# Patient Record
Sex: FEMALE | Race: WHITE | NOT HISPANIC OR LATINO | Employment: OTHER | URBAN - METROPOLITAN AREA
[De-identification: names, ages, dates, MRNs, and addresses within clinical notes are randomized per-mention and may not be internally consistent; named-entity substitution may affect disease eponyms.]

---

## 2018-01-26 ENCOUNTER — HOSPITAL ENCOUNTER (INPATIENT)
Facility: HOSPITAL | Age: 33
LOS: 5 days | Discharge: HOME | DRG: 638 | End: 2018-01-31
Attending: EMERGENCY MEDICINE | Admitting: FAMILY MEDICINE
Payer: MEDICARE

## 2018-01-26 DIAGNOSIS — B19.20 HEPATITIS C VIRUS INFECTION WITHOUT HEPATIC COMA, UNSPECIFIED CHRONICITY: ICD-10-CM

## 2018-01-26 DIAGNOSIS — E10.10 DIABETIC KETOACIDOSIS WITHOUT COMA ASSOCIATED WITH TYPE 1 DIABETES MELLITUS (HCC): Primary | ICD-10-CM

## 2018-01-26 DIAGNOSIS — J10.1 INFLUENZA A: ICD-10-CM

## 2018-01-26 PROBLEM — R73.9 HYPERGLYCEMIA: Status: ACTIVE | Noted: 2018-01-26

## 2018-01-26 PROBLEM — F11.10 HEROIN ABUSE (HCC): Status: ACTIVE | Noted: 2018-01-26

## 2018-01-26 LAB
ACETONE SERPL-MCNC: ABNORMAL MG/DL
ALBUMIN SERPL BCP-MCNC: 3.9 G/DL (ref 3.5–5)
ALP SERPL-CCNC: 124 U/L (ref 46–116)
ALT SERPL W P-5'-P-CCNC: 15 U/L (ref 12–78)
ANION GAP SERPL CALCULATED.3IONS-SCNC: 19 MMOL/L (ref 4–13)
ANION GAP SERPL CALCULATED.3IONS-SCNC: 9 MMOL/L (ref 4–13)
AST SERPL W P-5'-P-CCNC: 15 U/L (ref 5–45)
BASE EX.OXY STD BLDV CALC-SCNC: 90.8 % (ref 60–80)
BASE EXCESS BLDA CALC-SCNC: -4.6 MMOL/L
BASE EXCESS BLDV CALC-SCNC: -11.4 MMOL/L
BASOPHILS # BLD AUTO: 0.03 THOUSANDS/ΜL (ref 0–0.1)
BASOPHILS NFR BLD AUTO: 0 % (ref 0–1)
BILIRUB SERPL-MCNC: 0.71 MG/DL (ref 0.2–1)
BILIRUB UR QL STRIP: ABNORMAL
BUN SERPL-MCNC: 18 MG/DL (ref 5–25)
BUN SERPL-MCNC: 21 MG/DL (ref 5–25)
CALCIUM SERPL-MCNC: 8 MG/DL (ref 8.3–10.1)
CALCIUM SERPL-MCNC: 9.4 MG/DL (ref 8.3–10.1)
CHLORIDE SERPL-SCNC: 105 MMOL/L (ref 100–108)
CHLORIDE SERPL-SCNC: 94 MMOL/L (ref 100–108)
CLARITY UR: CLEAR
CO2 SERPL-SCNC: 20 MMOL/L (ref 21–32)
CO2 SERPL-SCNC: 21 MMOL/L (ref 21–32)
COLOR UR: YELLOW
CREAT SERPL-MCNC: 0.82 MG/DL (ref 0.6–1.3)
CREAT SERPL-MCNC: 0.93 MG/DL (ref 0.6–1.3)
EOSINOPHIL # BLD AUTO: 0.16 THOUSAND/ΜL (ref 0–0.61)
EOSINOPHIL NFR BLD AUTO: 1 % (ref 0–6)
ERYTHROCYTE [DISTWIDTH] IN BLOOD BY AUTOMATED COUNT: 15.5 % (ref 11.6–15.1)
EXT PREG TEST URINE: NORMAL
GFR SERPL CREATININE-BSD FRML MDRD: 80 ML/MIN/1.73SQ M
GFR SERPL CREATININE-BSD FRML MDRD: 94 ML/MIN/1.73SQ M
GLUCOSE SERPL-MCNC: 100 MG/DL (ref 65–140)
GLUCOSE SERPL-MCNC: 191 MG/DL (ref 65–140)
GLUCOSE SERPL-MCNC: 194 MG/DL (ref 65–140)
GLUCOSE SERPL-MCNC: 217 MG/DL (ref 65–140)
GLUCOSE SERPL-MCNC: 239 MG/DL (ref 65–140)
GLUCOSE SERPL-MCNC: 292 MG/DL (ref 65–140)
GLUCOSE SERPL-MCNC: 309 MG/DL (ref 65–140)
GLUCOSE SERPL-MCNC: 327 MG/DL (ref 65–140)
GLUCOSE SERPL-MCNC: 351 MG/DL (ref 65–140)
GLUCOSE SERPL-MCNC: 354 MG/DL (ref 65–140)
GLUCOSE SERPL-MCNC: 440 MG/DL (ref 65–140)
GLUCOSE SERPL-MCNC: 444 MG/DL (ref 65–140)
GLUCOSE UR STRIP-MCNC: ABNORMAL MG/DL
HCO3 BLDA-SCNC: 19.7 MMOL/L (ref 22–28)
HCO3 BLDV-SCNC: 14.3 MMOL/L (ref 24–30)
HCT VFR BLD AUTO: 37.9 % (ref 34.8–46.1)
HGB BLD-MCNC: 11.9 G/DL (ref 11.5–15.4)
HGB UR QL STRIP.AUTO: NEGATIVE
KETONES UR STRIP-MCNC: ABNORMAL MG/DL
LEUKOCYTE ESTERASE UR QL STRIP: NEGATIVE
LIPASE SERPL-CCNC: 101 U/L (ref 73–393)
LYMPHOCYTES # BLD AUTO: 2.4 THOUSANDS/ΜL (ref 0.6–4.47)
LYMPHOCYTES NFR BLD AUTO: 16 % (ref 14–44)
MCH RBC QN AUTO: 27.7 PG (ref 26.8–34.3)
MCHC RBC AUTO-ENTMCNC: 31.4 G/DL (ref 31.4–37.4)
MCV RBC AUTO: 88 FL (ref 82–98)
MONOCYTES # BLD AUTO: 0.6 THOUSAND/ΜL (ref 0.17–1.22)
MONOCYTES NFR BLD AUTO: 4 % (ref 4–12)
NEUTROPHILS # BLD AUTO: 11.49 THOUSANDS/ΜL (ref 1.85–7.62)
NEUTS SEG NFR BLD AUTO: 79 % (ref 43–75)
NITRITE UR QL STRIP: NEGATIVE
O2 CT BLDA-SCNC: 13.8 ML/DL (ref 16–23)
O2 CT BLDV-SCNC: 14.6 ML/DL
OXYHGB MFR BLDA: 96.5 % (ref 94–97)
PCO2 BLDA: 33.2 MM HG (ref 36–44)
PCO2 BLDV: 31.6 MM HG (ref 42–50)
PH BLDA: 7.39 [PH] (ref 7.35–7.45)
PH BLDV: 7.27 [PH] (ref 7.3–7.4)
PH UR STRIP.AUTO: 5 [PH] (ref 4.5–8)
PLATELET # BLD AUTO: 394 THOUSANDS/UL (ref 149–390)
PMV BLD AUTO: 10 FL (ref 8.9–12.7)
PO2 BLDA: 95.8 MM HG (ref 75–129)
PO2 BLDV: 69.9 MM HG (ref 35–45)
POTASSIUM SERPL-SCNC: 4.1 MMOL/L (ref 3.5–5.3)
POTASSIUM SERPL-SCNC: 4.4 MMOL/L (ref 3.5–5.3)
PROT SERPL-MCNC: 8.4 G/DL (ref 6.4–8.2)
PROT UR STRIP-MCNC: NEGATIVE MG/DL
RBC # BLD AUTO: 4.29 MILLION/UL (ref 3.81–5.12)
SODIUM SERPL-SCNC: 133 MMOL/L (ref 136–145)
SODIUM SERPL-SCNC: 135 MMOL/L (ref 136–145)
SP GR UR STRIP.AUTO: 1.02 (ref 1–1.03)
UROBILINOGEN UR QL STRIP.AUTO: 0.2 E.U./DL
WBC # BLD AUTO: 14.68 THOUSAND/UL (ref 4.31–10.16)

## 2018-01-26 PROCEDURE — 81003 URINALYSIS AUTO W/O SCOPE: CPT

## 2018-01-26 PROCEDURE — 96374 THER/PROPH/DIAG INJ IV PUSH: CPT

## 2018-01-26 PROCEDURE — 99285 EMERGENCY DEPT VISIT HI MDM: CPT

## 2018-01-26 PROCEDURE — 96361 HYDRATE IV INFUSION ADD-ON: CPT

## 2018-01-26 PROCEDURE — 82805 BLOOD GASES W/O2 SATURATION: CPT | Performed by: EMERGENCY MEDICINE

## 2018-01-26 PROCEDURE — 36415 COLL VENOUS BLD VENIPUNCTURE: CPT | Performed by: EMERGENCY MEDICINE

## 2018-01-26 PROCEDURE — 80048 BASIC METABOLIC PNL TOTAL CA: CPT | Performed by: PHYSICIAN ASSISTANT

## 2018-01-26 PROCEDURE — 99223 1ST HOSP IP/OBS HIGH 75: CPT | Performed by: PHYSICIAN ASSISTANT

## 2018-01-26 PROCEDURE — 81025 URINE PREGNANCY TEST: CPT | Performed by: EMERGENCY MEDICINE

## 2018-01-26 PROCEDURE — 82948 REAGENT STRIP/BLOOD GLUCOSE: CPT

## 2018-01-26 PROCEDURE — 83690 ASSAY OF LIPASE: CPT | Performed by: EMERGENCY MEDICINE

## 2018-01-26 PROCEDURE — 82805 BLOOD GASES W/O2 SATURATION: CPT | Performed by: PHYSICIAN ASSISTANT

## 2018-01-26 PROCEDURE — 85025 COMPLETE CBC W/AUTO DIFF WBC: CPT | Performed by: EMERGENCY MEDICINE

## 2018-01-26 PROCEDURE — 36600 WITHDRAWAL OF ARTERIAL BLOOD: CPT

## 2018-01-26 PROCEDURE — 81002 URINALYSIS NONAUTO W/O SCOPE: CPT | Performed by: EMERGENCY MEDICINE

## 2018-01-26 PROCEDURE — 80053 COMPREHEN METABOLIC PANEL: CPT | Performed by: EMERGENCY MEDICINE

## 2018-01-26 PROCEDURE — 82009 KETONE BODYS QUAL: CPT | Performed by: EMERGENCY MEDICINE

## 2018-01-26 RX ORDER — VENLAFAXINE 75 MG/1
37.5 TABLET ORAL 2 TIMES DAILY
Status: DISCONTINUED | OUTPATIENT
Start: 2018-01-26 | End: 2018-01-31 | Stop reason: HOSPADM

## 2018-01-26 RX ORDER — ONDANSETRON 2 MG/ML
4 INJECTION INTRAMUSCULAR; INTRAVENOUS EVERY 6 HOURS PRN
Status: DISCONTINUED | OUTPATIENT
Start: 2018-01-26 | End: 2018-01-31 | Stop reason: HOSPADM

## 2018-01-26 RX ORDER — GABAPENTIN 100 MG/1
100 CAPSULE ORAL 3 TIMES DAILY
COMMUNITY
End: 2018-02-27

## 2018-01-26 RX ORDER — INSULIN GLARGINE 100 [IU]/ML
15 INJECTION, SOLUTION SUBCUTANEOUS ONCE
Status: COMPLETED | OUTPATIENT
Start: 2018-01-26 | End: 2018-01-26

## 2018-01-26 RX ORDER — GABAPENTIN 100 MG/1
100 CAPSULE ORAL 3 TIMES DAILY
Status: DISCONTINUED | OUTPATIENT
Start: 2018-01-26 | End: 2018-01-31 | Stop reason: HOSPADM

## 2018-01-26 RX ORDER — AMLODIPINE BESYLATE 10 MG/1
10 TABLET ORAL DAILY
Status: DISCONTINUED | OUTPATIENT
Start: 2018-01-26 | End: 2018-01-31 | Stop reason: HOSPADM

## 2018-01-26 RX ORDER — SODIUM CHLORIDE 9 MG/ML
250 INJECTION, SOLUTION INTRAVENOUS CONTINUOUS
Status: DISCONTINUED | OUTPATIENT
Start: 2018-01-26 | End: 2018-01-26

## 2018-01-26 RX ORDER — SODIUM CHLORIDE 9 MG/ML
500 INJECTION, SOLUTION INTRAVENOUS CONTINUOUS
Status: DISCONTINUED | OUTPATIENT
Start: 2018-01-26 | End: 2018-01-26

## 2018-01-26 RX ORDER — SODIUM CHLORIDE 9 MG/ML
75 INJECTION, SOLUTION INTRAVENOUS CONTINUOUS
Status: DISCONTINUED | OUTPATIENT
Start: 2018-01-26 | End: 2018-01-30

## 2018-01-26 RX ORDER — AMLODIPINE BESYLATE 10 MG/1
10 TABLET ORAL DAILY
COMMUNITY
End: 2018-02-27

## 2018-01-26 RX ORDER — VENLAFAXINE 37.5 MG/1
37.5 TABLET ORAL 2 TIMES DAILY
COMMUNITY
End: 2018-02-27

## 2018-01-26 RX ORDER — DEXTROSE AND SODIUM CHLORIDE 5; .9 G/100ML; G/100ML
250 INJECTION, SOLUTION INTRAVENOUS CONTINUOUS
Status: DISCONTINUED | OUTPATIENT
Start: 2018-01-26 | End: 2018-01-26

## 2018-01-26 RX ORDER — PANTOPRAZOLE SODIUM 40 MG/1
40 TABLET, DELAYED RELEASE ORAL DAILY
COMMUNITY
End: 2018-02-27

## 2018-01-26 RX ORDER — PANTOPRAZOLE SODIUM 40 MG/1
40 TABLET, DELAYED RELEASE ORAL
Status: DISCONTINUED | OUTPATIENT
Start: 2018-01-26 | End: 2018-01-31 | Stop reason: HOSPADM

## 2018-01-26 RX ORDER — ACETAMINOPHEN 325 MG/1
650 TABLET ORAL EVERY 6 HOURS PRN
Status: DISCONTINUED | OUTPATIENT
Start: 2018-01-26 | End: 2018-01-31 | Stop reason: HOSPADM

## 2018-01-26 RX ORDER — SODIUM CHLORIDE 9 MG/ML
2000 INJECTION, SOLUTION INTRAVENOUS CONTINUOUS
Status: DISCONTINUED | OUTPATIENT
Start: 2018-01-26 | End: 2018-01-26

## 2018-01-26 RX ORDER — ONDANSETRON 2 MG/ML
4 INJECTION INTRAMUSCULAR; INTRAVENOUS ONCE
Status: COMPLETED | OUTPATIENT
Start: 2018-01-26 | End: 2018-01-26

## 2018-01-26 RX ADMIN — SODIUM CHLORIDE 500 ML/HR: 0.9 INJECTION, SOLUTION INTRAVENOUS at 12:58

## 2018-01-26 RX ADMIN — INSULIN GLARGINE 15 UNITS: 100 INJECTION, SOLUTION SUBCUTANEOUS at 18:10

## 2018-01-26 RX ADMIN — ACETAMINOPHEN 650 MG: 325 TABLET, FILM COATED ORAL at 21:19

## 2018-01-26 RX ADMIN — AMLODIPINE BESYLATE 10 MG: 10 TABLET ORAL at 14:36

## 2018-01-26 RX ADMIN — Medication 8 UNITS/HR: at 12:34

## 2018-01-26 RX ADMIN — SODIUM CHLORIDE 2000 ML/HR: 0.9 INJECTION, SOLUTION INTRAVENOUS at 10:26

## 2018-01-26 RX ADMIN — GABAPENTIN 100 MG: 100 CAPSULE ORAL at 21:20

## 2018-01-26 RX ADMIN — ONDANSETRON 4 MG: 2 INJECTION INTRAMUSCULAR; INTRAVENOUS at 08:31

## 2018-01-26 RX ADMIN — VENLAFAXINE 37.5 MG: 75 TABLET ORAL at 17:25

## 2018-01-26 RX ADMIN — ACETAMINOPHEN 650 MG: 325 TABLET, FILM COATED ORAL at 14:37

## 2018-01-26 RX ADMIN — SODIUM CHLORIDE 1000 ML: 0.9 INJECTION, SOLUTION INTRAVENOUS at 08:26

## 2018-01-26 RX ADMIN — ENOXAPARIN SODIUM 40 MG: 40 INJECTION SUBCUTANEOUS at 14:39

## 2018-01-26 RX ADMIN — SODIUM CHLORIDE 125 ML/HR: 0.9 INJECTION, SOLUTION INTRAVENOUS at 15:10

## 2018-01-26 RX ADMIN — PANTOPRAZOLE SODIUM 40 MG: 40 TABLET, DELAYED RELEASE ORAL at 14:37

## 2018-01-26 RX ADMIN — SODIUM CHLORIDE 7 UNITS/HR: 9 INJECTION, SOLUTION INTRAVENOUS at 10:46

## 2018-01-26 RX ADMIN — GABAPENTIN 100 MG: 100 CAPSULE ORAL at 15:12
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
## 2018-01-26 NOTE — ED NOTES
Patient moved from stretcher to impatient bed for comfort due to patient being an ED HOLD       Husam Wasserman RN  01/26/18 2038
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
## 2018-01-27 ENCOUNTER — APPOINTMENT (INPATIENT)
Dept: RADIOLOGY | Facility: HOSPITAL | Age: 33
DRG: 638 | End: 2018-01-27
Payer: MEDICARE

## 2018-01-27 PROBLEM — R65.10 SIRS (SYSTEMIC INFLAMMATORY RESPONSE SYNDROME) (HCC): Status: ACTIVE | Noted: 2018-01-27

## 2018-01-27 LAB
ALBUMIN SERPL BCP-MCNC: 3 G/DL (ref 3.5–5)
ALP SERPL-CCNC: 104 U/L (ref 46–116)
ALT SERPL W P-5'-P-CCNC: 14 U/L (ref 12–78)
ANION GAP SERPL CALCULATED.3IONS-SCNC: 12 MMOL/L (ref 4–13)
AST SERPL W P-5'-P-CCNC: 10 U/L (ref 5–45)
BILIRUB SERPL-MCNC: 0.41 MG/DL (ref 0.2–1)
BUN SERPL-MCNC: 10 MG/DL (ref 5–25)
CALCIUM SERPL-MCNC: 7.8 MG/DL (ref 8.3–10.1)
CHLORIDE SERPL-SCNC: 99 MMOL/L (ref 100–108)
CO2 SERPL-SCNC: 23 MMOL/L (ref 21–32)
CREAT SERPL-MCNC: 0.69 MG/DL (ref 0.6–1.3)
ERYTHROCYTE [DISTWIDTH] IN BLOOD BY AUTOMATED COUNT: 15.8 % (ref 11.6–15.1)
GFR SERPL CREATININE-BSD FRML MDRD: 114 ML/MIN/1.73SQ M
GLUCOSE SERPL-MCNC: 244 MG/DL (ref 65–140)
GLUCOSE SERPL-MCNC: 253 MG/DL (ref 65–140)
GLUCOSE SERPL-MCNC: 272 MG/DL (ref 65–140)
GLUCOSE SERPL-MCNC: 319 MG/DL (ref 65–140)
GLUCOSE SERPL-MCNC: 388 MG/DL (ref 65–140)
GLUCOSE SERPL-MCNC: 405 MG/DL (ref 65–140)
HCT VFR BLD AUTO: 33.4 % (ref 34.8–46.1)
HGB BLD-MCNC: 10.5 G/DL (ref 11.5–15.4)
MAGNESIUM SERPL-MCNC: 1.8 MG/DL (ref 1.6–2.6)
MCH RBC QN AUTO: 27.6 PG (ref 26.8–34.3)
MCHC RBC AUTO-ENTMCNC: 31.4 G/DL (ref 31.4–37.4)
MCV RBC AUTO: 88 FL (ref 82–98)
PHOSPHATE SERPL-MCNC: 2.3 MG/DL (ref 2.7–4.5)
PLATELET # BLD AUTO: 315 THOUSANDS/UL (ref 149–390)
PMV BLD AUTO: 10.1 FL (ref 8.9–12.7)
POTASSIUM SERPL-SCNC: 3.9 MMOL/L (ref 3.5–5.3)
PROT SERPL-MCNC: 6.8 G/DL (ref 6.4–8.2)
RBC # BLD AUTO: 3.81 MILLION/UL (ref 3.81–5.12)
SODIUM SERPL-SCNC: 134 MMOL/L (ref 136–145)
WBC # BLD AUTO: 11.08 THOUSAND/UL (ref 4.31–10.16)

## 2018-01-27 PROCEDURE — 82948 REAGENT STRIP/BLOOD GLUCOSE: CPT

## 2018-01-27 PROCEDURE — 87040 BLOOD CULTURE FOR BACTERIA: CPT | Performed by: FAMILY MEDICINE

## 2018-01-27 PROCEDURE — 71045 X-RAY EXAM CHEST 1 VIEW: CPT

## 2018-01-27 PROCEDURE — 87798 DETECT AGENT NOS DNA AMP: CPT | Performed by: FAMILY MEDICINE

## 2018-01-27 PROCEDURE — 80053 COMPREHEN METABOLIC PANEL: CPT | Performed by: PHYSICIAN ASSISTANT

## 2018-01-27 PROCEDURE — 85027 COMPLETE CBC AUTOMATED: CPT | Performed by: PHYSICIAN ASSISTANT

## 2018-01-27 PROCEDURE — 83735 ASSAY OF MAGNESIUM: CPT | Performed by: PHYSICIAN ASSISTANT

## 2018-01-27 PROCEDURE — 99233 SBSQ HOSP IP/OBS HIGH 50: CPT | Performed by: FAMILY MEDICINE

## 2018-01-27 PROCEDURE — 84100 ASSAY OF PHOSPHORUS: CPT | Performed by: PHYSICIAN ASSISTANT

## 2018-01-27 RX ORDER — INSULIN GLARGINE 100 [IU]/ML
30 INJECTION, SOLUTION SUBCUTANEOUS EVERY MORNING
Status: DISCONTINUED | OUTPATIENT
Start: 2018-01-28 | End: 2018-01-28

## 2018-01-27 RX ORDER — INSULIN GLARGINE 100 [IU]/ML
40 INJECTION, SOLUTION SUBCUTANEOUS
Status: DISCONTINUED | OUTPATIENT
Start: 2018-01-27 | End: 2018-01-27

## 2018-01-27 RX ORDER — INSULIN GLARGINE 100 [IU]/ML
15 INJECTION, SOLUTION SUBCUTANEOUS ONCE
Status: COMPLETED | OUTPATIENT
Start: 2018-01-27 | End: 2018-01-27

## 2018-01-27 RX ORDER — INSULIN GLARGINE 100 [IU]/ML
30 INJECTION, SOLUTION SUBCUTANEOUS
Status: DISCONTINUED | OUTPATIENT
Start: 2018-01-27 | End: 2018-01-27

## 2018-01-27 RX ADMIN — INSULIN LISPRO 3 UNITS: 100 INJECTION, SOLUTION INTRAVENOUS; SUBCUTANEOUS at 11:57

## 2018-01-27 RX ADMIN — GABAPENTIN 100 MG: 100 CAPSULE ORAL at 18:06

## 2018-01-27 RX ADMIN — VENLAFAXINE 37.5 MG: 75 TABLET ORAL at 08:37

## 2018-01-27 RX ADMIN — INSULIN LISPRO 4 UNITS: 100 INJECTION, SOLUTION INTRAVENOUS; SUBCUTANEOUS at 00:46

## 2018-01-27 RX ADMIN — INSULIN LISPRO 4 UNITS: 100 INJECTION, SOLUTION INTRAVENOUS; SUBCUTANEOUS at 11:57

## 2018-01-27 RX ADMIN — INSULIN LISPRO 4 UNITS: 100 INJECTION, SOLUTION INTRAVENOUS; SUBCUTANEOUS at 08:37

## 2018-01-27 RX ADMIN — INSULIN LISPRO 4 UNITS: 100 INJECTION, SOLUTION INTRAVENOUS; SUBCUTANEOUS at 18:07

## 2018-01-27 RX ADMIN — ACETAMINOPHEN 650 MG: 325 TABLET, FILM COATED ORAL at 05:11

## 2018-01-27 RX ADMIN — VENLAFAXINE 37.5 MG: 75 TABLET ORAL at 18:00

## 2018-01-27 RX ADMIN — ACETAMINOPHEN 650 MG: 325 TABLET, FILM COATED ORAL at 15:38

## 2018-01-27 RX ADMIN — GABAPENTIN 100 MG: 100 CAPSULE ORAL at 08:37

## 2018-01-27 RX ADMIN — SODIUM CHLORIDE 75 ML/HR: 0.9 INJECTION, SOLUTION INTRAVENOUS at 22:30

## 2018-01-27 RX ADMIN — ONDANSETRON 4 MG: 2 INJECTION INTRAMUSCULAR; INTRAVENOUS at 20:51

## 2018-01-27 RX ADMIN — ACETAMINOPHEN 650 MG: 325 TABLET, FILM COATED ORAL at 20:51

## 2018-01-27 RX ADMIN — INSULIN LISPRO 2 UNITS: 100 INJECTION, SOLUTION INTRAVENOUS; SUBCUTANEOUS at 08:37

## 2018-01-27 RX ADMIN — ACETAMINOPHEN 650 MG: 325 TABLET, FILM COATED ORAL at 11:15

## 2018-01-27 RX ADMIN — ENOXAPARIN SODIUM 40 MG: 40 INJECTION SUBCUTANEOUS at 08:37

## 2018-01-27 RX ADMIN — PANTOPRAZOLE SODIUM 40 MG: 40 TABLET, DELAYED RELEASE ORAL at 05:11

## 2018-01-27 RX ADMIN — GABAPENTIN 100 MG: 100 CAPSULE ORAL at 20:51

## 2018-01-27 RX ADMIN — AMLODIPINE BESYLATE 10 MG: 10 TABLET ORAL at 08:37

## 2018-01-27 RX ADMIN — INSULIN GLARGINE 15 UNITS: 100 INJECTION, SOLUTION SUBCUTANEOUS at 18:07

## 2018-01-27 RX ADMIN — INSULIN LISPRO 2 UNITS: 100 INJECTION, SOLUTION INTRAVENOUS; SUBCUTANEOUS at 22:08

## 2018-01-27 NOTE — PROGRESS NOTES
Patients temperature 101 6  MD aware, Flu precautions placed, orders reviewed and acknowledged  Will give PRN tylenol per MD, and CTM  While putting patient on Flu R/O precautions, wil walked in room  Advised family to place mask on, per him "I never get sick, I don't need that " patient and family aware of R/O flu   Will CTM

## 2018-01-27 NOTE — ASSESSMENT & PLAN NOTE
· Fever, leukocytosis  · No obvious sign of infection  · Obtain infectious workup including flu panel, urine culture blood culture, chest x-ray, CBC in the morning  · Droplet precautions  · Obtain stool studies if develops diarrhea
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
## 2018-01-27 NOTE — ASSESSMENT & PLAN NOTE
· History of hypertension not on medications  · Blood pressure is normal  · Continue to monitor vital signs

## 2018-01-27 NOTE — ASSESSMENT & PLAN NOTE
· Was mild and resolved  · Started diet  · Restarted on Lantus 30 units in the morning (pt on 40 u QAM at home +ISS)  · Added Humalog 5 units t i d  before meals  · Continue with Accu-Cheks and insulin sliding scale

## 2018-01-27 NOTE — PLAN OF CARE
DISCHARGE PLANNING     Discharge to home or other facility with appropriate resources Progressing        INFECTION - ADULT     Absence or prevention of progression during hospitalization Progressing        Knowledge Deficit     Patient/family/caregiver demonstrates understanding of disease process, treatment plan, medications, and discharge instructions Progressing        METABOLIC, FLUID AND ELECTROLYTES - ADULT     Glucose maintained within target range Progressing        PAIN - ADULT     Verbalizes/displays adequate comfort level or baseline comfort level Progressing        Potential for Falls     Patient will remain free of falls Progressing        SAFETY ADULT     Patient will remain free of falls Progressing

## 2018-01-28 PROBLEM — J10.1 INFLUENZA A: Status: ACTIVE | Noted: 2018-01-28

## 2018-01-28 LAB
ANION GAP SERPL CALCULATED.3IONS-SCNC: 8 MMOL/L (ref 4–13)
BASOPHILS # BLD AUTO: 0.04 THOUSANDS/ΜL (ref 0–0.1)
BASOPHILS NFR BLD AUTO: 0 % (ref 0–1)
BUN SERPL-MCNC: 8 MG/DL (ref 5–25)
CALCIUM SERPL-MCNC: 8.2 MG/DL (ref 8.3–10.1)
CHLORIDE SERPL-SCNC: 102 MMOL/L (ref 100–108)
CO2 SERPL-SCNC: 25 MMOL/L (ref 21–32)
CREAT SERPL-MCNC: 0.59 MG/DL (ref 0.6–1.3)
EOSINOPHIL # BLD AUTO: 0.04 THOUSAND/ΜL (ref 0–0.61)
EOSINOPHIL NFR BLD AUTO: 0 % (ref 0–6)
ERYTHROCYTE [DISTWIDTH] IN BLOOD BY AUTOMATED COUNT: 15.6 % (ref 11.6–15.1)
FLUAV AG SPEC QL: DETECTED
FLUBV AG SPEC QL: ABNORMAL
GFR SERPL CREATININE-BSD FRML MDRD: 120 ML/MIN/1.73SQ M
GLUCOSE SERPL-MCNC: 158 MG/DL (ref 65–140)
GLUCOSE SERPL-MCNC: 227 MG/DL (ref 65–140)
GLUCOSE SERPL-MCNC: 235 MG/DL (ref 65–140)
GLUCOSE SERPL-MCNC: 243 MG/DL (ref 65–140)
GLUCOSE SERPL-MCNC: 250 MG/DL (ref 65–140)
HCT VFR BLD AUTO: 31.5 % (ref 34.8–46.1)
HGB BLD-MCNC: 10 G/DL (ref 11.5–15.4)
LYMPHOCYTES # BLD AUTO: 1.33 THOUSANDS/ΜL (ref 0.6–4.47)
LYMPHOCYTES NFR BLD AUTO: 12 % (ref 14–44)
MCH RBC QN AUTO: 27.9 PG (ref 26.8–34.3)
MCHC RBC AUTO-ENTMCNC: 31.7 G/DL (ref 31.4–37.4)
MCV RBC AUTO: 88 FL (ref 82–98)
MONOCYTES # BLD AUTO: 0.94 THOUSAND/ΜL (ref 0.17–1.22)
MONOCYTES NFR BLD AUTO: 9 % (ref 4–12)
NEUTROPHILS # BLD AUTO: 8.56 THOUSANDS/ΜL (ref 1.85–7.62)
NEUTS SEG NFR BLD AUTO: 79 % (ref 43–75)
NRBC BLD AUTO-RTO: 0 /100 WBCS
PLATELET # BLD AUTO: 273 THOUSANDS/UL (ref 149–390)
PMV BLD AUTO: 10.2 FL (ref 8.9–12.7)
POTASSIUM SERPL-SCNC: 3.7 MMOL/L (ref 3.5–5.3)
RBC # BLD AUTO: 3.58 MILLION/UL (ref 3.81–5.12)
RSV B RNA SPEC QL NAA+PROBE: ABNORMAL
SODIUM SERPL-SCNC: 135 MMOL/L (ref 136–145)
WBC # BLD AUTO: 10.91 THOUSAND/UL (ref 4.31–10.16)

## 2018-01-28 PROCEDURE — 85025 COMPLETE CBC W/AUTO DIFF WBC: CPT | Performed by: FAMILY MEDICINE

## 2018-01-28 PROCEDURE — 80048 BASIC METABOLIC PNL TOTAL CA: CPT | Performed by: FAMILY MEDICINE

## 2018-01-28 PROCEDURE — 99232 SBSQ HOSP IP/OBS MODERATE 35: CPT | Performed by: FAMILY MEDICINE

## 2018-01-28 PROCEDURE — 82948 REAGENT STRIP/BLOOD GLUCOSE: CPT

## 2018-01-28 RX ORDER — INSULIN GLARGINE 100 [IU]/ML
40 INJECTION, SOLUTION SUBCUTANEOUS EVERY MORNING
Status: DISCONTINUED | OUTPATIENT
Start: 2018-01-29 | End: 2018-01-28

## 2018-01-28 RX ORDER — OSELTAMIVIR PHOSPHATE 75 MG/1
75 CAPSULE ORAL EVERY 12 HOURS SCHEDULED
Status: DISCONTINUED | OUTPATIENT
Start: 2018-01-28 | End: 2018-01-31 | Stop reason: HOSPADM

## 2018-01-28 RX ORDER — INSULIN GLARGINE 100 [IU]/ML
45 INJECTION, SOLUTION SUBCUTANEOUS EVERY MORNING
Status: DISCONTINUED | OUTPATIENT
Start: 2018-01-29 | End: 2018-01-29

## 2018-01-28 RX ORDER — INSULIN GLARGINE 100 [IU]/ML
10 INJECTION, SOLUTION SUBCUTANEOUS ONCE
Status: COMPLETED | OUTPATIENT
Start: 2018-01-28 | End: 2018-01-28

## 2018-01-28 RX ADMIN — GABAPENTIN 100 MG: 100 CAPSULE ORAL at 09:31

## 2018-01-28 RX ADMIN — ACETAMINOPHEN 650 MG: 325 TABLET, FILM COATED ORAL at 09:30

## 2018-01-28 RX ADMIN — VENLAFAXINE 37.5 MG: 75 TABLET ORAL at 09:30

## 2018-01-28 RX ADMIN — INSULIN LISPRO 2 UNITS: 100 INJECTION, SOLUTION INTRAVENOUS; SUBCUTANEOUS at 09:29

## 2018-01-28 RX ADMIN — ONDANSETRON 4 MG: 2 INJECTION INTRAMUSCULAR; INTRAVENOUS at 02:39

## 2018-01-28 RX ADMIN — PANTOPRAZOLE SODIUM 40 MG: 40 TABLET, DELAYED RELEASE ORAL at 05:21

## 2018-01-28 RX ADMIN — ACETAMINOPHEN 650 MG: 325 TABLET, FILM COATED ORAL at 15:59

## 2018-01-28 RX ADMIN — OSELTAMIVIR PHOSPHATE 75 MG: 75 CAPSULE ORAL at 22:45

## 2018-01-28 RX ADMIN — VENLAFAXINE 37.5 MG: 75 TABLET ORAL at 16:00

## 2018-01-28 RX ADMIN — ACETAMINOPHEN 650 MG: 325 TABLET, FILM COATED ORAL at 22:45

## 2018-01-28 RX ADMIN — INSULIN LISPRO 1 UNITS: 100 INJECTION, SOLUTION INTRAVENOUS; SUBCUTANEOUS at 22:46

## 2018-01-28 RX ADMIN — ONDANSETRON 4 MG: 2 INJECTION INTRAMUSCULAR; INTRAVENOUS at 22:46

## 2018-01-28 RX ADMIN — GABAPENTIN 100 MG: 100 CAPSULE ORAL at 15:59

## 2018-01-28 RX ADMIN — ONDANSETRON 4 MG: 2 INJECTION INTRAMUSCULAR; INTRAVENOUS at 09:30

## 2018-01-28 RX ADMIN — INSULIN LISPRO 2 UNITS: 100 INJECTION, SOLUTION INTRAVENOUS; SUBCUTANEOUS at 13:52

## 2018-01-28 RX ADMIN — AMLODIPINE BESYLATE 10 MG: 10 TABLET ORAL at 09:31

## 2018-01-28 RX ADMIN — INSULIN LISPRO 2 UNITS: 100 INJECTION, SOLUTION INTRAVENOUS; SUBCUTANEOUS at 17:50

## 2018-01-28 RX ADMIN — ACETAMINOPHEN 650 MG: 325 TABLET, FILM COATED ORAL at 02:39

## 2018-01-28 RX ADMIN — GABAPENTIN 100 MG: 100 CAPSULE ORAL at 22:46

## 2018-01-28 RX ADMIN — ENOXAPARIN SODIUM 40 MG: 40 INJECTION SUBCUTANEOUS at 09:30

## 2018-01-28 RX ADMIN — INSULIN GLARGINE 10 UNITS: 100 INJECTION, SOLUTION SUBCUTANEOUS at 10:17

## 2018-01-29 LAB
ANION GAP SERPL CALCULATED.3IONS-SCNC: 9 MMOL/L (ref 4–13)
BASOPHILS # BLD AUTO: 0.02 THOUSANDS/ΜL (ref 0–0.1)
BASOPHILS NFR BLD AUTO: 0 % (ref 0–1)
BUN SERPL-MCNC: 8 MG/DL (ref 5–25)
CALCIUM SERPL-MCNC: 8.7 MG/DL (ref 8.3–10.1)
CHLORIDE SERPL-SCNC: 99 MMOL/L (ref 100–108)
CO2 SERPL-SCNC: 23 MMOL/L (ref 21–32)
CREAT SERPL-MCNC: 0.64 MG/DL (ref 0.6–1.3)
EOSINOPHIL # BLD AUTO: 0.04 THOUSAND/ΜL (ref 0–0.61)
EOSINOPHIL NFR BLD AUTO: 0 % (ref 0–6)
ERYTHROCYTE [DISTWIDTH] IN BLOOD BY AUTOMATED COUNT: 16.1 % (ref 11.6–15.1)
GFR SERPL CREATININE-BSD FRML MDRD: 117 ML/MIN/1.73SQ M
GLUCOSE SERPL-MCNC: 204 MG/DL (ref 65–140)
GLUCOSE SERPL-MCNC: 272 MG/DL (ref 65–140)
GLUCOSE SERPL-MCNC: 294 MG/DL (ref 65–140)
GLUCOSE SERPL-MCNC: 321 MG/DL (ref 65–140)
GLUCOSE SERPL-MCNC: 342 MG/DL (ref 65–140)
HCT VFR BLD AUTO: 32.3 % (ref 34.8–46.1)
HGB BLD-MCNC: 10.1 G/DL (ref 11.5–15.4)
LYMPHOCYTES # BLD AUTO: 2.06 THOUSANDS/ΜL (ref 0.6–4.47)
LYMPHOCYTES NFR BLD AUTO: 19 % (ref 14–44)
MCH RBC QN AUTO: 27.7 PG (ref 26.8–34.3)
MCHC RBC AUTO-ENTMCNC: 31.3 G/DL (ref 31.4–37.4)
MCV RBC AUTO: 89 FL (ref 82–98)
MONOCYTES # BLD AUTO: 0.91 THOUSAND/ΜL (ref 0.17–1.22)
MONOCYTES NFR BLD AUTO: 8 % (ref 4–12)
NEUTROPHILS # BLD AUTO: 7.91 THOUSANDS/ΜL (ref 1.85–7.62)
NEUTS SEG NFR BLD AUTO: 73 % (ref 43–75)
NRBC BLD AUTO-RTO: 0 /100 WBCS
PLATELET # BLD AUTO: 261 THOUSANDS/UL (ref 149–390)
PMV BLD AUTO: 10.3 FL (ref 8.9–12.7)
POTASSIUM SERPL-SCNC: 4.4 MMOL/L (ref 3.5–5.3)
RBC # BLD AUTO: 3.65 MILLION/UL (ref 3.81–5.12)
SODIUM SERPL-SCNC: 131 MMOL/L (ref 136–145)
WBC # BLD AUTO: 10.94 THOUSAND/UL (ref 4.31–10.16)

## 2018-01-29 PROCEDURE — 82948 REAGENT STRIP/BLOOD GLUCOSE: CPT

## 2018-01-29 PROCEDURE — 99232 SBSQ HOSP IP/OBS MODERATE 35: CPT | Performed by: INTERNAL MEDICINE

## 2018-01-29 PROCEDURE — 80048 BASIC METABOLIC PNL TOTAL CA: CPT | Performed by: FAMILY MEDICINE

## 2018-01-29 PROCEDURE — 85025 COMPLETE CBC W/AUTO DIFF WBC: CPT | Performed by: FAMILY MEDICINE

## 2018-01-29 RX ORDER — INSULIN GLARGINE 100 [IU]/ML
50 INJECTION, SOLUTION SUBCUTANEOUS EVERY MORNING
Status: DISCONTINUED | OUTPATIENT
Start: 2018-01-30 | End: 2018-01-31 | Stop reason: HOSPADM

## 2018-01-29 RX ADMIN — ACETAMINOPHEN 650 MG: 325 TABLET, FILM COATED ORAL at 21:37

## 2018-01-29 RX ADMIN — ONDANSETRON 4 MG: 2 INJECTION INTRAMUSCULAR; INTRAVENOUS at 04:53

## 2018-01-29 RX ADMIN — SODIUM CHLORIDE 75 ML/HR: 0.9 INJECTION, SOLUTION INTRAVENOUS at 16:31

## 2018-01-29 RX ADMIN — INSULIN LISPRO 3 UNITS: 100 INJECTION, SOLUTION INTRAVENOUS; SUBCUTANEOUS at 09:20

## 2018-01-29 RX ADMIN — INSULIN LISPRO 1 UNITS: 100 INJECTION, SOLUTION INTRAVENOUS; SUBCUTANEOUS at 21:37

## 2018-01-29 RX ADMIN — GABAPENTIN 100 MG: 100 CAPSULE ORAL at 15:51

## 2018-01-29 RX ADMIN — ACETAMINOPHEN 650 MG: 325 TABLET, FILM COATED ORAL at 04:53

## 2018-01-29 RX ADMIN — INSULIN GLARGINE 45 UNITS: 100 INJECTION, SOLUTION SUBCUTANEOUS at 09:32

## 2018-01-29 RX ADMIN — ACETAMINOPHEN 650 MG: 325 TABLET, FILM COATED ORAL at 11:52

## 2018-01-29 RX ADMIN — VENLAFAXINE 37.5 MG: 75 TABLET ORAL at 18:03

## 2018-01-29 RX ADMIN — ENOXAPARIN SODIUM 40 MG: 40 INJECTION SUBCUTANEOUS at 09:18

## 2018-01-29 RX ADMIN — OSELTAMIVIR PHOSPHATE 75 MG: 75 CAPSULE ORAL at 21:37

## 2018-01-29 RX ADMIN — OSELTAMIVIR PHOSPHATE 75 MG: 75 CAPSULE ORAL at 09:19

## 2018-01-29 RX ADMIN — INSULIN LISPRO 3 UNITS: 100 INJECTION, SOLUTION INTRAVENOUS; SUBCUTANEOUS at 12:28

## 2018-01-29 RX ADMIN — GABAPENTIN 100 MG: 100 CAPSULE ORAL at 09:18

## 2018-01-29 RX ADMIN — PANTOPRAZOLE SODIUM 40 MG: 40 TABLET, DELAYED RELEASE ORAL at 05:19

## 2018-01-29 RX ADMIN — GABAPENTIN 100 MG: 100 CAPSULE ORAL at 21:37

## 2018-01-29 RX ADMIN — ONDANSETRON 4 MG: 2 INJECTION INTRAMUSCULAR; INTRAVENOUS at 11:52

## 2018-01-29 RX ADMIN — VENLAFAXINE 37.5 MG: 75 TABLET ORAL at 09:18

## 2018-01-29 RX ADMIN — INSULIN LISPRO 4 UNITS: 100 INJECTION, SOLUTION INTRAVENOUS; SUBCUTANEOUS at 16:30

## 2018-01-29 NOTE — PROGRESS NOTES
Progress Note - Chago Bree 1985, 27 y o  female MRN: [id number]    Unit/Bed#: E5 -01 Encounter: [id number]    Primary Care Provider: No primary care provider on file  Date and time admitted to hospital: 1/26/2018  7:58 AM        * Diabetic ketoacidosis associated with type 1 diabetes mellitus ([facility] [address] )   Assessment & Plan    · Secondary to not using insulin for 3 days  · Was mild and resolved but glucose still elevated  · Increased Lantus to 45 u daily QAM and Humalog 6 u TID   · Continue with Accu-Cheks and insulin sliding scale        Influenza A   Assessment & Plan    · Symptoms onset < 48 hours  · Started on Tamiflu  · Continue supportive treatment        SIRS (systemic inflammatory response syndrome) (HCC)   Assessment & Plan    · Secondary to influenza A infection  · Fever, leukocytosis  · Droplet precautions  · Started on Tamiflu  · Await blood cx results        Heroin abuse   Assessment & Plan    · Counseled on cessation  · Watch for signs and symptoms withdrawal  · Case Management consult        GERD (gastroesophageal reflux disease)   Assessment & Plan    · Asymptomatic, continue Protonix        Hepatitis C   Assessment & Plan    · With history of IV drug use  · Normal LFT  · Outpatient follow-up        Hypertension   Assessment & Plan    · History of hypertension not on medications  · Blood pressure is normal  · Continue to monitor vital signs          VTE Pharmacologic Prophylaxis:   Pharmacologic: Enoxaparin (Lovenox)  Mechanical VTE Prophylaxis in Place: Yes    Discussions with Specialists or Other Care Team Provider: Nursing    Education and Discussions with Family / Patient: Patient's fiancee    Time Spent for Care: 30 minutes  More than 50% of total time spent on counseling and coordination of care as described above      Current Length of Stay: 2 day(s)    Current Patient Status: Inpatient   Certification Statement: The patient will continue to require additional inpatient hospital stay due to need for close monitoring, treatment adjustment, infectious precautions    Discharge Plan: TBD    Code Status: Level 1 - Full Code      Subjective:   Patient seen and examined  She states she has been feeling chills and fatigue  She is c/o dry cough  Denies body aches  Denies GI or  symptoms  Good appetite  Objective:     Vitals:   Temp (24hrs), Av 8 °F (37 1 °C), Min:97 6 °F (36 4 °C), Max:100 3 °F (37 9 °C)    HR:  [100-109] 100  Resp:  [19-20] 20  BP: ()/(53-65) 98/53  SpO2:  [95 %-98 %] 95 %  There is no height or weight on file to calculate BMI  Input and Output Summary (last 24 hours): Intake/Output Summary (Last 24 hours) at 18  Last data filed at 18 1752   Gross per 24 hour   Intake           2352 5 ml   Output                0 ml   Net           2352 5 ml       Physical Exam:     Physical Exam   Constitutional: She is oriented to person, place, and time  No distress  HENT:   Head: Normocephalic and atraumatic  adentulous     Neck: Normal range of motion  No JVD present  Cardiovascular: Normal rate and regular rhythm  Exam reveals no gallop  No murmur heard  Pulmonary/Chest: No respiratory distress  She has no wheezes  She has no rales  Abdominal: Soft  She exhibits no distension  There is no tenderness  There is no guarding  Musculoskeletal: She exhibits no edema  Neurological: She is alert and oriented to person, place, and time  Skin:   Venous trach marks antecubital fossa   Psychiatric: She has a normal mood and affect         Additional Data:     Labs:      Results from last 7 days  Lab Units 18  0522   WBC Thousand/uL 10 91*   HEMOGLOBIN g/dL 10 0*   HEMATOCRIT % 31 5*   PLATELETS Thousands/uL 273   NEUTROS PCT % 79*   LYMPHS PCT % 12*   MONOS PCT % 9   EOS PCT % 0       Results from last 7 days  Lab Units 18  0522 18  0509   SODIUM mmol/L 135* 134*   POTASSIUM mmol/L 3 7 3 9   CHLORIDE mmol/L 102 99*   CO2 mmol/L 25 23   BUN mg/dL 8 10   CREATININE mg/dL 0 59* 0 69   CALCIUM mg/dL 8 2* 7 8*   TOTAL PROTEIN g/dL  --  6 8   BILIRUBIN TOTAL mg/dL  --  0 41   ALK PHOS U/L  --  104   ALT U/L  --  14   AST U/L  --  10   GLUCOSE RANDOM mg/dL 227* 319*           * I Have Reviewed All Lab Data Listed Above  * Additional Pertinent Lab Tests Reviewed: [address] Admission Reviewed    Imaging:    Imaging Reports Reviewed Today Include: TBD  Imaging Personally Reviewed by Myself Includes:  None    Recent Cultures (last 7 days):       Results from last 7 days  Lab Units 01/27/18  1549   INFLUENZA A PCR  Detected*   INFLUENZA B PCR  None Detected   RSV PCR  None Detected       Last 24 Hours Medication List:     amLODIPine 10 mg Oral Daily   enoxaparin 40 mg Subcutaneous Daily   gabapentin 100 mg Oral TID   [START ON 1/29/2018] insulin glargine 45 Units Subcutaneous QAM   insulin lispro 1-5 Units Subcutaneous TID AC   insulin lispro 1-5 Units Subcutaneous HS   [START ON 1/29/2018] insulin lispro 6 Units Subcutaneous TID With Meals   oseltamivir 75 mg Oral Q12H ROCÍO   pantoprazole 40 mg Oral Early Morning   venlafaxine 37 5 mg Oral BID        Today, Patient Was Seen By: Kulwinder Torres MD    ** Please Note: Dictation voice to text software may have been used in the creation of this document   **
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
## 2018-01-29 NOTE — ASSESSMENT & PLAN NOTE
· Secondary to not using insulin for 3 days  · Was mild and resolved but glucose still elevated  · Increased Lantus to 45 u daily QAM and Humalog 6 u TID   · Continue with Accu-Cheks and insulin sliding scale

## 2018-01-29 NOTE — PROGRESS NOTES
[facility] Progress Note  Patient: Francois Chakraborty 27 y o  female   MRN: [id number]  PCP: No primary care provider on file  Unit/Bed#: E5 -01 Encounter: [id number]  Date Of Visit: 18    Assessment:    Principal Problem:    Diabetic ketoacidosis associated with type 1 diabetes mellitus ([address] )  Active Problems:    Hypertension    Hepatitis C    GERD (gastroesophageal reflux disease)    Hyperglycemia    Heroin abuse    SIRS (systemic inflammatory response syndrome) (HCC)    Influenza A      Plan:    · DKA resolved needs further adjustments in insulin maintenance as continues to have pseudohyponatremia from hyperglycemia with blood sugars in 240-300 and Humalog with meals to 8 units  · Hepatitis C  · Heroin abuse by history  · Influenza A, started on Tamiflu on the       VTE Pharmacologic Prophylaxis:   Pharmacologic: Enoxaparin (Lovenox)  Mechanical VTE Prophylaxis in Place: Yes    Discussions with Specialists or Other Care Team Provider:  Now    Time Spent for Care: 30 minutes  More than 50% of total time spent on counseling and coordination of care as described above  Subjective:   Ambulatory no acute distress less febrile temp 100° point at [facility] yesterday    Objective:     Vitals:   Temp (24hrs), Av 7 °F (37 1 °C), Min:98 °F (36 7 °C), Max:99 2 °F (37 3 °C)    HR:  [] 95  Resp:  [18-19] 19  BP: (100-121)/(58-71) 121/71  SpO2:  [95 %-99 %] 99 %  There is no height or weight on file to calculate BMI  Input and Output Summary (last 24 hours):        Intake/Output Summary (Last 24 hours) at 18 1541  Last data filed at 18 1752   Gross per 24 hour   Intake           1452 5 ml   Output                0 ml   Net           1452 5 ml       Physical Exam:     Physical Exam:   General appearance: alert, appears stated age and cooperative  Head: Normocephalic, without obvious abnormality, atraumatic  Lungs: clear to auscultation bilaterally  Heart: regular rate and rhythm  Abdomen: soft, non-tender; bowel sounds normal; no masses,  no organomegaly  Back: negative  Extremities: extremities normal, atraumatic, no cyanosis or edema  Neurologic: Grossly normal      Additional Data:     Labs:      Results from last 7 days  Lab Units 01/29/18  1242   WBC Thousand/uL 10 94*   HEMOGLOBIN g/dL 10 1*   HEMATOCRIT % 32 3*   PLATELETS Thousands/uL 261   NEUTROS PCT % 73   LYMPHS PCT % 19   MONOS PCT % 8   EOS PCT % 0       Results from last 7 days  Lab Units 01/29/18  1241  01/27/18  0509   SODIUM mmol/L 131*  < > 134*   POTASSIUM mmol/L 4 4  < > 3 9   CHLORIDE mmol/L 99*  < > 99*   CO2 mmol/L 23  < > 23   BUN mg/dL 8  < > 10   CREATININE mg/dL 0 64  < > 0 69   CALCIUM mg/dL 8 7  < > 7 8*   TOTAL PROTEIN g/dL  --   --  6 8   BILIRUBIN TOTAL mg/dL  --   --  0 41   ALK PHOS U/L  --   --  104   ALT U/L  --   --  14   AST U/L  --   --  10   GLUCOSE RANDOM mg/dL 272*  < > 319*   < > = values in this interval not displayed  * I Have Reviewed All Lab Data Listed Above  * Additional Pertinent Lab Tests Reviewed: [address] [facility] Admission Reviewed    Imaging:  Xr Chest Portable    Result Date: 1/28/2018  Narrative: CHEST INDICATION:  Fever COMPARISON:  None VIEWS:   AP frontal IMAGES:  1 FINDINGS:     Cardiomediastinal silhouette appears unremarkable  The lungs are clear  No pneumothorax or pleural effusion  Visualized osseous structures appear within normal limits for the patient's age  Impression: No active pulmonary disease  Workstation performed: [id number]     Imaging Reports Reviewed Today Include:  Reviewed chest x-ray  Imaging Personally Reviewed by Myself Includes:  No  Procedure: Xr Chest Portable    Result Date: 1/28/2018  Narrative: CHEST INDICATION:  Fever COMPARISON:  None VIEWS:   AP frontal IMAGES:  1 FINDINGS:     Cardiomediastinal silhouette appears unremarkable  The lungs are clear  No pneumothorax or pleural effusion   Visualized osseous structures appear within normal limits for the patient's age  Impression: No active pulmonary disease  Workstation performed: [id number]        Recent Cultures (last 7 days):       Results from last 7 days  Lab Units 01/27/18  1617 01/27/18  1615 01/27/18  1549   BLOOD CULTURE  No Growth at 24 hrs  No Growth at 24 hrs   --    INFLUENZA A PCR   --   --  Detected*   INFLUENZA B PCR   --   --  None Detected   RSV PCR   --   --  None Detected       Last 24 Hours Medication List:     amLODIPine 10 mg Oral Daily   enoxaparin 40 mg Subcutaneous Daily   gabapentin 100 mg Oral TID   insulin glargine 45 Units Subcutaneous QAM   insulin lispro 1-5 Units Subcutaneous TID AC   insulin lispro 1-5 Units Subcutaneous HS   insulin lispro 6 Units Subcutaneous TID With Meals   oseltamivir 75 mg Oral Q12H ROCÍO   pantoprazole 40 mg Oral Early Morning   venlafaxine 37 5 mg Oral BID        Today, Patient Was Seen By: Jill Avitia MD    ** Please Note: Dragon 360 Dictation voice to text software may have been used in the creation of this document   **

## 2018-01-29 NOTE — ASSESSMENT & PLAN NOTE
· Secondary to influenza A infection  · Fever, leukocytosis  · Droplet precautions  · Started on Tamiflu  · Await blood cx results

## 2018-01-30 LAB
GLUCOSE SERPL-MCNC: 115 MG/DL (ref 65–140)
GLUCOSE SERPL-MCNC: 185 MG/DL (ref 65–140)
GLUCOSE SERPL-MCNC: 31 MG/DL (ref 65–140)
GLUCOSE SERPL-MCNC: 320 MG/DL (ref 65–140)
GLUCOSE SERPL-MCNC: 94 MG/DL (ref 65–140)

## 2018-01-30 PROCEDURE — 99232 SBSQ HOSP IP/OBS MODERATE 35: CPT | Performed by: INTERNAL MEDICINE

## 2018-01-30 PROCEDURE — 82948 REAGENT STRIP/BLOOD GLUCOSE: CPT

## 2018-01-30 RX ORDER — OSELTAMIVIR PHOSPHATE 75 MG/1
75 CAPSULE ORAL EVERY 12 HOURS SCHEDULED
Qty: 4 CAPSULE | Refills: 0 | Status: SHIPPED | OUTPATIENT
Start: 2018-01-30 | End: 2018-02-01

## 2018-01-30 RX ORDER — INSULIN GLARGINE 100 [IU]/ML
50 INJECTION, SOLUTION SUBCUTANEOUS DAILY
Qty: 10 ML | Refills: 0 | Status: SHIPPED | OUTPATIENT
Start: 2018-01-30 | End: 2018-03-20 | Stop reason: HOSPADM

## 2018-01-30 RX ADMIN — OSELTAMIVIR PHOSPHATE 75 MG: 75 CAPSULE ORAL at 22:19

## 2018-01-30 RX ADMIN — AMLODIPINE BESYLATE 10 MG: 10 TABLET ORAL at 10:12

## 2018-01-30 RX ADMIN — INSULIN LISPRO 3 UNITS: 100 INJECTION, SOLUTION INTRAVENOUS; SUBCUTANEOUS at 10:15

## 2018-01-30 RX ADMIN — GABAPENTIN 100 MG: 100 CAPSULE ORAL at 15:56

## 2018-01-30 RX ADMIN — GABAPENTIN 100 MG: 100 CAPSULE ORAL at 22:19

## 2018-01-30 RX ADMIN — ENOXAPARIN SODIUM 40 MG: 40 INJECTION SUBCUTANEOUS at 10:13

## 2018-01-30 RX ADMIN — SODIUM CHLORIDE 75 ML/HR: 0.9 INJECTION, SOLUTION INTRAVENOUS at 10:19

## 2018-01-30 RX ADMIN — INSULIN GLARGINE 50 UNITS: 100 INJECTION, SOLUTION SUBCUTANEOUS at 10:26

## 2018-01-30 RX ADMIN — INSULIN LISPRO 1 UNITS: 100 INJECTION, SOLUTION INTRAVENOUS; SUBCUTANEOUS at 18:55

## 2018-01-30 RX ADMIN — ACETAMINOPHEN 650 MG: 325 TABLET, FILM COATED ORAL at 22:19

## 2018-01-30 RX ADMIN — VENLAFAXINE 37.5 MG: 75 TABLET ORAL at 10:13

## 2018-01-30 RX ADMIN — VENLAFAXINE 37.5 MG: 75 TABLET ORAL at 18:54

## 2018-01-30 RX ADMIN — OSELTAMIVIR PHOSPHATE 75 MG: 75 CAPSULE ORAL at 10:12

## 2018-01-30 RX ADMIN — PANTOPRAZOLE SODIUM 40 MG: 40 TABLET, DELAYED RELEASE ORAL at 05:17

## 2018-01-30 RX ADMIN — ACETAMINOPHEN 650 MG: 325 TABLET, FILM COATED ORAL at 05:17

## 2018-01-30 RX ADMIN — ACETAMINOPHEN 650 MG: 325 TABLET, FILM COATED ORAL at 15:56

## 2018-01-30 RX ADMIN — GABAPENTIN 100 MG: 100 CAPSULE ORAL at 10:13

## 2018-01-30 NOTE — PROGRESS NOTES
Case management, Rossi Weaver, notified that patient is written for discharge and has prescriptions for insulin on  The chart  Scripts need to be arranged before patient can be discharged  Dr Jeremías Cr made aware

## 2018-01-30 NOTE — PROGRESS NOTES
[facility] Internal Medicine Progress Note  Patient: Humberto Mcknight 27 y o  female   MRN: [id number]  PCP: No primary care provider on file  Unit/Bed#: E5 -01 Encounter: [id number]  Date Of Visit: 18    Assessment:    Principal Problem:    Diabetic ketoacidosis associated with type 1 diabetes mellitus (HCC)  Active Problems:    Hypertension    Hepatitis C    GERD (gastroesophageal reflux disease)    Hyperglycemia    Heroin abuse    SIRS (systemic inflammatory response syndrome) (HCC)    Influenza A      Plan:    · DKA resolved needs further adjustments in insulin maintenance as continues to have pseudohyponatremia from hyperglycemia with blood sugars in 240-300 and Humalog with meals to 8 units and Lantus 50 units daily trying to obtain case management to address medication needs including insulin prior to return to [place] she is stable for discharge  · Hepatitis C  · Heroin abuse by history  · Influenza A, started on Tamiflu on the  to complete a 5 day course      VTE Pharmacologic Prophylaxis:   Pharmacologic: Enoxaparin (Lovenox)  Mechanical VTE Prophylaxis in Place: Yes    Discussions with Specialists or Other Care Team Provider:  Now    Time Spent for Care: 30 minutes  More than 50% of total time spent on counseling and coordination of care as described above  Subjective:   Ambulatory no acute distress no longer febrile at this point at [facility] yesterday    Objective:     Vitals:   Temp (24hrs), Av 4 °F (36 9 °C), Min:98 °F (36 7 °C), Max:98 7 °F (37 1 °C)    HR:  [89-95] 89  Resp:  [19-20] 20  BP: (108-121)/(57-71) 108/58  SpO2:  [96 %-99 %] 96 %  There is no height or weight on file to calculate BMI  Input and Output Summary (last 24 hours):        Intake/Output Summary (Last 24 hours) at 18 1247  Last data filed at 18 1100   Gross per 24 hour   Intake              720 ml   Output                0 ml   Net              720 ml       Physical Exam:     Physical Exam:   General appearance: alert, appears stated age and cooperative  Head: Normocephalic, without obvious abnormality, atraumatic  Lungs: clear to auscultation bilaterally  Heart: regular rate and rhythm  Abdomen: soft, non-tender; bowel sounds normal; no masses,  no organomegaly  Back: negative  Extremities: extremities normal, atraumatic, no cyanosis or edema  Neurologic: Grossly normal      Additional Data:     Labs:      Results from last 7 days  Lab Units 01/29/18  1242   WBC Thousand/uL 10 94*   HEMOGLOBIN g/dL 10 1*   HEMATOCRIT % 32 3*   PLATELETS Thousands/uL 261   NEUTROS PCT % 73   LYMPHS PCT % 19   MONOS PCT % 8   EOS PCT % 0       Results from last 7 days  Lab Units 01/29/18  1241  01/27/18  0509   SODIUM mmol/L 131*  < > 134*   POTASSIUM mmol/L 4 4  < > 3 9   CHLORIDE mmol/L 99*  < > 99*   CO2 mmol/L 23  < > 23   BUN mg/dL 8  < > 10   CREATININE mg/dL 0 64  < > 0 69   CALCIUM mg/dL 8 7  < > 7 8*   TOTAL PROTEIN g/dL  --   --  6 8   BILIRUBIN TOTAL mg/dL  --   --  0 41   ALK PHOS U/L  --   --  104   ALT U/L  --   --  14   AST U/L  --   --  10   GLUCOSE RANDOM mg/dL 272*  < > 319*   < > = values in this interval not displayed  * I Have Reviewed All Lab Data Listed Above  * Additional Pertinent Lab Tests [facility]ed: [facility]    Imaging:  Xr Chest Portable    Result Date: 1/28/2018  Narrative: CHEST INDICATION:  Fever COMPARISON:  None VIEWS:   AP frontal IMAGES:  1 FINDINGS:     Cardiomediastinal silhouette appears unremarkable  The lungs are clear  No pneumothorax or pleural effusion  Visualized osseous structures appear within normal limits for the patient's age  Impression: No active pulmonary disease   Workstation performed: [id number]     Imaging Reports Reviewed Today Include:  Reviewed chest x-ray  Imaging Personally Reviewed by Myself Includes:  No  Procedure: Xr Chest Portable    Result Date: 1/28/2018  Narrative: CHEST INDICATION:  Fever COMPARISON:  None VIEWS:   AP frontal IMAGES:  1 FINDINGS:     Cardiomediastinal silhouette appears unremarkable  The lungs are clear  No pneumothorax or pleural effusion  Visualized osseous structures appear within normal limits for the patient's age  Impression: No active pulmonary disease  Workstation performed: [id number]        Recent Cultures (last 7 days):       Results from last 7 days  Lab Units 01/27/18  1617 01/27/18  1615 01/27/18  1549   BLOOD CULTURE  No Growth at 48 hrs  No Growth at 48 hrs   --    INFLUENZA A PCR   --   --  Detected*   INFLUENZA B PCR   --   --  None Detected   RSV PCR   --   --  None Detected       Last 24 Hours Medication List:     Current Facility-Administered Medications:  acetaminophen 650 mg Oral Q6H PRN Maria Del Rosario Piger, GIRISH    amLODIPine 10 mg Oral Daily Maria Del Rosario Piger, PA-KIRSTY    enoxaparin 40 mg Subcutaneous Daily Maria Del Rosario Piger, PA-KIRSTY    gabapentin 100 mg Oral TID Maria Del Rosario Dillon, PA-KIRSTY    insulin glargine 50 Units Subcutaneous QAM Constance Tapia MD    insulin lispro 1-5 Units Subcutaneous TID AC Khurram Challenger, GIRISH    insulin lispro 1-5 Units Subcutaneous HS [employer] Challenger, GIRISH    insulin lispro 8 Units Subcutaneous TID With Meals Constance Tapia MD    ondansetron 4 mg Intravenous Q6H PRN Maci Knox PA-C    oseltamivir 75 mg Oral Q12H [address] Zacarias Colón MD    pantoprazole 40 mg Oral Early Morning Maria Del Rosario Dillon PA-C    sodium chloride 75 mL/hr Intravenous Continuous Zacarias Colón MD Last Rate: 75 mL/hr (01/30/18 1019)   venlafaxine 37 5 mg Oral BID Maci Knox PA-C         Today, Patient Was Seen By: Constance Tapia MD    ** Please Note: Dragon 360 Dictation voice to text software may have been used in the creation of this document   **

## 2018-01-31 VITALS
DIASTOLIC BLOOD PRESSURE: 68 MMHG | OXYGEN SATURATION: 98 % | WEIGHT: 160 LBS | TEMPERATURE: 98 F | RESPIRATION RATE: 18 BRPM | HEART RATE: 91 BPM | SYSTOLIC BLOOD PRESSURE: 118 MMHG

## 2018-01-31 LAB
GLUCOSE SERPL-MCNC: 112 MG/DL (ref 65–140)
GLUCOSE SERPL-MCNC: 163 MG/DL (ref 65–140)
GLUCOSE SERPL-MCNC: 215 MG/DL (ref 65–140)
GLUCOSE SERPL-MCNC: 51 MG/DL (ref 65–140)

## 2018-01-31 PROCEDURE — 82948 REAGENT STRIP/BLOOD GLUCOSE: CPT

## 2018-01-31 PROCEDURE — 99239 HOSP IP/OBS DSCHRG MGMT >30: CPT | Performed by: INTERNAL MEDICINE

## 2018-01-31 RX ADMIN — ACETAMINOPHEN 650 MG: 325 TABLET, FILM COATED ORAL at 03:47

## 2018-01-31 RX ADMIN — PANTOPRAZOLE SODIUM 40 MG: 40 TABLET, DELAYED RELEASE ORAL at 05:20

## 2018-01-31 RX ADMIN — ACETAMINOPHEN 650 MG: 325 TABLET, FILM COATED ORAL at 16:00

## 2018-01-31 RX ADMIN — VENLAFAXINE 37.5 MG: 75 TABLET ORAL at 08:43

## 2018-01-31 RX ADMIN — ACETAMINOPHEN 650 MG: 325 TABLET, FILM COATED ORAL at 09:48

## 2018-01-31 RX ADMIN — INSULIN LISPRO 2 UNITS: 100 INJECTION, SOLUTION INTRAVENOUS; SUBCUTANEOUS at 12:13

## 2018-01-31 RX ADMIN — GABAPENTIN 100 MG: 100 CAPSULE ORAL at 16:00

## 2018-01-31 RX ADMIN — ENOXAPARIN SODIUM 40 MG: 40 INJECTION SUBCUTANEOUS at 08:43

## 2018-01-31 RX ADMIN — OSELTAMIVIR PHOSPHATE 75 MG: 75 CAPSULE ORAL at 08:43

## 2018-01-31 RX ADMIN — INSULIN GLARGINE 50 UNITS: 100 INJECTION, SOLUTION SUBCUTANEOUS at 09:48

## 2018-01-31 RX ADMIN — GABAPENTIN 100 MG: 100 CAPSULE ORAL at 08:43

## 2018-01-31 NOTE — SOCIAL WORK
Patient came to [facility] to visit friends with some friends  Somehow, her friends left her here and she has [facility] staying [facility]  Ran out of her insulin, got sick and came to hospital  Patient independent with mobility; states she receives [employer] and will get her check on Sat  She then will be able to buy a bus ticket to get home  Patient states her script coverage is [employer] and can not be used here; uses [employer] in [place]; patient has also been going to the [facility] in [place] at [address] [place] [address] at [facility] and [place]; spoke with denilson Walton RN, there  Faxed scripts and medical cards to [facility] pharmacy  Costs of meds is $8 65  Ami Garsia said if costs was reasonable she would be able to cover them; costs of meds were calculated for indigent program; costs was $640 77  realized with insurance coverage CM would be able to cover costs of meds and syringes; costs of $9 90  Financial form completed for original costs of $640 77 then amended for $9 90  Discussed with supervisor, Leo Bending  Patient to  meds at [facility]; taxi voucher provided to patient to get [facility] to [facility]  No other d/c needs identified or expressed

## 2018-01-31 NOTE — DISCHARGE SUMMARY
Discharge Summary - [facility] Internal Medicine    Patient Information: Francois Chakraborty 27 y o  female MRN: [id number]  Unit/Bed#: E5 -01 Encounter: [id number]    Discharging Physician / Practitioner: Makenzie Rodríguez MD  PCP: No primary care provider on file  Admission Date: 1/26/2018  Discharge Date: 01/31/18    Reason for Admission:  DKA    Discharge Diagnoses:  Influenza A    Principal Problem:    Diabetic ketoacidosis associated with type 1 diabetes mellitus ([facility] [address] )  Active Problems:    Hypertension    Hepatitis C    GERD (gastroesophageal reflux disease)    Hyperglycemia    Heroin abuse    SIRS (systemic inflammatory response syndrome) (HCC)    Influenza A  Resolved Problems:    * No resolved hospital problems  *      Consultations During Hospital Stay:  · None    Procedures Performed:     Xr Chest Portable    Result Date: 1/28/2018  Narrative: CHEST INDICATION:  Fever COMPARISON:  None VIEWS:   AP frontal IMAGES:  1 FINDINGS:     Cardiomediastinal silhouette appears unremarkable  The lungs are clear  No pneumothorax or pleural effusion  Visualized osseous structures appear within normal limits for the patient's age  Impression: No active pulmonary disease  Workstation performed: [id number]         Significant Findings:     · 27year-old type 1 diabetic who presented with hyperglycemic ketoacidosis in relation to lack of intake over usual maintenance dosage of insulin  Apparently the patient was left off in this area she lives in [place] and friends left her off with no availability to getting back and had to be live in a homeless shelter in the meantime should she had no access to her maintenance dose ache of insulin which had been Lantus 50 units along with Humalog she had been taken at home    · Patient consequently also has a history of hepatitis C and heroin abuse  · Had presentation of fever and leukocytosis And tested positive be a PCR for influenzaA with rest of infectious workup largely negative she was placed on droplet precautions  · She was infused with IV fluids vigorously and closed her anion gap  · Noted to have hypertension placed on amlodipine 10 mg daily which she had taken his outpatient restarted Neurontin and Effexor  · Ongoing issue after stabilization medically was this patient's lack of ability to obtain a insulin due to cost constraints induration was been made to the hospital to obtain her insulin maintenance until she can get up to [place] once those arrangements made patient is stable for discharge she was given a prescription for Lantus insulin 15 units and 3 day course of Tamiflu to complete 5 day course    Incidental Findings:   · None    Test Results Pending at Discharge (will require follow up): · Non     Outpatient Tests Requested:  · None    Complications:  None    Hospital Course:     Jair Preston is a 27 y o  female patient who originally presented to the hospital on 1/26/2018 due to fever, nausea emesis high blood sugar, due to medical noncompliance  Please see above significant 5 hospital course and treatment plan    Condition at Discharge: good     Discharge Day Visit / Exam:     * Please refer to separate progress for these details *    Discharge instructions/Information to patient and family:   See after visit summary for information provided to patient and family  Provisions for Follow-Up Care:  See after visit summary for information related to follow-up care and any pertinent home health orders  Disposition:     Home    For Discharges to [facility] SNF:   · Not Applicable to this Patient - Not Applicable to this Patient      Discharge Statement:  I spent 45 minutes discharging the patient  This time was spent on the day of discharge  I had direct contact with the patient on the day of discharge   Greater than 50% of the total time was spent examining patient, answering all patient questions, arranging and discussing plan of care with patient as well as directly providing post-discharge instructions  Additional time then spent on discharge activities  Discharge Medications:  See after visit summary for reconciled discharge medications provided to patient and family  ** Please Note: Dragon 360 Dictation voice to text software may have been used in the creation of this document   **

## 2018-02-01 LAB
BACTERIA BLD CULT: NORMAL
BACTERIA BLD CULT: NORMAL

## 2018-02-01 NOTE — PLAN OF CARE
Problem: DISCHARGE PLANNING - CARE MANAGEMENT  Goal: Discharge to post-acute care or home with appropriate resources  INTERVENTIONS:  - Conduct assessment to determine patient/family and health care team treatment goals, and need for post-acute services based on payer coverage, community resources, and patient preferences, and barriers to discharge  - Address psychosocial, clinical, and financial barriers to discharge as identified in assessment in conjunction with the patient/family and health care team  - Arrange appropriate level of post-acute services according to patients   needs and preference and payer coverage in collaboration with the physician and health care team  - Communicate with and update the patient/family, physician, and health care team regarding progress on the discharge plan  - Arrange appropriate transportation to post-acute venues  Outcome: Adequate for Discharge  Patient provided assist with obtaining meds; taxi voucher provided to transport patient to [facility]

## 2018-02-09 ENCOUNTER — HOSPITAL ENCOUNTER (INPATIENT)
Facility: HOSPITAL | Age: 33
LOS: 7 days | Discharge: HOME | DRG: 853 | End: 2018-02-16
Attending: EMERGENCY MEDICINE | Admitting: INTERNAL MEDICINE
Payer: MEDICARE

## 2018-02-09 DIAGNOSIS — A41.9 SEPSIS DUE TO CELLULITIS (HCC): ICD-10-CM

## 2018-02-09 DIAGNOSIS — Z72.0 TOBACCO ABUSE: ICD-10-CM

## 2018-02-09 DIAGNOSIS — L03.115 CELLULITIS AND ABSCESS OF RIGHT LOWER EXTREMITY: ICD-10-CM

## 2018-02-09 DIAGNOSIS — L02.415 ABSCESS OF RIGHT THIGH: ICD-10-CM

## 2018-02-09 DIAGNOSIS — F11.10 HEROIN ABUSE (HCC): ICD-10-CM

## 2018-02-09 DIAGNOSIS — A41.9 SEPSIS (HCC): ICD-10-CM

## 2018-02-09 DIAGNOSIS — B18.2 CHRONIC HEPATITIS C WITHOUT HEPATIC COMA (HCC): ICD-10-CM

## 2018-02-09 DIAGNOSIS — L03.90 CELLULITIS: Primary | ICD-10-CM

## 2018-02-09 DIAGNOSIS — L02.415 CELLULITIS AND ABSCESS OF RIGHT LOWER EXTREMITY: ICD-10-CM

## 2018-02-09 DIAGNOSIS — L02.31 ABSCESS OF BUTTOCK, RIGHT: ICD-10-CM

## 2018-02-09 DIAGNOSIS — L03.90 SEPSIS DUE TO CELLULITIS (HCC): ICD-10-CM

## 2018-02-09 LAB
ALBUMIN SERPL BCP-MCNC: 3.2 G/DL (ref 3.5–5)
ALP SERPL-CCNC: 145 U/L (ref 46–116)
ALT SERPL W P-5'-P-CCNC: 13 U/L (ref 12–78)
ANION GAP SERPL CALCULATED.3IONS-SCNC: 10 MMOL/L (ref 4–13)
AST SERPL W P-5'-P-CCNC: 13 U/L (ref 5–45)
BASOPHILS # BLD AUTO: 0.03 THOUSANDS/ΜL (ref 0–0.1)
BASOPHILS NFR BLD AUTO: 0 % (ref 0–1)
BILIRUB DIRECT SERPL-MCNC: 0.16 MG/DL (ref 0–0.2)
BILIRUB SERPL-MCNC: 0.48 MG/DL (ref 0.2–1)
BUN SERPL-MCNC: 11 MG/DL (ref 5–25)
CALCIUM SERPL-MCNC: 9.4 MG/DL (ref 8.3–10.1)
CHLORIDE SERPL-SCNC: 100 MMOL/L (ref 100–108)
CO2 SERPL-SCNC: 28 MMOL/L (ref 21–32)
CREAT SERPL-MCNC: 0.68 MG/DL (ref 0.6–1.3)
EOSINOPHIL # BLD AUTO: 0.05 THOUSAND/ΜL (ref 0–0.61)
EOSINOPHIL NFR BLD AUTO: 0 % (ref 0–6)
ERYTHROCYTE [DISTWIDTH] IN BLOOD BY AUTOMATED COUNT: 16.5 % (ref 11.6–15.1)
GFR SERPL CREATININE-BSD FRML MDRD: 114 ML/MIN/1.73SQ M
GLUCOSE SERPL-MCNC: 97 MG/DL (ref 65–140)
HCT VFR BLD AUTO: 30.9 % (ref 34.8–46.1)
HGB BLD-MCNC: 9.5 G/DL (ref 11.5–15.4)
LACTATE SERPL-SCNC: 0.8 MMOL/L (ref 0.5–2)
LYMPHOCYTES # BLD AUTO: 1.95 THOUSANDS/ΜL (ref 0.6–4.47)
LYMPHOCYTES NFR BLD AUTO: 6 % (ref 14–44)
MAGNESIUM SERPL-MCNC: 2.1 MG/DL (ref 1.6–2.6)
MCH RBC QN AUTO: 26.9 PG (ref 26.8–34.3)
MCHC RBC AUTO-ENTMCNC: 30.7 G/DL (ref 31.4–37.4)
MCV RBC AUTO: 88 FL (ref 82–98)
MONOCYTES # BLD AUTO: 1.53 THOUSAND/ΜL (ref 0.17–1.22)
MONOCYTES NFR BLD AUTO: 5 % (ref 4–12)
NEUTROPHILS # BLD AUTO: 29.58 THOUSANDS/ΜL (ref 1.85–7.62)
NEUTS SEG NFR BLD AUTO: 89 % (ref 43–75)
NRBC BLD AUTO-RTO: 0 /100 WBCS
PLATELET # BLD AUTO: 426 THOUSANDS/UL (ref 149–390)
PMV BLD AUTO: 9.3 FL (ref 8.9–12.7)
POTASSIUM SERPL-SCNC: 3.8 MMOL/L (ref 3.5–5.3)
PROT SERPL-MCNC: 8.2 G/DL (ref 6.4–8.2)
RBC # BLD AUTO: 3.53 MILLION/UL (ref 3.81–5.12)
SODIUM SERPL-SCNC: 138 MMOL/L (ref 136–145)
WBC # BLD AUTO: 33.14 THOUSAND/UL (ref 4.31–10.16)

## 2018-02-09 PROCEDURE — 83735 ASSAY OF MAGNESIUM: CPT | Performed by: EMERGENCY MEDICINE

## 2018-02-09 PROCEDURE — 96365 THER/PROPH/DIAG IV INF INIT: CPT

## 2018-02-09 PROCEDURE — 36415 COLL VENOUS BLD VENIPUNCTURE: CPT | Performed by: EMERGENCY MEDICINE

## 2018-02-09 PROCEDURE — 83605 ASSAY OF LACTIC ACID: CPT | Performed by: EMERGENCY MEDICINE

## 2018-02-09 PROCEDURE — 80048 BASIC METABOLIC PNL TOTAL CA: CPT | Performed by: EMERGENCY MEDICINE

## 2018-02-09 PROCEDURE — 80076 HEPATIC FUNCTION PANEL: CPT | Performed by: EMERGENCY MEDICINE

## 2018-02-09 PROCEDURE — 85025 COMPLETE CBC W/AUTO DIFF WBC: CPT | Performed by: EMERGENCY MEDICINE

## 2018-02-09 RX ORDER — VANCOMYCIN HYDROCHLORIDE 1 G/200ML
15 INJECTION, SOLUTION INTRAVENOUS ONCE
Status: COMPLETED | OUTPATIENT
Start: 2018-02-09 | End: 2018-02-10

## 2018-02-09 RX ADMIN — SODIUM CHLORIDE 1000 ML: 0.9 INJECTION, SOLUTION INTRAVENOUS at 23:19

## 2018-02-09 RX ADMIN — VANCOMYCIN HYDROCHLORIDE 1000 MG: 1 INJECTION, SOLUTION INTRAVENOUS at 23:19

## 2018-02-10 PROBLEM — E11.9 DIABETES MELLITUS (HCC): Status: ACTIVE | Noted: 2018-02-10

## 2018-02-10 PROBLEM — L03.90 CELLULITIS: Status: ACTIVE | Noted: 2018-02-10

## 2018-02-10 LAB
ANION GAP SERPL CALCULATED.3IONS-SCNC: 9 MMOL/L (ref 4–13)
BUN SERPL-MCNC: 11 MG/DL (ref 5–25)
CALCIUM SERPL-MCNC: 8.7 MG/DL (ref 8.3–10.1)
CHLORIDE SERPL-SCNC: 102 MMOL/L (ref 100–108)
CO2 SERPL-SCNC: 25 MMOL/L (ref 21–32)
CREAT SERPL-MCNC: 0.72 MG/DL (ref 0.6–1.3)
ERYTHROCYTE [DISTWIDTH] IN BLOOD BY AUTOMATED COUNT: 16.8 % (ref 11.6–15.1)
EST. AVERAGE GLUCOSE BLD GHB EST-MCNC: 217 MG/DL
GFR SERPL CREATININE-BSD FRML MDRD: 110 ML/MIN/1.73SQ M
GLUCOSE SERPL-MCNC: 137 MG/DL (ref 65–140)
GLUCOSE SERPL-MCNC: 145 MG/DL (ref 65–140)
GLUCOSE SERPL-MCNC: 204 MG/DL (ref 65–140)
GLUCOSE SERPL-MCNC: 211 MG/DL (ref 65–140)
GLUCOSE SERPL-MCNC: 243 MG/DL (ref 65–140)
HBA1C MFR BLD: 9.2 % (ref 4.2–6.3)
HCT VFR BLD AUTO: 28.6 % (ref 34.8–46.1)
HGB BLD-MCNC: 8.7 G/DL (ref 11.5–15.4)
MCH RBC QN AUTO: 26.9 PG (ref 26.8–34.3)
MCHC RBC AUTO-ENTMCNC: 30.4 G/DL (ref 31.4–37.4)
MCV RBC AUTO: 89 FL (ref 82–98)
PLATELET # BLD AUTO: 356 THOUSANDS/UL (ref 149–390)
PMV BLD AUTO: 9.9 FL (ref 8.9–12.7)
POTASSIUM SERPL-SCNC: 3.9 MMOL/L (ref 3.5–5.3)
RBC # BLD AUTO: 3.23 MILLION/UL (ref 3.81–5.12)
SODIUM SERPL-SCNC: 136 MMOL/L (ref 136–145)
WBC # BLD AUTO: 27.63 THOUSAND/UL (ref 4.31–10.16)

## 2018-02-10 PROCEDURE — 85027 COMPLETE CBC AUTOMATED: CPT | Performed by: PHYSICIAN ASSISTANT

## 2018-02-10 PROCEDURE — 82948 REAGENT STRIP/BLOOD GLUCOSE: CPT

## 2018-02-10 PROCEDURE — 80048 BASIC METABOLIC PNL TOTAL CA: CPT | Performed by: PHYSICIAN ASSISTANT

## 2018-02-10 PROCEDURE — 99284 EMERGENCY DEPT VISIT MOD MDM: CPT

## 2018-02-10 PROCEDURE — 83036 HEMOGLOBIN GLYCOSYLATED A1C: CPT | Performed by: PHYSICIAN ASSISTANT

## 2018-02-10 PROCEDURE — 87040 BLOOD CULTURE FOR BACTERIA: CPT | Performed by: PHYSICIAN ASSISTANT

## 2018-02-10 PROCEDURE — 99223 1ST HOSP IP/OBS HIGH 75: CPT | Performed by: INTERNAL MEDICINE

## 2018-02-10 RX ORDER — INSULIN GLARGINE 100 [IU]/ML
50 INJECTION, SOLUTION SUBCUTANEOUS DAILY
Status: DISCONTINUED | OUTPATIENT
Start: 2018-02-10 | End: 2018-02-12

## 2018-02-10 RX ORDER — SODIUM CHLORIDE 9 MG/ML
125 INJECTION, SOLUTION INTRAVENOUS CONTINUOUS
Status: DISCONTINUED | OUTPATIENT
Start: 2018-02-10 | End: 2018-02-12

## 2018-02-10 RX ORDER — AMLODIPINE BESYLATE 10 MG/1
10 TABLET ORAL DAILY
Status: DISCONTINUED | OUTPATIENT
Start: 2018-02-10 | End: 2018-02-16 | Stop reason: HOSPADM

## 2018-02-10 RX ORDER — PANTOPRAZOLE SODIUM 40 MG/1
40 TABLET, DELAYED RELEASE ORAL
Status: DISCONTINUED | OUTPATIENT
Start: 2018-02-10 | End: 2018-02-16 | Stop reason: HOSPADM

## 2018-02-10 RX ORDER — NICOTINE 21 MG/24HR
1 PATCH, TRANSDERMAL 24 HOURS TRANSDERMAL DAILY
Status: DISCONTINUED | OUTPATIENT
Start: 2018-02-10 | End: 2018-02-16 | Stop reason: HOSPADM

## 2018-02-10 RX ORDER — ONDANSETRON 2 MG/ML
4 INJECTION INTRAMUSCULAR; INTRAVENOUS EVERY 4 HOURS PRN
Status: DISCONTINUED | OUTPATIENT
Start: 2018-02-10 | End: 2018-02-16 | Stop reason: HOSPADM

## 2018-02-10 RX ORDER — LORAZEPAM 2 MG/ML
1 INJECTION INTRAMUSCULAR EVERY 4 HOURS PRN
Status: DISCONTINUED | OUTPATIENT
Start: 2018-02-10 | End: 2018-02-16 | Stop reason: HOSPADM

## 2018-02-10 RX ORDER — VENLAFAXINE 37.5 MG/1
37.5 TABLET ORAL 2 TIMES DAILY
Status: DISCONTINUED | OUTPATIENT
Start: 2018-02-10 | End: 2018-02-16 | Stop reason: HOSPADM

## 2018-02-10 RX ORDER — ONDANSETRON 2 MG/ML
4 INJECTION INTRAMUSCULAR; INTRAVENOUS EVERY 6 HOURS PRN
Status: DISCONTINUED | OUTPATIENT
Start: 2018-02-10 | End: 2018-02-10

## 2018-02-10 RX ORDER — GABAPENTIN 100 MG/1
100 CAPSULE ORAL 3 TIMES DAILY
Status: DISCONTINUED | OUTPATIENT
Start: 2018-02-10 | End: 2018-02-16 | Stop reason: HOSPADM

## 2018-02-10 RX ORDER — ACETAMINOPHEN 325 MG/1
650 TABLET ORAL EVERY 6 HOURS PRN
Status: DISCONTINUED | OUTPATIENT
Start: 2018-02-10 | End: 2018-02-16 | Stop reason: HOSPADM

## 2018-02-10 RX ORDER — MAGNESIUM HYDROXIDE/ALUMINUM HYDROXICE/SIMETHICONE 120; 1200; 1200 MG/30ML; MG/30ML; MG/30ML
30 SUSPENSION ORAL EVERY 6 HOURS PRN
Status: DISCONTINUED | OUTPATIENT
Start: 2018-02-10 | End: 2018-02-16 | Stop reason: HOSPADM

## 2018-02-10 RX ORDER — KETOROLAC TROMETHAMINE 30 MG/ML
30 INJECTION, SOLUTION INTRAMUSCULAR; INTRAVENOUS EVERY 6 HOURS PRN
Status: DISPENSED | OUTPATIENT
Start: 2018-02-10 | End: 2018-02-15

## 2018-02-10 RX ORDER — VANCOMYCIN HYDROCHLORIDE 1 G/200ML
15 INJECTION, SOLUTION INTRAVENOUS EVERY 12 HOURS
Status: DISCONTINUED | OUTPATIENT
Start: 2018-02-10 | End: 2018-02-12

## 2018-02-10 RX ORDER — ACETAMINOPHEN 325 MG/1
650 TABLET ORAL ONCE
Status: COMPLETED | OUTPATIENT
Start: 2018-02-10 | End: 2018-02-10

## 2018-02-10 RX ADMIN — ACETAMINOPHEN 650 MG: 325 TABLET, FILM COATED ORAL at 12:42

## 2018-02-10 RX ADMIN — SODIUM CHLORIDE 125 ML/HR: 0.9 INJECTION, SOLUTION INTRAVENOUS at 19:54

## 2018-02-10 RX ADMIN — VENLAFAXINE 37.5 MG: 37.5 TABLET ORAL at 17:14

## 2018-02-10 RX ADMIN — VENLAFAXINE 37.5 MG: 37.5 TABLET ORAL at 08:59

## 2018-02-10 RX ADMIN — INSULIN LISPRO 1 UNITS: 100 INJECTION, SOLUTION INTRAVENOUS; SUBCUTANEOUS at 09:00

## 2018-02-10 RX ADMIN — ONDANSETRON 4 MG: 2 INJECTION INTRAMUSCULAR; INTRAVENOUS at 10:16

## 2018-02-10 RX ADMIN — INSULIN GLARGINE 50 UNITS: 100 INJECTION, SOLUTION SUBCUTANEOUS at 08:59

## 2018-02-10 RX ADMIN — NICOTINE 1 PATCH: 14 PATCH TRANSDERMAL at 08:57

## 2018-02-10 RX ADMIN — VANCOMYCIN HYDROCHLORIDE 1000 MG: 1 INJECTION, SOLUTION INTRAVENOUS at 10:29

## 2018-02-10 RX ADMIN — KETOROLAC TROMETHAMINE 30 MG: 30 INJECTION, SOLUTION INTRAMUSCULAR at 22:02

## 2018-02-10 RX ADMIN — ONDANSETRON 4 MG: 2 INJECTION INTRAMUSCULAR; INTRAVENOUS at 19:51

## 2018-02-10 RX ADMIN — ENOXAPARIN SODIUM 40 MG: 40 INJECTION SUBCUTANEOUS at 09:00

## 2018-02-10 RX ADMIN — GABAPENTIN 100 MG: 100 CAPSULE ORAL at 15:47

## 2018-02-10 RX ADMIN — INSULIN LISPRO 1 UNITS: 100 INJECTION, SOLUTION INTRAVENOUS; SUBCUTANEOUS at 22:02

## 2018-02-10 RX ADMIN — VANCOMYCIN HYDROCHLORIDE 1000 MG: 1 INJECTION, SOLUTION INTRAVENOUS at 22:07

## 2018-02-10 RX ADMIN — AMLODIPINE BESYLATE 10 MG: 10 TABLET ORAL at 08:59

## 2018-02-10 RX ADMIN — GABAPENTIN 100 MG: 100 CAPSULE ORAL at 22:00

## 2018-02-10 RX ADMIN — ACETAMINOPHEN 650 MG: 325 TABLET, FILM COATED ORAL at 00:10

## 2018-02-10 RX ADMIN — KETOROLAC TROMETHAMINE 30 MG: 30 INJECTION, SOLUTION INTRAMUSCULAR at 15:45

## 2018-02-10 RX ADMIN — SODIUM CHLORIDE 125 ML/HR: 0.9 INJECTION, SOLUTION INTRAVENOUS at 10:28

## 2018-02-10 RX ADMIN — GABAPENTIN 100 MG: 100 CAPSULE ORAL at 08:59

## 2018-02-10 RX ADMIN — ONDANSETRON 4 MG: 2 INJECTION INTRAMUSCULAR; INTRAVENOUS at 15:05

## 2018-02-10 RX ADMIN — PANTOPRAZOLE SODIUM 40 MG: 40 TABLET, DELAYED RELEASE ORAL at 05:29

## 2018-02-10 RX ADMIN — SODIUM CHLORIDE 125 ML/HR: 0.9 INJECTION, SOLUTION INTRAVENOUS at 02:28

## 2018-02-10 RX ADMIN — ACETAMINOPHEN 650 MG: 325 TABLET, FILM COATED ORAL at 07:24

## 2018-02-10 NOTE — ED NOTES
Dr Jack Lynn at [address] [place] [address], [address] [place] [address]  02/09/18 2367

## 2018-02-10 NOTE — H&P
History and Physical - [facility] Internal Medicine    Patient Information: Francy Sarabia 27 y o  female MRN: [id number]  Unit/Bed#: E5 -01 Encounter: [id number]  Admitting Physician: Ervin Finnegan PA-C  PCP: No primary care provider on file  Date of Admission:  02/10/18    Assessment/Plan:    Hospital Problem List:     Principal Problem:    SIRS (systemic inflammatory response syndrome) ([facility])  Active Problems:    Hypertension    Hepatitis C    GERD (gastroesophageal reflux disease)    Heroin abuse    Cellulitis    Diabetes mellitus ([facility] [address] )      Plan for the Primary Problem(s):  · SIRS/cellulitis right thigh  · Admit to med/surg  Continue IV vanco  Has history of MRSA and had had abscesses in the past  Check blood cultures  Of note, patient is homeless and is currently staying in a local shelter  She will soon run out of her home medications  Plan for Additional Problems:   · HTN- continue norvasc  · Hep C  · GERD- continue protonix  · Heroin abuse- admits to daily use  No withdrawal signs at this time  Will order ativan  Avoid narcotics  Will not be able to send home with IV access  · DM- has hx of admission for DKA  She uses her insulin but does not check her sugars because she does not have a meter  Will continue lantus dose and order accuchecks with sliding scale    VTE Prophylaxis: Enoxaparin (Lovenox)  / sequential compression device   Code Status: full code  POLST: There is no POLST form on file for this patient (pre-hospital)    Anticipated Length of Stay:  Patient will be admitted on an Inpatient basis with an anticipated length of stay of  Greater than 2 midnights  Justification for Hospital Stay: patient requires IV antibiotics    Total Time for Visit, including Counseling / Coordination of Care: 45 minutes  Greater than 50% of this total time spent on direct patient counseling and coordination of care      Chief Complaint:   Cellulitis right thigh    History of Present Illness:    Misa Llanes Ata is a 27 y o  female who presents with redness and swelling posterior aspect of the right thigh  She admits to daily heroin use  She has a history of abscesses and MRSA  She is currently homeless and living in a shelter  She states that the swelling and redness started 3-4 days ago  She denies injecting there  She had fever and nausea today  Review of Systems:    Review of Systems   Constitutional: Positive for fever  HENT: Negative  Eyes: Negative  Respiratory: Negative  Cardiovascular: Negative  Gastrointestinal: Positive for nausea  Endocrine: Negative  Genitourinary: Negative  Musculoskeletal: Positive for myalgias  Skin: Positive for color change  Allergic/Immunologic: Negative  Neurological: Negative  Hematological: Negative  Psychiatric/Behavioral: Negative  Past Medical and Surgical History:     Past Medical History:   Diagnosis Date    Diabetes mellitus ([facility] [address] )     GERD (gastroesophageal reflux disease)     Hepatitis C     Hypertension        Past Surgical History:   Procedure Laterality Date    BACK SURGERY      lumbar     EYE SURGERY      bilateral       Meds/Allergies:    Prior to Admission medications    Medication Sig Start Date End Date Taking?  Authorizing Provider   amLODIPine (NORVASC) 10 mg tablet Take 10 mg by mouth daily   Yes Historical Provider, MD   gabapentin (NEURONTIN) 100 mg capsule Take 100 mg by mouth 3 (three) times a day   Yes Historical Provider, MD   insulin glargine (LANTUS) 100 units/mL subcutaneous injection Inject 50 Units under the skin daily 1/30/18  Yes Robby Gonzales MD   insulin lispro (HumaLOG) 100 units/mL injection Inject 8 Units under the skin 3 (three) times a day with meals 1/30/18  Yes Robby Gonzales MD   pantoprazole (PROTONIX) 40 mg tablet Take 40 mg by mouth daily   Yes Historical Provider, MD   venlafaxine (EFFEXOR) 37 5 mg tablet Take 37 5 mg by mouth 2 (two) times a day   Yes Historical Provider, MD I have reviewed home medications with patient personally  Allergies: Allergies   Allergen Reactions    Amoxicillin Rash    Vicodin [Hydrocodone-Acetaminophen] Abdominal Pain       Social History:     Marital Status: Single   Occupation: unemployed  Patient Pre-hospital Living Situation: homeless  Patient Pre-hospital Level of Mobility: ambulatory  Patient Pre-hospital Diet Restrictions: diabetic  Substance Use History:   History   Alcohol Use No     History   Smoking Status    Light Tobacco Smoker   Smokeless Tobacco    Never Used     History   Drug Use    Types: Heroin     Comment: Antonieta/MDMA history of this        Family History:    non-contributory    Physical Exam:     Vitals:   Blood Pressure: 121/61 (02/10/18 0042)  Pulse: (!) 115 (02/10/18 0042)  Temperature: 100 1 °F (37 8 °C) (02/10/18 0042)  Temp Source: Temporal (02/10/18 0042)  Respirations: 18 (02/10/18 0042)  Height: 5' 7" (170 2 cm) (02/10/18 0042)  Weight - Scale: 72 6 kg (160 lb) (02/10/18 0042)  SpO2: 93 % (02/10/18 0042)    Physical Exam   Constitutional: She is oriented to person, place, and time  She appears well-developed and well-nourished  No distress  HENT:   Head: Normocephalic and atraumatic  Eyes: Conjunctivae are normal  Pupils are equal, round, and reactive to light  Neck: Normal range of motion  Neck supple  No JVD present  No tracheal deviation present  No thyromegaly present  Cardiovascular: Normal rate and regular rhythm  Pulmonary/Chest: Effort normal and breath sounds normal  No respiratory distress  She has no wheezes  Abdominal: Soft  Bowel sounds are normal  She exhibits no distension and no mass  There is no tenderness  There is no rebound and no guarding  Musculoskeletal:   Right posterior thigh swelling and redness  No drainage   Lymphadenopathy:     She has no cervical adenopathy  Neurological: She is alert and oriented to person, place, and time  Skin: Skin is warm and dry   She is not diaphoretic  There is erythema  Psychiatric: She has a normal mood and affect  Her behavior is normal    Vitals reviewed  Additional Data:     Lab Results: I have personally reviewed pertinent reports  Results from last 7 days  Lab Units 02/09/18  2306   WBC Thousand/uL 33 14*   HEMOGLOBIN g/dL 9 5*   HEMATOCRIT % 30 9*   PLATELETS Thousands/uL 426*   NEUTROS PCT % 89*   LYMPHS PCT % 6*   MONOS PCT % 5   EOS PCT % 0       Results from last 7 days  Lab Units 02/09/18  2306   SODIUM mmol/L 138   POTASSIUM mmol/L 3 8   CHLORIDE mmol/L 100   CO2 mmol/L 28   BUN mg/dL 11   CREATININE mg/dL 0 68   CALCIUM mg/dL 9 4   TOTAL PROTEIN g/dL 8 2   BILIRUBIN TOTAL mg/dL 0 48   ALK PHOS U/L 145*   ALT U/L 13   AST U/L 13   GLUCOSE RANDOM mg/dL 97           Imaging: I have personally reviewed pertinent reports  Xr Chest Portable    Result Date: 1/28/2018  Narrative: CHEST INDICATION:  Fever COMPARISON:  None VIEWS:   AP frontal IMAGES:  1 FINDINGS:     Cardiomediastinal silhouette appears unremarkable  The lungs are clear  No pneumothorax or pleural effusion  Visualized osseous structures appear within normal limits for the patient's age  Impression: No active pulmonary disease  Workstation performed: [id number]       EKG, Pathology, and Other Studies Reviewed on Admission:   · EKG: none    Allscripts / Epic Records Reviewed: Yes     ** Please Note: This note has been constructed using a voice recognition system   **

## 2018-02-10 NOTE — ED PROVIDER NOTES
History  Chief Complaint   Patient presents with    Abscess     Patient reports abscess to back of R thigh for the last 3-4 days  Patient reports subjective fevers and nausea  Patient is diabetic who does not check her sugars and "just gives myself insulin " Patient IV heroin user who last used at 1500 today  28 YO female with Hx of DM and current IVDA presents with pain, swelling and redness to the Right thigh  Pt is unsure regarding etiology, states previous abscesses in the past  Pt denies known fevers  She is currently homeless  Recent Hx of DKA and admission for this  Pt states she continues to use heroin, injecting into the neck at this time  Pt denies CP/SOB/F/C/N/V/D/C, no dysuria, burning on urination or blood in urine  History provided by:  Patient   used: No    Abscess   Location:  Leg  Leg abscess location:  R upper leg  Abscess quality: induration, redness and warmth    Red streaking: no    Duration:  3 days  Progression:  Worsening  Chronicity:  New  Context: diabetes and injected drug use    Relieved by:  Nothing  Worsened by:  Nothing  Ineffective treatments:  None tried  Associated symptoms: no fatigue, no fever and no vomiting        Prior to Admission Medications   Prescriptions Last Dose Informant Patient Reported? Taking?    amLODIPine (NORVASC) 10 mg tablet   Yes Yes   Sig: Take 10 mg by mouth daily   gabapentin (NEURONTIN) 100 mg capsule   Yes Yes   Sig: Take 100 mg by mouth 3 (three) times a day   insulin glargine (LANTUS) 100 units/mL subcutaneous injection   No Yes   Sig: Inject 50 Units under the skin daily   insulin lispro (HumaLOG) 100 units/mL injection   No Yes   Sig: Inject 8 Units under the skin 3 (three) times a day with meals   pantoprazole (PROTONIX) 40 mg tablet   Yes Yes   Sig: Take 40 mg by mouth daily   venlafaxine (EFFEXOR) 37 5 mg tablet   Yes Yes   Sig: Take 37 5 mg by mouth 2 (two) times a day      Facility-Administered Medications: None Past Medical History:   Diagnosis Date    Diabetes mellitus ([facility] [address] )     GERD (gastroesophageal reflux disease)     Hepatitis C     Hypertension        Past Surgical History:   Procedure Laterality Date    BACK SURGERY      lumbar     EYE SURGERY      bilateral       History reviewed  No pertinent family history  I have reviewed and agree with the history as documented  Social History   Substance Use Topics    Smoking status: Light Tobacco Smoker    Smokeless tobacco: Never Used    Alcohol use No        Review of Systems   Constitutional: Negative for chills, fatigue and fever  HENT: Negative for dental problem  Eyes: Negative for visual disturbance  Respiratory: Negative for shortness of breath  Cardiovascular: Negative for chest pain  Gastrointestinal: Negative for abdominal pain, diarrhea and vomiting  Genitourinary: Negative for dysuria and frequency  Musculoskeletal: Negative for arthralgias  Skin: Negative for rash  Neurological: Negative for dizziness, weakness and light-headedness  Psychiatric/Behavioral: Negative for agitation, behavioral problems and confusion  All other systems reviewed and are negative  Physical Exam  ED Triage Vitals [02/09/18 2148]   Temperature Pulse Respirations Blood Pressure SpO2   100 4 °F (38 °C) (!) 123 18 131/66 95 %      Temp Source Heart Rate Source Patient Position - Orthostatic VS BP Location FiO2 (%)   Oral Monitor Lying Left arm --      Pain Score       Worst Possible Pain           Orthostatic Vital Signs  Vitals:    02/09/18 2148 02/09/18 2322 02/10/18 0042   BP: 131/66 131/66 121/61   Pulse: (!) 123 (!) 121 (!) 115   Patient Position - Orthostatic VS: Lying Lying Lying       Physical Exam   Constitutional: She is oriented to person, place, and time  She appears well-developed and well-nourished  HENT:   Head: Normocephalic and atraumatic  Eyes: EOM are normal    Neck: Normal range of motion     Cardiovascular: Normal rate, regular rhythm and normal heart sounds  Pulmonary/Chest: Effort normal and breath sounds normal    Abdominal: Soft  There is no tenderness  Musculoskeletal: Normal range of motion  Neurological: She is alert and oriented to person, place, and time  Skin: Skin is warm and dry  Rash noted  Redness, erythema and induration over the posterior aspect of the Right thigh, tender, warm  Psychiatric: She has a normal mood and affect  Her behavior is normal  Thought content normal    Nursing note and vitals reviewed        ED Medications  Medications   sodium chloride 0 9 % bolus 1,000 mL (0 mL Intravenous Stopped 2/10/18 0037)   vancomycin (VANCOCIN) IVPB (premix) 1,000 mg (0 mg Intravenous Stopped 2/10/18 0037)   acetaminophen (TYLENOL) tablet 650 mg (650 mg Oral Given 2/10/18 0010)       Diagnostic Studies  Results Reviewed     Procedure Component Value Units Date/Time    CBC and differential [[id number]]  (Abnormal) Collected:  02/09/18 2306    Lab Status:  Final result Specimen:  Blood from Arm, Left Updated:  02/09/18 2337     WBC 33 14 (HH) Thousand/uL      RBC 3 53 (L) Million/uL      Hemoglobin 9 5 (L) g/dL      Hematocrit 30 9 (L) %      MCV 88 fL      MCH 26 9 pg      MCHC 30 7 (L) g/dL      RDW 16 5 (H) %      MPV 9 3 fL      Platelets 937 (H) Thousands/uL      nRBC 0 /100 WBCs      Neutrophils Relative 89 (H) %      Lymphocytes Relative 6 (L) %      Monocytes Relative 5 %      Eosinophils Relative 0 %      Basophils Relative 0 %      Neutrophils Absolute 29 58 (H) Thousands/µL      Lymphocytes Absolute 1 95 Thousands/µL      Monocytes Absolute 1 53 (H) Thousand/µL      Eosinophils Absolute 0 05 Thousand/µL      Basophils Absolute 0 03 Thousands/µL     Basic metabolic panel [[id number]] Collected:  02/09/18 2306    Lab Status:  Final result Specimen:  Blood from Arm, Left Updated:  02/09/18 2333     Sodium 138 mmol/L      Potassium 3 8 mmol/L      Chloride 100 mmol/L      CO2 28 mmol/L      Anion Gap 10 mmol/L      BUN 11 mg/dL      Creatinine 0 68 mg/dL      Glucose 97 mg/dL      Calcium 9 4 mg/dL      eGFR 114 ml/min/1 73sq m     Narrative:         National Kidney Disease Education Program recommendations are as follows:  GFR calculation is accurate only with a steady state creatinine  Chronic Kidney disease less than 60 ml/min/1 73 sq  meters  Kidney failure less than 15 ml/min/1 73 sq  meters  Hepatic function panel [[id number]]  (Abnormal) Collected:  02/09/18 2306    Lab Status:  Final result Specimen:  Blood from Arm, Left Updated:  02/09/18 2333     Total Bilirubin 0 48 mg/dL      Bilirubin, Direct 0 16 mg/dL      Alkaline Phosphatase 145 (H) U/L      AST 13 U/L      ALT 13 U/L      Total Protein 8 2 g/dL      Albumin 3 2 (L) g/dL     Magnesium [[id number]]  (Normal) Collected:  02/09/18 2306    Lab Status:  Final result Specimen:  Blood from Arm, Left Updated:  02/09/18 2333     Magnesium 2 1 mg/dL     Lactic acid, plasma [[id number]]  (Normal) Collected:  02/09/18 2306    Lab Status:  Final result Specimen:  Blood from Arm, Left Updated:  02/09/18 2330     LACTIC ACID 0 8 mmol/L     Narrative:         Result may be elevated if tourniquet was used during collection  POCT urinalysis dipstick [[id number]]     Lab Status:  No result Specimen:  Urine     POCT pregnancy, urine [[id number]]     Lab Status:  No result                  No orders to display              Procedures  Procedures       Phone Contacts  ED Phone Contact    ED Course  ED Course                                MDM  Number of Diagnoses or Management Options  Cellulitis: new and requires workup  Sepsis [facility]): new and requires workup  Diagnosis management comments: 1  Leg pain - Pt with infection to the posterior thigh, redness and warmth  No discernable fluctuance  Pt is tachycardic, elevated (though afebrile) temperature  Will obtain cultures, lactic acid, CBC, electrolytes  Start vancomycin      Pt with SIRS criteria, infection on leg, there is no obvious drainable collection, will admit for sepsis 2/2 cellulitis  Amount and/or Complexity of Data Reviewed  Clinical lab tests: ordered and reviewed  Obtain history from someone other than the patient: yes  Review and summarize past medical records: yes    Patient Progress  Patient progress: stable    CritCare Time    Disposition  Final diagnoses:   Cellulitis   Sepsis ([facility] )     Time reflects when diagnosis was documented in both MDM as applicable and the Disposition within this note     Time User Action Codes Description Comment    2/9/2018 11:58 PM Javier SANTANA Add [L03 90] Cellulitis     2/9/2018 11:58 PM Javier SANTANA Add [A41 9] Sepsis [facility])       ED Disposition     ED Disposition Condition Comment    Admit  Case was discussed with Agustin Rios and the patient's admission status was agreed to be Admission Status: inpatient status to the service of Dr Bing Llanos   Follow-up Information    None       Current Discharge Medication List      CONTINUE these medications which have NOT CHANGED    Details   amLODIPine (NORVASC) 10 mg tablet Take 10 mg by mouth daily      gabapentin (NEURONTIN) 100 mg capsule Take 100 mg by mouth 3 (three) times a day      insulin glargine (LANTUS) 100 units/mL subcutaneous injection Inject 50 Units under the skin daily  Qty: 10 mL, Refills: 0    Associated Diagnoses: Diabetic ketoacidosis without coma associated with type 1 diabetes mellitus ([facility] ); Hepatitis C virus infection without hepatic coma, unspecified chronicity; Influenza A      insulin lispro (HumaLOG) 100 units/mL injection Inject 8 Units under the skin 3 (three) times a day with meals  Qty: 3 mL, Refills: 0    Associated Diagnoses: Diabetic ketoacidosis without coma associated with type 1 diabetes mellitus ([facility] ); Hepatitis C virus infection without hepatic coma, unspecified chronicity;  Influenza A      pantoprazole (PROTONIX) 40 mg tablet Take 40 mg by mouth daily      venlafaxine (EFFEXOR) 37 5 mg tablet Take 37 5 mg by mouth 2 (two) times a day           No discharge procedures on file      ED Provider  Electronically Signed by           Pooja Lee MD  02/10/18 4392

## 2018-02-10 NOTE — ED NOTES
Patients oxygen saturation noted to be between 88-89% on room air while sleeping  Patient placed on 2L of oxygen via nasal canula at this time   Oxygen saturation at 94%       Valeriano Kamara RN  02/09/18 3625

## 2018-02-11 PROBLEM — A41.9 SEPSIS AFFECTING SKIN: Status: ACTIVE | Noted: 2018-01-27

## 2018-02-11 PROBLEM — L03.90 SEPSIS DUE TO CELLULITIS (HCC): Status: ACTIVE | Noted: 2018-01-27

## 2018-02-11 PROBLEM — L02.415 CELLULITIS AND ABSCESS OF RIGHT LOWER EXTREMITY: Status: ACTIVE | Noted: 2018-02-10

## 2018-02-11 PROBLEM — L03.115 CELLULITIS OF RIGHT LOWER EXTREMITY: Status: ACTIVE | Noted: 2018-02-10

## 2018-02-11 PROBLEM — E10.65 TYPE 1 DIABETES MELLITUS WITH HYPERGLYCEMIA (HCC): Status: ACTIVE | Noted: 2018-02-10

## 2018-02-11 LAB
GLUCOSE SERPL-MCNC: 108 MG/DL (ref 65–140)
GLUCOSE SERPL-MCNC: 120 MG/DL (ref 65–140)
GLUCOSE SERPL-MCNC: 196 MG/DL (ref 65–140)
VANCOMYCIN TROUGH SERPL-MCNC: 6.5 UG/ML (ref 10–20)

## 2018-02-11 PROCEDURE — 82948 REAGENT STRIP/BLOOD GLUCOSE: CPT

## 2018-02-11 PROCEDURE — 87186 SC STD MICRODIL/AGAR DIL: CPT | Performed by: SURGERY

## 2018-02-11 PROCEDURE — 87147 CULTURE TYPE IMMUNOLOGIC: CPT | Performed by: SURGERY

## 2018-02-11 PROCEDURE — 99223 1ST HOSP IP/OBS HIGH 75: CPT | Performed by: INTERNAL MEDICINE

## 2018-02-11 PROCEDURE — 87070 CULTURE OTHR SPECIMN AEROBIC: CPT | Performed by: SURGERY

## 2018-02-11 PROCEDURE — 87205 SMEAR GRAM STAIN: CPT | Performed by: SURGERY

## 2018-02-11 PROCEDURE — 99222 1ST HOSP IP/OBS MODERATE 55: CPT | Performed by: SURGERY

## 2018-02-11 PROCEDURE — 99232 SBSQ HOSP IP/OBS MODERATE 35: CPT | Performed by: INTERNAL MEDICINE

## 2018-02-11 PROCEDURE — 80202 ASSAY OF VANCOMYCIN: CPT | Performed by: INTERNAL MEDICINE

## 2018-02-11 PROCEDURE — 10060 I&D ABSCESS SIMPLE/SINGLE: CPT | Performed by: SURGERY

## 2018-02-11 PROCEDURE — 0J9L0ZZ DRAINAGE OF RIGHT UPPER LEG SUBCUTANEOUS TISSUE AND FASCIA, OPEN APPROACH: ICD-10-PCS | Performed by: SURGERY

## 2018-02-11 RX ORDER — LIDOCAINE HYDROCHLORIDE 10 MG/ML
10 INJECTION, SOLUTION EPIDURAL; INFILTRATION; INTRACAUDAL; PERINEURAL ONCE
Status: DISCONTINUED | OUTPATIENT
Start: 2018-02-11 | End: 2018-02-16 | Stop reason: HOSPADM

## 2018-02-11 RX ORDER — LIDOCAINE HYDROCHLORIDE 10 MG/ML
INJECTION, SOLUTION EPIDURAL; INFILTRATION; INTRACAUDAL; PERINEURAL
Status: COMPLETED
Start: 2018-02-11 | End: 2018-02-11

## 2018-02-11 RX ADMIN — SODIUM CHLORIDE 125 ML/HR: 0.9 INJECTION, SOLUTION INTRAVENOUS at 21:24

## 2018-02-11 RX ADMIN — LORAZEPAM 1 MG: 2 INJECTION INTRAMUSCULAR; INTRAVENOUS at 10:46

## 2018-02-11 RX ADMIN — ONDANSETRON 4 MG: 2 INJECTION INTRAMUSCULAR; INTRAVENOUS at 03:40

## 2018-02-11 RX ADMIN — KETOROLAC TROMETHAMINE 30 MG: 30 INJECTION, SOLUTION INTRAMUSCULAR at 04:12

## 2018-02-11 RX ADMIN — KETOROLAC TROMETHAMINE 30 MG: 30 INJECTION, SOLUTION INTRAMUSCULAR at 16:24

## 2018-02-11 RX ADMIN — KETOROLAC TROMETHAMINE 30 MG: 30 INJECTION, SOLUTION INTRAMUSCULAR at 10:47

## 2018-02-11 RX ADMIN — VENLAFAXINE 37.5 MG: 37.5 TABLET ORAL at 17:17

## 2018-02-11 RX ADMIN — VANCOMYCIN HYDROCHLORIDE 1000 MG: 1 INJECTION, SOLUTION INTRAVENOUS at 12:13

## 2018-02-11 RX ADMIN — INSULIN GLARGINE 50 UNITS: 100 INJECTION, SOLUTION SUBCUTANEOUS at 08:48

## 2018-02-11 RX ADMIN — GABAPENTIN 100 MG: 100 CAPSULE ORAL at 20:28

## 2018-02-11 RX ADMIN — LORAZEPAM 1 MG: 2 INJECTION INTRAMUSCULAR; INTRAVENOUS at 04:17

## 2018-02-11 RX ADMIN — GABAPENTIN 100 MG: 100 CAPSULE ORAL at 08:48

## 2018-02-11 RX ADMIN — ONDANSETRON 4 MG: 2 INJECTION INTRAMUSCULAR; INTRAVENOUS at 17:12

## 2018-02-11 RX ADMIN — GABAPENTIN 100 MG: 100 CAPSULE ORAL at 17:12

## 2018-02-11 RX ADMIN — ACETAMINOPHEN 650 MG: 325 TABLET, FILM COATED ORAL at 07:48

## 2018-02-11 RX ADMIN — LIDOCAINE HYDROCHLORIDE: 10 INJECTION, SOLUTION EPIDURAL; INFILTRATION; INTRACAUDAL; PERINEURAL at 11:27

## 2018-02-11 RX ADMIN — SODIUM CHLORIDE 125 ML/HR: 0.9 INJECTION, SOLUTION INTRAVENOUS at 04:13

## 2018-02-11 RX ADMIN — KETOROLAC TROMETHAMINE 30 MG: 30 INJECTION, SOLUTION INTRAMUSCULAR at 22:08

## 2018-02-11 RX ADMIN — AMLODIPINE BESYLATE 10 MG: 10 TABLET ORAL at 08:48

## 2018-02-11 RX ADMIN — INSULIN LISPRO 1 UNITS: 100 INJECTION, SOLUTION INTRAVENOUS; SUBCUTANEOUS at 21:26

## 2018-02-11 RX ADMIN — ONDANSETRON 4 MG: 2 INJECTION INTRAMUSCULAR; INTRAVENOUS at 07:49

## 2018-02-11 RX ADMIN — PANTOPRAZOLE SODIUM 40 MG: 40 TABLET, DELAYED RELEASE ORAL at 05:55

## 2018-02-11 RX ADMIN — VENLAFAXINE 37.5 MG: 37.5 TABLET ORAL at 08:48

## 2018-02-11 RX ADMIN — LORAZEPAM 1 MG: 2 INJECTION INTRAMUSCULAR; INTRAVENOUS at 22:08

## 2018-02-11 RX ADMIN — LORAZEPAM 1 MG: 2 INJECTION INTRAMUSCULAR; INTRAVENOUS at 17:12

## 2018-02-11 RX ADMIN — VANCOMYCIN HYDROCHLORIDE 1000 MG: 1 INJECTION, SOLUTION INTRAVENOUS at 22:08

## 2018-02-11 RX ADMIN — SODIUM CHLORIDE 125 ML/HR: 0.9 INJECTION, SOLUTION INTRAVENOUS at 12:12

## 2018-02-11 RX ADMIN — ENOXAPARIN SODIUM 40 MG: 40 INJECTION SUBCUTANEOUS at 08:47

## 2018-02-11 RX ADMIN — NICOTINE 1 PATCH: 14 PATCH TRANSDERMAL at 08:47

## 2018-02-11 RX ADMIN — ACETAMINOPHEN 650 MG: 325 TABLET, FILM COATED ORAL at 20:28

## 2018-02-11 NOTE — ASSESSMENT & PLAN NOTE
Poorly controlled due to noncompliance in setting of IVDU, homelessness  A1c 9 2  Risk factor for infection    · Continue management as per the primary service

## 2018-02-11 NOTE — PROCEDURES
Incision and drain  Date/Time: 2/11/2018 11:07 AM  Performed by: Molly Reyes by: Bharat Villarreal     Patient location:  Bedside  Consent:     Consent obtained:  Written    Consent given by:  Patient    Risks discussed:  Infection  Universal protocol:     Patient identity confirmed:  Verbally with patient  Location:     Type:  Abscess    Location: right thigh  Pre-procedure details:     Skin preparation:  Antiseptic wash  Anesthesia (see MAR for exact dosages): Anesthesia method:  Local infiltration    Local anesthetic:  Lidocaine 1% w/o epi  Procedure details:     Complexity:  Simple    Incision types:  Elliptical    Scalpel blade:  15    Incision depth:  Subcutaneous    Drainage:  Purulent    Drainage amount: Moderate    Wound treatment:  Packing placed    Packing materials:  1/2 in gauze  Post-procedure details:     Patient tolerance of procedure:   Tolerated well, no immediate complications

## 2018-02-11 NOTE — CONSULTS
Consultation - General Surgery   Francy Sarabia 27 y o  female MRN: [id number]  Unit/Bed#: E5 -01 Encounter: [id number]    Assessment/Plan     Assessment/Plan:  Right thigh cellulitis and abscess  Continue IV antibiotics  Plan for incision and drainage and will send cultures  History of MRSA in the past    History of Present Illness     HPI:  Francy Sarabia is a 27 y o  female who presents with pain and swelling of her right thigh  She has a history of type 1 diabetes hepatitis C an active IV heroin abuse  She is currently living in a homeless shelter  She noticed some redness and swelling of her right thigh came the ER for evaluation  She was slightly tachycardic leukocytosis and low-grade fever and was admitted for IV antibiotics       Inpatient consult to Acute Care Surgery  Consult performed by: Kristi Roman ordered by: Kurtis Davis          Review of Systems   Constitutional: Positive for fatigue and fever  Negative for chills and unexpected weight change  HENT: Negative for congestion, sore throat and trouble swallowing  Eyes: Negative for discharge and itching  Respiratory: Negative for cough, shortness of breath and wheezing  Cardiovascular: Negative for chest pain and leg swelling  Gastrointestinal: Negative for abdominal distention and nausea  Endocrine: Negative for cold intolerance and heat intolerance  Genitourinary: Negative for difficulty urinating, dysuria and frequency  Musculoskeletal: Negative for arthralgias, gait problem and joint swelling  Skin: Positive for color change  Negative for wound  Neurological: Negative for dizziness, numbness and headaches  Psychiatric/Behavioral: Negative for agitation and confusion  The patient is not nervous/anxious          Historical Information   Past Medical History:   Diagnosis Date    Diabetes mellitus ([address] )     GERD (gastroesophageal reflux disease)     Hepatitis C     Hypertension      Past Surgical History: Procedure Laterality Date    BACK SURGERY      lumbar     EYE SURGERY      bilateral     Social History   History   Alcohol Use No     History   Drug Use    Types: Heroin     Comment: Antonieta/MDMA history of this      History   Smoking Status    Light Tobacco Smoker   Smokeless Tobacco    Never Used     Family History: non-contributory    Meds/Allergies   all current active meds have been reviewed  Allergies   Allergen Reactions    Amoxicillin Rash    Vicodin [Hydrocodone-Acetaminophen] Abdominal Pain       Objective   First Vitals:   Blood Pressure: 131/66 (02/09/18 2148)  Pulse: (!) 123 (02/09/18 2148)  Temperature: 100 4 °F (38 °C) (02/09/18 2148)  Temp Source: Oral (02/09/18 2148)  Respirations: 18 (02/09/18 2148)  Height: 5' 7" (170 2 cm) (02/10/18 0042)  Weight - Scale: 72 6 kg (160 lb) (02/09/18 2148)  SpO2: 95 % (02/09/18 2148)    Current Vitals:   Blood Pressure: 110/58 (02/11/18 0730)  Pulse: (!) 107 (02/11/18 0730)  Temperature: 98 3 °F (36 8 °C) (02/11/18 0730)  Temp Source: Tympanic (02/11/18 0730)  Respirations: 18 (02/11/18 0730)  Height: 5' 7" (170 2 cm) (02/10/18 0042)  Weight - Scale: 72 6 kg (160 lb) (02/10/18 0042)  SpO2: 90 % (02/11/18 0730)    No intake or output data in the 24 hours ending 02/11/18 1035    Invasive Devices     Peripheral Intravenous Line            Peripheral IV 02/09/18 Left Antecubital 1 day                Physical Exam   Constitutional: She is oriented to person, place, and time  She appears well-developed  She is cooperative  HENT:   Head: Normocephalic and atraumatic  Eyes: Conjunctivae and EOM are normal  Pupils are equal, round, and reactive to light  Neck: Trachea normal  Neck supple  Cardiovascular: Regular rhythm and normal heart sounds  Pulmonary/Chest: Effort normal and breath sounds normal    Abdominal: Soft  She exhibits no distension  There is no tenderness  Musculoskeletal: Normal range of motion     Neurological: She is alert and oriented to person, place, and time  Skin: Skin is warm and dry  Right posterior upper thigh there is a large area of cellulitis and induration  It appears improved as it is now inside the black line  There is an area on the posterior thigh with a small purulent head and fluctuance   Psychiatric: She has a normal mood and affect  Her behavior is normal        Lab Results: I have personally reviewed pertinent lab results  Imaging: I have personally reviewed pertinent reports  EKG, Pathology, and Other Studies: I have personally reviewed pertinent reports

## 2018-02-11 NOTE — ASSESSMENT & PLAN NOTE
With possible small evolving abscess posteriorly  Consider MRSA given prior history  Appears to be improving based on previously outline border and description    · Continue vancomycin as above  · Await surgical consultation for possible I and D  · Continue with serial exams

## 2018-02-11 NOTE — ASSESSMENT & PLAN NOTE
Due to thigh cellulitis  Blood cultures negative  Clinically improving    · Continue vancomycin for now  · Check vancomycin trough with next dose  · Follow-up final blood cultures from admission  · Follow temperatures closely  · Check CBC in a m    · Continue supportive care as per the primary service

## 2018-02-11 NOTE — CONSULTS
Consultation - Infectious Disease   Francy Sarabia 27 y o  female MRN: [id number]  Unit/Bed#: E5 -01 Encounter: [id number]      IMPRESSION & RECOMMENDATIONS:   * Sepsis affecting skin ([facility] [address] )   Assessment & Plan    Due to thigh cellulitis  Blood cultures negative  Clinically improving    · Continue vancomycin for now  · Check vancomycin trough with next dose  · Follow-up final blood cultures from admission  · Follow temperatures closely  · Check CBC in a m  · Continue supportive care as per the primary service        Cellulitis of right lower extremity   Assessment & Plan    With possible small evolving abscess posteriorly  Consider MRSA given prior history  Appears to be improving based on previously outline border and description    · Continue vancomycin as above  · Await surgical consultation for possible I and D  · Continue with serial exams        Type 1 diabetes mellitus with hyperglycemia ([facility] [address] )   Assessment & Plan    Poorly controlled due to noncompliance in setting of IVDU, homelessness  A1c 9 2  Risk factor for infection    · Continue management as per the primary service        Heroin abuse   Assessment & Plan    Risk factor for future infection  Fortunately blood cultures remain negative    · Needs case management consultation for homelessness and rehab          Antibiotics:  Vancomycin # 2    Thank you for this consultation  We will follow along with you  HISTORY OF PRESENT ILLNESS:  Reason for Consult: Right thigh abscess    HPI: Francy Sarabia is a 27 y o  female with a history of type 1 diabetes on long-term insulin, hepatitis-C, and active IV heroin use  She injects in her right neck  She is from [place] and currently living in a homeless shelter with her boyfriend  Patient was recently admitted to the hospital in late January for influenza  She more recently presented to the emergency department on 02/09 for a cellulitis with abscess on her right thigh    Upon presentation she had a low-grade fever of 100 4 with tachycardia and leukocytosis  She was started empirically on vancomycin  The patient states she has had MRSA previously with skin boils  We are asked to comment on further evaluation and management  REVIEW OF SYSTEMS:  Complains of feeling unwell with headache and nausea  Complains of pain in right groin at site of infection  A complete system-based review of systems is otherwise negative  PAST MEDICAL HISTORY:  Past Medical History:   Diagnosis Date    Diabetes mellitus ([address] )     GERD (gastroesophageal reflux disease)     Hepatitis C     Hypertension      Past Surgical History:   Procedure Laterality Date    BACK SURGERY      lumbar     EYE SURGERY      bilateral       FAMILY HISTORY:  Non-contributory    SOCIAL HISTORY:  History   Alcohol Use No     History   Drug Use    Types: Heroin     Comment: Antonieta/MDMA history of this      History   Smoking Status    Light Tobacco Smoker   Smokeless Tobacco    Never Used       ALLERGIES:  Allergies   Allergen Reactions    Amoxicillin Rash    Vicodin [Hydrocodone-Acetaminophen] Abdominal Pain       MEDICATIONS:  All current active medications have been reviewed  PHYSICAL EXAM:  Vitals:  HR:  [101-107] 107  Resp:  [18-20] 18  BP: (108-110)/(57-65) 110/58  SpO2:  [90 %-92 %] 90 %  Temp (24hrs), Av 5 °F (36 9 °C), Min:97 9 °F (36 6 °C), Max:99 1 °F (37 3 °C)  Current: Temperature: 98 3 °F (36 8 °C)     Physical Exam:  General:  Well-nourished, well-developed, in no acute distress, slightly disheveled  Eyes:  Conjunctive clear with no hemorrhages or effusions  Oropharynx:  No ulcers, no lesions  Neck:  Supple, no lymphadenopathy  Injection marks noted right neck    No cellulitis or fluctuance  Lungs:  Clear to auscultation bilaterally, no accessory muscle use  Cardiac:  Tachycardic with a regular rhythm, no murmurs  Abdomen:  Soft, non-tender, non-distended  Extremities:  No peripheral cyanosis, clubbing, or edema  Skin:  No rashes, no ulcers  Neurological:  Moves all four extremities spontaneously, sensation grossly intact  Right thigh:  Faint erythema which seems to be retracting from previously outline border  Small pustule with possible fluctuance over posterior thigh  Tenderness seems out of proportion to clinical findings  LABS, IMAGING, & OTHER STUDIES:  Lab Results:  I have personally reviewed pertinent labs  Results from last 7 days  Lab Units 02/10/18  0621 02/09/18  2306   SODIUM mmol/L 136 138   POTASSIUM mmol/L 3 9 3 8   CHLORIDE mmol/L 102 100   CO2 mmol/L 25 28   ANION GAP mmol/L 9 10   BUN mg/dL 11 11   CREATININE mg/dL 0 72 0 68   EGFR ml/min/1 73sq m 110 114   GLUCOSE RANDOM mg/dL 243* 97   CALCIUM mg/dL 8 7 9 4   AST U/L  --  13   ALT U/L  --  13   ALK PHOS U/L  --  145*   TOTAL PROTEIN g/dL  --  8 2   BILIRUBIN TOTAL mg/dL  --  0 48       Results from last 7 days  Lab Units 02/10/18  0621 02/09/18  2306   WBC Thousand/uL 27 63* 33 14*   HEMOGLOBIN g/dL 8 7* 9 5*   PLATELETS Thousands/uL 356 426*           Imaging Studies:   I have personally reviewed pertinent imaging study reports and images in PACS  EKG, Pathology, and Other Studies:   I have personally reviewed pertinent reports

## 2018-02-11 NOTE — ASSESSMENT & PLAN NOTE
Risk factor for future infection  Fortunately blood cultures remain negative    · Needs case management consultation for homelessness and rehab

## 2018-02-11 NOTE — PROGRESS NOTES
[facility] Progress Note  Patient: Mayuri Guerrero 27 y o  female   MRN: [id number]  PCP: No primary care provider on file  Unit/Bed#: E5 -01 Encounter: [id number]  Date Of Visit: 02/11/18    Assessment and plan:    Principal Problem:    Sepsis due to cellulitis (HCC)-likely related to known heroin abuse and poor self-care  Suspected MRSA  Continue vancomycin with dose adjustments per ID  Active Problems:    Cellulitis and abscess of right lower extremity-status post right posterior thigh incision and drainage of abscess  Continue daily dressing change and as needed  Follow up culture results  On empiric vancomycin    Type 1 diabetes mellitus with hyperglycemia (HCC)-with poor compliance due to homelessness and drug addiction  Continue Lantus 50 units at night with Humalog sliding scale  Heroin abuse-I advised the patient to take better care of herself  Consult case management  Refer to host program     GERD (gastroesophageal reflux disease)-continue Protonix daily  Homelessness-consult case management  The patient lives in [place]  She plans to move back there when she is able to afford the trip back          VTE Pharmacologic Prophylaxis:   Pharmacologic: Enoxaparin (Lovenox)  Mechanical VTE Prophylaxis in Place: No    Discussions with Specialists or Other Care Team Provider:  History and physical reviewed    Time Spent for Care: 20 minutes  More than 50% of total time spent on counseling and coordination of care as described above  Current Length of Stay: 2 day(s)    Current Patient Status: Inpatient   Certification Statement: The patient will continue to require additional inpatient hospital stay due to Thigh abscess and sepsis    Discharge Plan:  Case management consultation regarding discharge plan    Code Status: Level 1 - Full Code      Subjective: The patient has no fever or chills  She has pain from the right incision site status post drainage  Objective:     Vitals:   Temp (24hrs), Av 6 °F (37 °C), Min:98 1 °F (36 7 °C), Max:99 1 °F (37 3 °C)    HR:  [104-110] 110  Resp:  [18-20] 18  BP: (103-110)/(56-58) 103/56  SpO2:  [90 %-92 %] 90 %  Body mass index is 25 06 kg/m²  Input and Output Summary (last 24 hours):     No intake or output data in the 24 hours ending 18 1653    Physical Exam:     Physical Exam   Constitutional:   Looks older than stated age   HENT:   Mouth/Throat: No oropharyngeal exudate  Eyes: No scleral icterus  Neck: No JVD present  Cardiovascular: Regular rhythm  Exam reveals no friction rub  No murmur heard  Pulmonary/Chest: Effort normal  No respiratory distress  She has no wheezes  Abdominal: Soft  She exhibits no distension  There is no tenderness  Musculoskeletal:        Legs:      Additional Data:     Labs:      Results from last 7 days  Lab Units 02/10/18  0621 18  2306   WBC Thousand/uL 27 63* 33 14*   HEMOGLOBIN g/dL 8 7* 9 5*   HEMATOCRIT % 28 6* 30 9*   PLATELETS Thousands/uL 356 426*   NEUTROS PCT %  --  89*   LYMPHS PCT %  --  6*   MONOS PCT %  --  5   EOS PCT %  --  0       Results from last 7 days  Lab Units 02/10/18  0621 18  2306   SODIUM mmol/L 136 138   POTASSIUM mmol/L 3 9 3 8   CHLORIDE mmol/L 102 100   CO2 mmol/L 25 28   BUN mg/dL 11 11   CREATININE mg/dL 0 72 0 68   CALCIUM mg/dL 8 7 9 4   TOTAL PROTEIN g/dL  --  8 2   BILIRUBIN TOTAL mg/dL  --  0 48   ALK PHOS U/L  --  145*   ALT U/L  --  13   AST U/L  --  13   GLUCOSE RANDOM mg/dL 243* 97           * I Have Reviewed All Lab Data Listed Above  * Additional Pertinent Lab Tests Reviewed: All Labs Within Last 24 Hours Reviewed    Imaging:    Imaging Reports Reviewed Today Include:  None      Recent Cultures (last 7 days):       Results from last 7 days  Lab Units 02/10/18  0935 02/10/18  0933   BLOOD CULTURE  No Growth at 24 hrs  No Growth at 24 hrs         Last 24 Hours Medication List:     Current Facility-Administered Medications:  acetaminophen 650 mg Oral Q6H PRN Sammy Leiva PA-C    aluminum-magnesium hydroxide-simethicone 30 mL Oral Q6H PRN Sammy Leiva, GIRISH    amLODIPine 10 mg Oral Daily Sammy Leiva, PA-KIRSTY    enoxaparin 40 mg Subcutaneous Daily Sammy Leiva, PA-KIRSTY    gabapentin 100 mg Oral TID Sammy Leiva, PA-KIRSTY    insulin glargine 50 Units Subcutaneous Daily Sammy Leiva, PA-C    insulin lispro 1-5 Units Subcutaneous TID AC Sammy Leiva, PA-C    insulin lispro 1-5 Units Subcutaneous HS Sammy Leiva, GIRISH    ketorolac 30 mg Intravenous Q6H PRN Lou Rocha MD    lidocaine (PF) 10 mL Infiltration Once Amber Helm MD    LORazepam 1 mg Intravenous Q4H PRN Sammy Leiva PA-C    nicotine 1 patch Transdermal Daily Sammy Leiva PA-C    ondansetron 4 mg Intravenous Q4H PRN Lou Rocha MD    pantoprazole 40 mg Oral Early Morning Sammy Leiva PA-C    sodium chloride 125 mL/hr Intravenous Continuous Sammy Leiva PA-C Last Rate: 125 mL/hr (02/11/18 1212)   vancomycin 15 mg/kg Intravenous Q12H Sammy Leiva PA-C Last Rate: 1,000 mg (02/11/18 1213)   venlafaxine 37 5 mg Oral BID Sammy Leiva PA-C         Today, Patient Was Seen By: Lou Rocha MD    ** Please Note: Dragon 360 Dictation voice to text software may have been used in the creation of this document   **

## 2018-02-12 ENCOUNTER — APPOINTMENT (INPATIENT)
Dept: RADIOLOGY | Facility: HOSPITAL | Age: 33
DRG: 853 | End: 2018-02-12
Payer: MEDICARE

## 2018-02-12 ENCOUNTER — APPOINTMENT (INPATIENT)
Dept: CT IMAGING | Facility: HOSPITAL | Age: 33
DRG: 853 | End: 2018-02-12
Payer: MEDICARE

## 2018-02-12 LAB
ANION GAP SERPL CALCULATED.3IONS-SCNC: 9 MMOL/L (ref 4–13)
ARTERIAL PATENCY WRIST A: YES
BASE EXCESS BLDA CALC-SCNC: 0.7 MMOL/L
BUN SERPL-MCNC: 7 MG/DL (ref 5–25)
CALCIUM SERPL-MCNC: 8.4 MG/DL (ref 8.3–10.1)
CHLORIDE SERPL-SCNC: 106 MMOL/L (ref 100–108)
CO2 SERPL-SCNC: 26 MMOL/L (ref 21–32)
CREAT SERPL-MCNC: 0.68 MG/DL (ref 0.6–1.3)
ERYTHROCYTE [DISTWIDTH] IN BLOOD BY AUTOMATED COUNT: 16.6 % (ref 11.6–15.1)
GFR SERPL CREATININE-BSD FRML MDRD: 114 ML/MIN/1.73SQ M
GLUCOSE SERPL-MCNC: 172 MG/DL (ref 65–140)
GLUCOSE SERPL-MCNC: 199 MG/DL (ref 65–140)
GLUCOSE SERPL-MCNC: 221 MG/DL (ref 65–140)
GLUCOSE SERPL-MCNC: 46 MG/DL (ref 65–140)
GLUCOSE SERPL-MCNC: 99 MG/DL (ref 65–140)
GLUCOSE SERPL-MCNC: 99 MG/DL (ref 65–140)
HCO3 BLDA-SCNC: 25.5 MMOL/L (ref 22–28)
HCT VFR BLD AUTO: 26.6 % (ref 34.8–46.1)
HGB BLD-MCNC: 8.1 G/DL (ref 11.5–15.4)
MCH RBC QN AUTO: 26.8 PG (ref 26.8–34.3)
MCHC RBC AUTO-ENTMCNC: 30.5 G/DL (ref 31.4–37.4)
MCV RBC AUTO: 88 FL (ref 82–98)
NASAL CANNULA: 6
O2 CT BLDA-SCNC: 9.4 ML/DL (ref 16–23)
OXYHGB MFR BLDA: 81.6 % (ref 94–97)
PCO2 BLDA: 41.5 MM HG (ref 36–44)
PH BLDA: 7.41 [PH] (ref 7.35–7.45)
PLATELET # BLD AUTO: 399 THOUSANDS/UL (ref 149–390)
PMV BLD AUTO: 9.5 FL (ref 8.9–12.7)
PO2 BLDA: 44.4 MM HG (ref 75–129)
POTASSIUM SERPL-SCNC: 3.6 MMOL/L (ref 3.5–5.3)
RBC # BLD AUTO: 3.02 MILLION/UL (ref 3.81–5.12)
SODIUM SERPL-SCNC: 141 MMOL/L (ref 136–145)
SPECIMEN SOURCE: ABNORMAL
WBC # BLD AUTO: 21.62 THOUSAND/UL (ref 4.31–10.16)

## 2018-02-12 PROCEDURE — 36600 WITHDRAWAL OF ARTERIAL BLOOD: CPT

## 2018-02-12 PROCEDURE — 80048 BASIC METABOLIC PNL TOTAL CA: CPT | Performed by: INTERNAL MEDICINE

## 2018-02-12 PROCEDURE — 85027 COMPLETE CBC AUTOMATED: CPT | Performed by: INTERNAL MEDICINE

## 2018-02-12 PROCEDURE — 94640 AIRWAY INHALATION TREATMENT: CPT

## 2018-02-12 PROCEDURE — 82805 BLOOD GASES W/O2 SATURATION: CPT | Performed by: INTERNAL MEDICINE

## 2018-02-12 PROCEDURE — 94760 N-INVAS EAR/PLS OXIMETRY 1: CPT

## 2018-02-12 PROCEDURE — 71046 X-RAY EXAM CHEST 2 VIEWS: CPT

## 2018-02-12 PROCEDURE — 71275 CT ANGIOGRAPHY CHEST: CPT

## 2018-02-12 PROCEDURE — 99232 SBSQ HOSP IP/OBS MODERATE 35: CPT | Performed by: INTERNAL MEDICINE

## 2018-02-12 PROCEDURE — 82948 REAGENT STRIP/BLOOD GLUCOSE: CPT

## 2018-02-12 RX ORDER — FUROSEMIDE 10 MG/ML
20 INJECTION INTRAMUSCULAR; INTRAVENOUS ONCE
Status: COMPLETED | OUTPATIENT
Start: 2018-02-12 | End: 2018-02-12

## 2018-02-12 RX ORDER — LEVALBUTEROL 1.25 MG/.5ML
1.25 SOLUTION, CONCENTRATE RESPIRATORY (INHALATION) EVERY 8 HOURS PRN
Status: DISCONTINUED | OUTPATIENT
Start: 2018-02-12 | End: 2018-02-15

## 2018-02-12 RX ORDER — DEXTROSE MONOHYDRATE 25 G/50ML
INJECTION, SOLUTION INTRAVENOUS
Status: COMPLETED
Start: 2018-02-12 | End: 2018-02-12

## 2018-02-12 RX ORDER — VANCOMYCIN HYDROCHLORIDE 1 G/200ML
15 INJECTION, SOLUTION INTRAVENOUS EVERY 8 HOURS
Status: DISCONTINUED | OUTPATIENT
Start: 2018-02-12 | End: 2018-02-15

## 2018-02-12 RX ORDER — INSULIN GLARGINE 100 [IU]/ML
30 INJECTION, SOLUTION SUBCUTANEOUS DAILY
Status: DISCONTINUED | OUTPATIENT
Start: 2018-02-13 | End: 2018-02-13

## 2018-02-12 RX ADMIN — KETOROLAC TROMETHAMINE 30 MG: 30 INJECTION, SOLUTION INTRAMUSCULAR at 18:09

## 2018-02-12 RX ADMIN — VANCOMYCIN HYDROCHLORIDE 1000 MG: 1 INJECTION, SOLUTION INTRAVENOUS at 10:02

## 2018-02-12 RX ADMIN — VENLAFAXINE 37.5 MG: 37.5 TABLET ORAL at 08:55

## 2018-02-12 RX ADMIN — ACETAMINOPHEN 650 MG: 325 TABLET, FILM COATED ORAL at 16:27

## 2018-02-12 RX ADMIN — VENLAFAXINE 37.5 MG: 37.5 TABLET ORAL at 18:16

## 2018-02-12 RX ADMIN — DEXTROSE MONOHYDRATE 50 ML: 25 INJECTION, SOLUTION INTRAVENOUS at 11:47

## 2018-02-12 RX ADMIN — ACETAMINOPHEN 650 MG: 325 TABLET, FILM COATED ORAL at 22:09

## 2018-02-12 RX ADMIN — ACETAMINOPHEN 650 MG: 325 TABLET, FILM COATED ORAL at 08:55

## 2018-02-12 RX ADMIN — PANTOPRAZOLE SODIUM 40 MG: 40 TABLET, DELAYED RELEASE ORAL at 05:11

## 2018-02-12 RX ADMIN — SODIUM CHLORIDE 125 ML/HR: 0.9 INJECTION, SOLUTION INTRAVENOUS at 05:11

## 2018-02-12 RX ADMIN — ONDANSETRON 4 MG: 2 INJECTION INTRAMUSCULAR; INTRAVENOUS at 16:36

## 2018-02-12 RX ADMIN — INSULIN LISPRO 1 UNITS: 100 INJECTION, SOLUTION INTRAVENOUS; SUBCUTANEOUS at 16:33

## 2018-02-12 RX ADMIN — NICOTINE 1 PATCH: 14 PATCH TRANSDERMAL at 08:55

## 2018-02-12 RX ADMIN — ENOXAPARIN SODIUM 40 MG: 40 INJECTION SUBCUTANEOUS at 08:55

## 2018-02-12 RX ADMIN — GABAPENTIN 100 MG: 100 CAPSULE ORAL at 22:09

## 2018-02-12 RX ADMIN — KETOROLAC TROMETHAMINE 30 MG: 30 INJECTION, SOLUTION INTRAMUSCULAR at 05:11

## 2018-02-12 RX ADMIN — INSULIN LISPRO 1 UNITS: 100 INJECTION, SOLUTION INTRAVENOUS; SUBCUTANEOUS at 22:09

## 2018-02-12 RX ADMIN — LORAZEPAM 1 MG: 2 INJECTION INTRAMUSCULAR; INTRAVENOUS at 05:11

## 2018-02-12 RX ADMIN — LORAZEPAM 1 MG: 2 INJECTION INTRAMUSCULAR; INTRAVENOUS at 16:38

## 2018-02-12 RX ADMIN — INSULIN GLARGINE 50 UNITS: 100 INJECTION, SOLUTION SUBCUTANEOUS at 08:55

## 2018-02-12 RX ADMIN — IOHEXOL 85 ML: 350 INJECTION, SOLUTION INTRAVENOUS at 20:59

## 2018-02-12 RX ADMIN — LORAZEPAM 1 MG: 2 INJECTION INTRAMUSCULAR; INTRAVENOUS at 23:45

## 2018-02-12 RX ADMIN — LEVALBUTEROL HYDROCHLORIDE 1.25 MG: 1.25 SOLUTION, CONCENTRATE RESPIRATORY (INHALATION) at 16:59

## 2018-02-12 RX ADMIN — LORAZEPAM 1 MG: 2 INJECTION INTRAMUSCULAR; INTRAVENOUS at 11:47

## 2018-02-12 RX ADMIN — FUROSEMIDE 20 MG: 10 INJECTION, SOLUTION INTRAMUSCULAR; INTRAVENOUS at 18:08

## 2018-02-12 RX ADMIN — VANCOMYCIN HYDROCHLORIDE 1000 MG: 1 INJECTION, SOLUTION INTRAVENOUS at 16:28

## 2018-02-12 RX ADMIN — KETOROLAC TROMETHAMINE 30 MG: 30 INJECTION, SOLUTION INTRAMUSCULAR at 11:47

## 2018-02-12 RX ADMIN — GABAPENTIN 100 MG: 100 CAPSULE ORAL at 16:27

## 2018-02-12 RX ADMIN — GABAPENTIN 100 MG: 100 CAPSULE ORAL at 08:55

## 2018-02-12 NOTE — PROGRESS NOTES
[facility] Progress Note  Patient: Michael Carreon 27 y o  female   MRN: [id number]  PCP: No primary care provider on file  Unit/Bed#: E5 -01 Encounter: [id number]  Date Of Visit: 02/12/18  Addendum: pt still hypoxic but appears very comfortable  Question if true value  Will obtain abg and determine further treatment from this  Assessment/plan  Principal Problem: 1  Sepsis due to cellulitis and absess of right lower ext-likely related to known heroin abuse and poor self-care  Suspected MRSA  Continue vancomycin with dose adjustments per ID  S/p I and D  Awaiting culture results  Blood culture negative so far  2  Acute hypoxic respiratory failure- d/c IVf  Check cxr  Add bronchodilator  May need to obtain Ct chest if cxr is normal  Will dose IV lasix x1 now  Active Problems:  1  Type 1 diabetes mellitus with hyperglycemia-with poor compliance due to homelessness and drug addiction  pt was hypoglycemic with 50 units of lantus  Will decrease to 30 units  Will continue to monitor    2  Heroin abuse-  Refer to host program  Monitor for withdrawal    3  GERD-continue Protonix daily    4  Homelessness-The patient lives in [place]  She plans to move back there when she is able to afford the trip back    Subjective:   Pt seen and examined  Pt reports that she is very short of breath at times  She states just walking to the bathroom makes her short of breath  She is using oxygen which is new to her  She is not coughing  She has been on IVF  No f/c no cp no n/v/d no abd pain    Objective:     Vitals: Blood pressure 109/59, pulse 105, temperature 98 8 °F (37 1 °C), temperature source Temporal, resp  rate 18, height 5' 7" (1 702 m), weight 72 6 kg (160 lb), last menstrual period 01/18/2018, SpO2 92 %  ,Body mass index is 25 06 kg/m²      Lab, Imaging and other studies:    Results from last 7 days  Lab Units 02/12/18  0651   WBC Thousand/uL 21 62*   HEMOGLOBIN g/dL 8 1* HEMATOCRIT % 26 6*   PLATELETS Thousands/uL 399*       Results from last 7 days  Lab Units 02/12/18  0651  02/09/18  2306   SODIUM mmol/L 141  < > 138   POTASSIUM mmol/L 3 6  < > 3 8   CHLORIDE mmol/L 106  < > 100   CO2 mmol/L 26  < > 28   BUN mg/dL 7  < > 11   CREATININE mg/dL 0 68  < > 0 68   CALCIUM mg/dL 8 4  < > 9 4   TOTAL PROTEIN g/dL  --   --  8 2   BILIRUBIN TOTAL mg/dL  --   --  0 48   ALK PHOS U/L  --   --  145*   ALT U/L  --   --  13   AST U/L  --   --  13   GLUCOSE RANDOM mg/dL 99  < > 97   < > = values in this interval not displayed  Lab Results   Component Value Date    BLOODCX No Growth at 48 hrs  02/10/2018    BLOODCX No Growth at 48 hrs  02/10/2018    BLOODCX No Growth After 5 Days  01/27/2018    WOUNDCULT 3+ Growth of Staphylococcus aureus (A) 02/11/2018         Xr Chest Portable    Result Date: 1/28/2018  Narrative: CHEST INDICATION:  Fever COMPARISON:  None VIEWS:   AP frontal IMAGES:  1 FINDINGS:     Cardiomediastinal silhouette appears unremarkable  The lungs are clear  No pneumothorax or pleural effusion  Visualized osseous structures appear within normal limits for the patient's age  Impression: No active pulmonary disease   Workstation performed: [id number]       Scheduled Meds:   Current Facility-Administered Medications:  acetaminophen 650 mg Oral Q6H PRN Lollie Crass, PA-C    aluminum-magnesium hydroxide-simethicone 30 mL Oral Q6H PRN Lollie Crass, PA-C    amLODIPine 10 mg Oral Daily Lollie Crass, PA-C    enoxaparin 40 mg Subcutaneous Daily Lollie Crass, PA-C    gabapentin 100 mg Oral TID Radha Naranjo PA-C    [START ON 2/13/2018] insulin glargine 30 Units Subcutaneous Daily Moriah Choi, DO    insulin lispro 1-5 Units Subcutaneous TID AC RAYO Michaud-KIRSTY    insulin lispro 1-5 Units Subcutaneous HS Radha Naranjo, PA-KIRSTY    ketorolac 30 mg Intravenous Q6H PRN Tim Ruby MD    levalbuterol 1 25 mg Nebulization Q8H PRN Moriah Ambron, DO    lidocaine (PF) 10 mL Infiltration Once Lela Medley MD    LORazepam 1 mg Intravenous Q4H PRN Otf Cole PA-C    nicotine 1 patch Transdermal Daily Otf Cole PA-C    ondansetron 4 mg Intravenous Q4H PRN Rcaquel Tang MD    pantoprazole 40 mg Oral Early Morning Otf Cole PA-C    vancomycin 15 mg/kg Intravenous Q8H Robert Perea MD Last Rate: 1,000 mg (02/12/18 1002)   venlafaxine 37 5 mg Oral BID Otf Cole PA-C      Continuous Infusions:    PRN Meds:   acetaminophen    aluminum-magnesium hydroxide-simethicone    ketorolac    levalbuterol    LORazepam    ondansetron      Physical exam:  Physical Exam  General appearance: alert and oriented, in no acute distress  Head: Normocephalic, without obvious abnormality, atraumatic  Eyes: conjunctivae/corneas clear  PERRL, EOM's intact  Fundi benign    Neck: no adenopathy, no carotid bruit, no JVD, supple, symmetrical, trachea midline and thyroid not enlarged, symmetric, no tenderness/mass/nodules  Lungs: minimal wheezing  Heart: tachy s1 s2  Abdomen: soft, non-tender; bowel sounds normal; no masses,  no organomegaly  Extremities: minimal erythema  Pulses: 2+ and symmetric  Skin: dressing clean dry intact with minimal surrounding erythema  Neurologic: Grossly normal      VTE Pharmacologic Prophylaxis: Enoxaparin (Lovenox)  VTE Mechanical Prophylaxis: sequential compression device    Counseling / Coordination of Care  Total floor / unit time spent today 20 minutes     Current Length of Stay: 3 day(s)    Current Patient Status: Inpatient       Code Status: Level 1 - Full Code

## 2018-02-12 NOTE — CASE MANAGEMENT
Initial Clinical Review    Admission: Date/Time/Statement: 2/9/18 @ 2359     Orders Placed This Encounter   Procedures    Inpatient Admission (expected length of stay for this patient is greater than two midnights)     Standing Status:   Standing     Number of Occurrences:   1     Order Specific Question:   Admitting Physician     Answer:   ELVA MARTINO [[id number]]     Order Specific Question:   Level of Care     Answer:   Med Surg [[id number]]     Order Specific Question:   Estimated length of stay     Answer:   More than 2 Midnights     Order Specific Question:   Certification     Answer:   I certify that inpatient services are medically necessary for this patient for a duration of greater than two midnights  See H&P and MD Progress Notes for additional information about the patient's course of treatment  ED: Date/Time/Mode of Arrival:   ED Arrival Information     Expected Arrival Acuity Means of Arrival Escorted By Service Admission Type    - 2/9/2018 21:38 Urgent Ambulance [facility] General Medicine Urgent    Arrival Complaint    Abcess          Chief Complaint:   Chief Complaint   Patient presents with    Abscess     Patient reports abscess to back of R thigh for the last 3-4 days  Patient reports subjective fevers and nausea  Patient is diabetic who does not check her sugars and "just gives myself insulin " Patient IV heroin user who last used at 1500 today  History of Illness:    Francy Sarabia is a 27 y o  female who presents with redness and swelling posterior aspect of the right thigh  She admits to daily heroin use  She has a history of abscesses and MRSA  She is currently homeless and living in a shelter  She states that the swelling and redness started 3-4 days ago  She denies injecting there  She had fever and nausea today       ED Vital Signs:   ED Triage Vitals [02/09/18 2148]   Temperature Pulse Respirations Blood Pressure SpO2   100 4 °F (38 °C) (!) 123 18 131/66 95 %      Temp Source Heart Rate Source Patient Position - Orthostatic VS BP Location FiO2 (%)   Oral Monitor Lying Left arm --      Pain Score       Worst Possible Pain        Wt Readings from Last 1 Encounters:   02/10/18 72 6 kg (160 lb)       Vital Signs (abnormal):    above    Abnormal Labs/Diagnostic Test Results:    WBC    33 14  H/H  9 5/30 9  Platelets  028  Abs  neutro    29 58  Albumin   3 2    ED Treatment:   Medication Administration from 02/09/2018 2138 to 02/10/2018 0020       Date/Time Order Dose Route Action Action by Comments     02/09/2018 2319 sodium chloride 0 9 % bolus 1,000 mL 1,000 mL Intravenous [address] Geraldo Rivas RN      02/09/2018 2319 vancomycin (VANCOCIN) IVPB (premix) 1,000 mg 1,000 mg Intravenous [address] Geraldo Rivas RN      02/10/2018 0010 acetaminophen (TYLENOL) tablet 650 mg 650 mg Oral Given Geraldo Rivas RN           Past Medical/Surgical History: Active Ambulatory Problems     Diagnosis Date Noted    Hypertension     Hepatitis C     GERD (gastroesophageal reflux disease)     Diabetic ketoacidosis associated with type 1 diabetes mellitus ([facility] ) 01/26/2018    Hyperglycemia 01/26/2018    Heroin abuse 01/26/2018    Sepsis due to cellulitis ([place] [address] ) 01/27/2018    Influenza A 01/28/2018     Resolved Ambulatory Problems     Diagnosis Date Noted    No Resolved Ambulatory Problems     Past Medical History:   Diagnosis Date    Diabetes mellitus ([place] [address] )     GERD (gastroesophageal reflux disease)     Hepatitis C     Hypertension        Admitting Diagnosis: Abscess [L02 91]  Cellulitis [L03 90]  Sepsis ([place] [address] ) [A41 9]    Age/Sex: 27 y o  female    · Assessment/Plan:    SIRS/cellulitis right thigh  ? Admit to med/surg  Continue IV vanco  Has history of MRSA and had had abscesses in the past  Check blood cultures  Of note, patient is homeless and is currently staying in a local shelter   She will soon run out of her home medications       Plan for Additional Problems:   · HTN- continue norvasc  · Hep C  · GERD- continue protonix  · Heroin abuse- admits to daily use  No withdrawal signs at this time  Will order ativan  Avoid narcotics  Will not be able to send home with IV access  · DM- has hx of admission for DKA  She uses her insulin but does not check her sugars because she does not have a meter  Will continue lantus dose and order accuchecks with sliding scale     VTE Prophylaxis: Enoxaparin (Lovenox)  / sequential compression device   Code Status: full code  POLST: There is no POLST form on file for this patient (pre-hospital)     Anticipated Length of Stay:  Patient will be admitted on an Inpatient basis with an anticipated length of stay of  Greater than 2 midnights     Justification for Hospital Stay: patient requires IV antibiotics    Admission Orders:    IP    2/9  @     7326  Scheduled Meds:   Current Facility-Administered Medications:  acetaminophen 650 mg Oral Q6H PRN RAYO Watters-KIRSTY    aluminum-magnesium hydroxide-simethicone 30 mL Oral Q6H PRN Drwe Ramires PA-C    amLODIPine 10 mg Oral Daily Drew Ramires PA-C    enoxaparin 40 mg Subcutaneous Daily Drew Ramires PA-C    gabapentin 100 mg Oral TID Drew Ramires PA-C    [START ON 2/13/2018] insulin glargine 30 Units Subcutaneous Daily Moriah Choi DO    insulin lispro 1-5 Units Subcutaneous TID AC Jessica Egan PA-C    insulin lispro 1-5 Units Subcutaneous HS Drew Ramires PA-C    ketorolac 30 mg Intravenous Q6H PRN Lee Romero MD    lidocaine (PF) 10 mL Infiltration Once Jv Hull MD    LORazepam 1 mg Intravenous Q4H PRN Drew Ramires PA-C    nicotine 1 patch Transdermal Daily Drew Ramires PA-C    ondansetron 4 mg Intravenous Q4H PRN Lee Romero MD    pantoprazole 40 mg Oral Early Morning Drew Ramires PA-C    sodium chloride 125 mL/hr Intravenous Continuous Drew Ramires PA-C Last Rate: 125 mL/hr (02/12/18 0511)   vancomycin 15 mg/kg Intravenous Q8H Nicho Estrella MD Last Rate: 1,000 mg (02/12/18 1002) venlafaxine 37 5 mg Oral BID Neida Mancini PA-C      Continuous Infusions:   sodium chloride 125 mL/hr Last Rate: 125 mL/hr (02/12/18 0511)     PRN Meds:   acetaminophen    aluminum-magnesium hydroxide-simethicone    ketorolac    LORazepam    ondansetron     Reg diet  Contact isolation  BC  Cons general surgery  Cons  ID  Wound  C/s  I & D   R  Thigh   2/11   bedside

## 2018-02-12 NOTE — PROGRESS NOTES
Progress Note - Infectious Disease   Chago Giron 27 y o  female MRN: [id number]  Unit/Bed#: E5 -01 Encounter: [id number]      Impression/Plan:  1  Sepsis-POA  Tachycardia and leukocytosis  Appears all secondary to a right thigh cellulitis with abscess formation  No other clear sources appreciated  Consideration for the possibility of bacteremia  The patient appears to be tolerating the antibiotics without difficulty  The vancomycin trough is low   -change the vancomycin dosing to 1 g IV q 8 hours  -follow-up blood cultures  -monitor CBC with diff and creatinine  -no additional ID workup for now    2  Right thigh cellulitis/abscess-suspect MRSA with the patient's previous history of MRSA related abscess formation  Patient has undergone incision and drainage and cultures have been sent  The leg appears to have improved   -antibiotics as above  -follow up wound cultures and adjust antibiotics as needed  -local wound care  -close surgical follow-up  -possibly to oral antibiotics the next 48 hours if the patient is not bacteremic    3  Heroin abuse-patient continues to actively inject heroin  Consideration for the possibility of viral complications  -check HIV screen and hepatitis panel  -case management evaluation for homelessness in rehab    4  Diabetes mellitus-type 1 with hyperglycemia    Antibiotics:  Vancomycin 3    Subjective:  Patient has no fever, chills, sweats; no nausea, vomiting, diarrhea; no cough, shortness of breath; decreased right thigh pain  No new symptoms  She is still requiring oxygen support nocturnal    Objective:  Vitals:  HR:  [105-110] 105  Resp:  [18] 18  BP: (103-117)/(56-61) 109/59  SpO2:  [90 %-92 %] 92 %  Temp (24hrs), Av 3 °F (36 8 °C), Min:98 1 °F (36 7 °C), Max:98 8 °F (37 1 °C)  Current: Temperature: 98 8 °F (37 1 °C)    Physical Exam:   General Appearance:  Alert, interactive, nontoxic, no acute distress  Throat: Oropharynx moist without lesions      Lungs: Decreased breath sounds bilaterally; no wheezes, rhonchi or rales; respirations unlabored   Heart:  RRR; no murmur, rub or gallop   Abdomen:   Soft, non-tender, non-distended, positive bowel sounds  Extremities: No clubbing, cyanosis or edema   Skin: No new rashes or lesions  Right posterior thigh status post I and D with packing in place  Very faint surrounding erythema but notable induration       Labs, Imaging, & Other studies:   All pertinent labs and imaging studies were personally reviewed    Results from last 7 days  Lab Units 02/12/18  0651 02/10/18  0621 02/09/18  2306   WBC Thousand/uL 21 62* 27 63* 33 14*   HEMOGLOBIN g/dL 8 1* 8 7* 9 5*   PLATELETS Thousands/uL 399* 356 426*       Results from last 7 days  Lab Units 02/12/18  0651 02/10/18  0621 02/09/18  2306   SODIUM mmol/L 141 136 138   POTASSIUM mmol/L 3 6 3 9 3 8   CHLORIDE mmol/L 106 102 100   CO2 mmol/L 26 25 28   ANION GAP mmol/L 9 9 10   BUN mg/dL 7 11 11   CREATININE mg/dL 0 68 0 72 0 68   EGFR ml/min/1 73sq m 114 110 114   GLUCOSE RANDOM mg/dL 99 243* 97   CALCIUM mg/dL 8 4 8 7 9 4   AST U/L  --   --  13   ALT U/L  --   --  13   ALK PHOS U/L  --   --  145*   TOTAL PROTEIN g/dL  --   --  8 2   BILIRUBIN TOTAL mg/dL  --   --  0 48       Results from last 7 days  Lab Units 02/10/18  0935 02/10/18  0933   BLOOD CULTURE  No Growth at 24 hrs  No Growth at 24 hrs

## 2018-02-12 NOTE — POST OP PROGRESS NOTES
Progress Note - General Surgery   Zahra Tabares 27 y o  female MRN: [id number]  Unit/Bed#: E5 -01 Encounter: [id number]    Assessment:  Right thigh abscess with surrounding cellulitis  POD1 s/p bedside I+D, cultures pending  Sepsis POA- improving  Leukocytosis- trending down  H/o MRSA  Type 1 DM  Hep C  IV heroin abuse      Plan:  Continue to monitor wound  LWC with daily packing/dressing changes as ordered  Continue IV ABx  Await culture results  Continue to monitor leukocytosis  Pain control as needed  Continue supportive care  Continue medical management and BS control  Monitor for heroin withdrawl      Subjective/Objective   Chief Complaint: right thigh pain    Subjective: pain improved from yesterday but still with significant pain  Wound with copious drainage requiring dressing change overnight  Denies further fevers or chills  Elia diet  Objective:     Blood pressure 109/59, pulse 105, temperature 98 8 °F (37 1 °C), temperature source Temporal, resp  rate 18, height 5' 7" (1 702 m), weight 72 6 kg (160 lb), last menstrual period 01/18/2018, SpO2 92 %  ,Body mass index is 25 06 kg/m²      No intake or output data in the 24 hours ending 02/12/18 0914    Invasive Devices     Peripheral Intravenous Line            Peripheral IV 02/09/18 Left Antecubital 2 days                Physical Exam: General appearance: alert and oriented, mild distress  Neck: no carotid bruit, no JVD and supple, symmetrical, trachea midline  Lungs: clear to auscultation bilaterally  Heart: regular rate and rhythm, S1, S2 normal, no murmur, click, rub or gallop and regular rhythm, mild tachycardia, S1-S2 normal  Abdomen: soft, non-tender  Extremities: extremities normal, warm and well-perfused; no cyanosis, clubbing, or edema  Skin: Skin color, texture, turgor normal  No rashes or lesions or right thigh with medial cellulitis extending to marked edges, medial induration, appox 3 cm open wound with packing in place, copious serous drainage, wound area soft    Lab, Imaging and other studies:  I have personally reviewed pertinent lab results    , CBC:   Lab Results   Component Value Date    WBC 21 62 (H) 02/12/2018    HGB 8 1 (L) 02/12/2018    HCT 26 6 (L) 02/12/2018    MCV 88 02/12/2018     (H) 02/12/2018    MCH 26 8 02/12/2018    MCHC 30 5 (L) 02/12/2018    RDW 16 6 (H) 02/12/2018    MPV 9 5 02/12/2018   , CMP:   Lab Results   Component Value Date     02/12/2018    K 3 6 02/12/2018     02/12/2018    CO2 26 02/12/2018    ANIONGAP 9 02/12/2018    BUN 7 02/12/2018    CREATININE 0 68 02/12/2018    GLUCOSE 99 02/12/2018    CALCIUM 8 4 02/12/2018    EGFR 114 02/12/2018     VTE Pharmacologic Prophylaxis: Enoxaparin (Lovenox)  VTE Mechanical Prophylaxis: sequential compression device     Nidhi Goodwin PA-C

## 2018-02-13 LAB
ANION GAP SERPL CALCULATED.3IONS-SCNC: 3 MMOL/L (ref 4–13)
BACTERIA WND AEROBE CULT: ABNORMAL
BUN SERPL-MCNC: 5 MG/DL (ref 5–25)
CALCIUM SERPL-MCNC: 8.6 MG/DL (ref 8.3–10.1)
CHLORIDE SERPL-SCNC: 105 MMOL/L (ref 100–108)
CO2 SERPL-SCNC: 34 MMOL/L (ref 21–32)
CREAT SERPL-MCNC: 0.61 MG/DL (ref 0.6–1.3)
ERYTHROCYTE [DISTWIDTH] IN BLOOD BY AUTOMATED COUNT: 16.6 % (ref 11.6–15.1)
GFR SERPL CREATININE-BSD FRML MDRD: 119 ML/MIN/1.73SQ M
GLUCOSE SERPL-MCNC: 148 MG/DL (ref 65–140)
GLUCOSE SERPL-MCNC: 167 MG/DL (ref 65–140)
GLUCOSE SERPL-MCNC: 204 MG/DL (ref 65–140)
GLUCOSE SERPL-MCNC: 31 MG/DL (ref 65–140)
GLUCOSE SERPL-MCNC: 33 MG/DL (ref 65–140)
GLUCOSE SERPL-MCNC: 41 MG/DL (ref 65–140)
GLUCOSE SERPL-MCNC: 52 MG/DL (ref 65–140)
GLUCOSE SERPL-MCNC: 86 MG/DL (ref 65–140)
GRAM STN SPEC: ABNORMAL
GRAM STN SPEC: ABNORMAL
HCT VFR BLD AUTO: 26.8 % (ref 34.8–46.1)
HGB BLD-MCNC: 8.1 G/DL (ref 11.5–15.4)
MCH RBC QN AUTO: 26.6 PG (ref 26.8–34.3)
MCHC RBC AUTO-ENTMCNC: 30.2 G/DL (ref 31.4–37.4)
MCV RBC AUTO: 88 FL (ref 82–98)
NT-PROBNP SERPL-MCNC: 1290 PG/ML
PLATELET # BLD AUTO: 403 THOUSANDS/UL (ref 149–390)
PMV BLD AUTO: 9.2 FL (ref 8.9–12.7)
POTASSIUM SERPL-SCNC: 3.7 MMOL/L (ref 3.5–5.3)
RBC # BLD AUTO: 3.05 MILLION/UL (ref 3.81–5.12)
SODIUM SERPL-SCNC: 142 MMOL/L (ref 136–145)
WBC # BLD AUTO: 15.29 THOUSAND/UL (ref 4.31–10.16)

## 2018-02-13 PROCEDURE — 99233 SBSQ HOSP IP/OBS HIGH 50: CPT | Performed by: INTERNAL MEDICINE

## 2018-02-13 PROCEDURE — 94760 N-INVAS EAR/PLS OXIMETRY 1: CPT

## 2018-02-13 PROCEDURE — 99232 SBSQ HOSP IP/OBS MODERATE 35: CPT | Performed by: INTERNAL MEDICINE

## 2018-02-13 PROCEDURE — 94640 AIRWAY INHALATION TREATMENT: CPT

## 2018-02-13 PROCEDURE — 87340 HEPATITIS B SURFACE AG IA: CPT | Performed by: INTERNAL MEDICINE

## 2018-02-13 PROCEDURE — 82948 REAGENT STRIP/BLOOD GLUCOSE: CPT

## 2018-02-13 PROCEDURE — 86803 HEPATITIS C AB TEST: CPT | Performed by: INTERNAL MEDICINE

## 2018-02-13 PROCEDURE — 86704 HEP B CORE ANTIBODY TOTAL: CPT | Performed by: INTERNAL MEDICINE

## 2018-02-13 PROCEDURE — 85027 COMPLETE CBC AUTOMATED: CPT | Performed by: INTERNAL MEDICINE

## 2018-02-13 PROCEDURE — 87389 HIV-1 AG W/HIV-1&-2 AB AG IA: CPT | Performed by: INTERNAL MEDICINE

## 2018-02-13 PROCEDURE — 83880 ASSAY OF NATRIURETIC PEPTIDE: CPT | Performed by: INTERNAL MEDICINE

## 2018-02-13 PROCEDURE — 80048 BASIC METABOLIC PNL TOTAL CA: CPT | Performed by: INTERNAL MEDICINE

## 2018-02-13 PROCEDURE — 86705 HEP B CORE ANTIBODY IGM: CPT | Performed by: INTERNAL MEDICINE

## 2018-02-13 RX ORDER — SODIUM CHLORIDE FOR INHALATION 0.9 %
3 VIAL, NEBULIZER (ML) INHALATION EVERY 8 HOURS PRN
Status: DISCONTINUED | OUTPATIENT
Start: 2018-02-13 | End: 2018-02-15

## 2018-02-13 RX ORDER — INSULIN GLARGINE 100 [IU]/ML
15 INJECTION, SOLUTION SUBCUTANEOUS DAILY
Status: DISCONTINUED | OUTPATIENT
Start: 2018-02-14 | End: 2018-02-14

## 2018-02-13 RX ORDER — FUROSEMIDE 10 MG/ML
20 INJECTION INTRAMUSCULAR; INTRAVENOUS
Status: DISCONTINUED | OUTPATIENT
Start: 2018-02-13 | End: 2018-02-15

## 2018-02-13 RX ORDER — DEXTROSE MONOHYDRATE 25 G/50ML
INJECTION, SOLUTION INTRAVENOUS
Status: COMPLETED
Start: 2018-02-13 | End: 2018-02-13

## 2018-02-13 RX ORDER — SODIUM CHLORIDE FOR INHALATION 0.9 %
VIAL, NEBULIZER (ML) INHALATION
Status: DISPENSED
Start: 2018-02-13 | End: 2018-02-13

## 2018-02-13 RX ORDER — INSULIN GLARGINE 100 [IU]/ML
20 INJECTION, SOLUTION SUBCUTANEOUS DAILY
Status: DISCONTINUED | OUTPATIENT
Start: 2018-02-14 | End: 2018-02-13

## 2018-02-13 RX ADMIN — VANCOMYCIN HYDROCHLORIDE 1000 MG: 1 INJECTION, SOLUTION INTRAVENOUS at 17:26

## 2018-02-13 RX ADMIN — LORAZEPAM 1 MG: 2 INJECTION INTRAMUSCULAR; INTRAVENOUS at 09:13

## 2018-02-13 RX ADMIN — VENLAFAXINE 37.5 MG: 37.5 TABLET ORAL at 17:26

## 2018-02-13 RX ADMIN — ENOXAPARIN SODIUM 40 MG: 40 INJECTION SUBCUTANEOUS at 08:58

## 2018-02-13 RX ADMIN — DEXTROSE MONOHYDRATE: 500 INJECTION PARENTERAL at 19:48

## 2018-02-13 RX ADMIN — INSULIN LISPRO 1 UNITS: 100 INJECTION, SOLUTION INTRAVENOUS; SUBCUTANEOUS at 21:28

## 2018-02-13 RX ADMIN — GABAPENTIN 100 MG: 100 CAPSULE ORAL at 16:52

## 2018-02-13 RX ADMIN — INSULIN LISPRO 1 UNITS: 100 INJECTION, SOLUTION INTRAVENOUS; SUBCUTANEOUS at 11:55

## 2018-02-13 RX ADMIN — GABAPENTIN 100 MG: 100 CAPSULE ORAL at 21:28

## 2018-02-13 RX ADMIN — ACETAMINOPHEN 650 MG: 325 TABLET, FILM COATED ORAL at 16:51

## 2018-02-13 RX ADMIN — KETOROLAC TROMETHAMINE 30 MG: 30 INJECTION, SOLUTION INTRAMUSCULAR at 13:53

## 2018-02-13 RX ADMIN — KETOROLAC TROMETHAMINE 30 MG: 30 INJECTION, SOLUTION INTRAMUSCULAR at 08:57

## 2018-02-13 RX ADMIN — ISODIUM CHLORIDE 3 ML: 0.03 SOLUTION RESPIRATORY (INHALATION) at 10:10

## 2018-02-13 RX ADMIN — KETOROLAC TROMETHAMINE 30 MG: 30 INJECTION, SOLUTION INTRAMUSCULAR at 01:12

## 2018-02-13 RX ADMIN — LEVALBUTEROL HYDROCHLORIDE 1.25 MG: 1.25 SOLUTION, CONCENTRATE RESPIRATORY (INHALATION) at 10:09

## 2018-02-13 RX ADMIN — PANTOPRAZOLE SODIUM 40 MG: 40 TABLET, DELAYED RELEASE ORAL at 05:46

## 2018-02-13 RX ADMIN — VENLAFAXINE 37.5 MG: 37.5 TABLET ORAL at 09:13

## 2018-02-13 RX ADMIN — KETOROLAC TROMETHAMINE 30 MG: 30 INJECTION, SOLUTION INTRAMUSCULAR at 20:30

## 2018-02-13 RX ADMIN — LORAZEPAM 1 MG: 2 INJECTION INTRAMUSCULAR; INTRAVENOUS at 13:53

## 2018-02-13 RX ADMIN — FUROSEMIDE 20 MG: 10 INJECTION, SOLUTION INTRAMUSCULAR; INTRAVENOUS at 17:39

## 2018-02-13 RX ADMIN — ACETAMINOPHEN 650 MG: 325 TABLET, FILM COATED ORAL at 04:25

## 2018-02-13 RX ADMIN — VANCOMYCIN HYDROCHLORIDE 1000 MG: 1 INJECTION, SOLUTION INTRAVENOUS at 09:52

## 2018-02-13 RX ADMIN — LORAZEPAM 1 MG: 2 INJECTION INTRAMUSCULAR; INTRAVENOUS at 20:31

## 2018-02-13 RX ADMIN — VANCOMYCIN HYDROCHLORIDE 1000 MG: 1 INJECTION, SOLUTION INTRAVENOUS at 01:12

## 2018-02-13 RX ADMIN — AMLODIPINE BESYLATE 10 MG: 10 TABLET ORAL at 08:57

## 2018-02-13 RX ADMIN — FUROSEMIDE 20 MG: 10 INJECTION, SOLUTION INTRAMUSCULAR; INTRAVENOUS at 08:58

## 2018-02-13 RX ADMIN — INSULIN GLARGINE 30 UNITS: 100 INJECTION, SOLUTION SUBCUTANEOUS at 09:13

## 2018-02-13 RX ADMIN — GABAPENTIN 100 MG: 100 CAPSULE ORAL at 08:57

## 2018-02-13 RX ADMIN — LORAZEPAM 1 MG: 2 INJECTION INTRAMUSCULAR; INTRAVENOUS at 04:25

## 2018-02-13 NOTE — POST OP PROGRESS NOTES
Progress Note - General Surgery   Mauri Cano 27 y o  female MRN: [id number]  Unit/Bed#: E5 -01 Encounter: [id number]    Assessment:  Right thigh abscess with surrounding cellulitis  POD2 s/p bedside I+D, cultures positive for MRSA  Sepsis POA- improving  Leukocytosis- trending down  H/o MRSA  Type 1 DM  Hep C  IV heroin abuse       Plan:  Continue to monitor wound  Continue LWC with daily packing/dressing changes as ordered  Continue IV ABx per ID  Continue to monitor leukocytosis  Pain control as needed  Continue supportive care  Continue medical management and BS control  Monitor for heroin withdrawal  Follow up with Dr Rufino Yeager in the [address] after discharge    Subjective/Objective   Chief Complaint:  Right thigh pain    Subjective:  Still with pain and at the right thigh area but improving  Cultures positive for MRSA  Wound continues to drain  No fevers or chills  Tolerating diet  Leukocytosis improving  Objective:     Blood pressure 120/72, pulse (!) 108, temperature 98 °F (36 7 °C), temperature source Temporal, resp  rate 18, height 5' 7" (1 702 m), weight 72 6 kg (160 lb), last menstrual period 01/18/2018, SpO2 95 %  ,Body mass index is 25 06 kg/m²      No intake or output data in the 24 hours ending 02/13/18 1516    Invasive Devices     Peripheral Intravenous Line            Peripheral IV 02/09/18 Left Antecubital 3 days              Physical Exam: General appearance: alert and oriented, no acute distress  Neck: no carotid bruit, no JVD and supple, symmetrical, trachea midline  Lungs: clear to auscultation bilaterally  Heart: regular rate and rhythm, S1, S2 normal, no murmur, click, rub or gallop and regular rhythm, mild tachycardia, S1-S2 normal  Abdomen: soft, non-tender  Extremities: extremities normal, warm and well-perfused; no cyanosis, clubbing, or edema  Skin: Skin color, texture, turgor normal  No rashes  Right thigh with medial cellulitis with retraction from marked edges, medial induration improving, appox 3 cm open wound with packing in place, copious serous drainage, wound area soft      Lab, Imaging and other studies:  I have personally reviewed pertinent lab results    , CBC:   Lab Results   Component Value Date    WBC 15 29 (H) 02/13/2018    HGB 8 1 (L) 02/13/2018    HCT 26 8 (L) 02/13/2018    MCV 88 02/13/2018     (H) 02/13/2018    MCH 26 6 (L) 02/13/2018    MCHC 30 2 (L) 02/13/2018    RDW 16 6 (H) 02/13/2018    MPV 9 2 02/13/2018   , CMP:   Lab Results   Component Value Date     02/13/2018    K 3 7 02/13/2018     02/13/2018    CO2 34 (H) 02/13/2018    ANIONGAP 3 (L) 02/13/2018    BUN 5 02/13/2018    CREATININE 0 61 02/13/2018    GLUCOSE 52 (L) 02/13/2018    CALCIUM 8 6 02/13/2018    EGFR 119 02/13/2018     VTE Pharmacologic Prophylaxis: Enoxaparin (Lovenox)  VTE Mechanical Prophylaxis: sequential compression device     Corey Goodwin PA-C

## 2018-02-13 NOTE — PROGRESS NOTES
Progress Note - Infectious Disease   Mauri Cano 27 y o  female MRN: [id number]  Unit/Bed#: E5 -01 Encounter: [id number]      Impression/Plan:  1  Sepsis-POA  Tachycardia and leukocytosis  Appears all secondary to a right thigh cellulitis with abscess formation  No other clear sources appreciated  Consideration for the possibility of bacteremia  The patient appears to be tolerating the antibiotics without difficulty  The vancomycin trough is low   -continue vancomycin for now at increase dose  -follow-up blood cultures  -recheck CBC with diff and creatinine tomorrow  -no additional ID workup for now     2  Right thigh cellulitis/abscess-suspect MRSA with the patient's previous history of MRSA related abscess formation  Patient has undergone incision and drainage and cultures have been sent  The leg appears to have improved   -antibiotics as above  -follow up wound cultures and adjust antibiotics as needed  -local wound care  -close surgical follow-up  -possibly to oral antibiotics the next 48 hours if the patient is not bacteremic     3  Heroin abuse-patient continues to actively inject heroin  Consideration for the possibility of viral complications  -follow-up HIV screen and hepatitis panel  -case management evaluation for homelessness in rehab     4  Diabetes mellitus-type 1 with hyperglycemia    5  Acute hypoxic respiratory failure-appears secondary to pulmonary edema  No other clear source appreciated  Patient has received a dose of Lasix but still is requiring oxygen support  -O2 support  -diuresis as needed  -no additional ID workup for now    Discussed with Dr Ezio Tobias    Antibiotics:  Vancomycin 4    Subjective:  Patient has no fever, chills, sweats; no nausea, vomiting, diarrhea; no cough, but some shortness of breath; no increase pain  No new symptoms      Objective:  Vitals:  HR:  [108-119] 108  Resp:  [18] 18  BP: (120-125)/(68-72) 120/72  SpO2:  [89 %-97 %] 97 %  Temp (24hrs), Av 3 °F (36 8 °C), Min:98 °F (36 7 °C), Max:98 5 °F (36 9 °C)  Current: Temperature: 98 °F (36 7 °C)    Physical Exam:   General Appearance:  Alert, interactive, nontoxic, no acute distress  Patient is on oxygen by face mask   Throat: Oropharynx moist without lesions  Lungs:   Decreased breath sounds bilaterally; no wheezes, rhonchi but a few rales at the bases; respirations unlabored   Heart:  Tachycardic; no murmur, rub or gallop   Abdomen:   Soft, non-tender, non-distended, positive bowel sounds  Extremities: No clubbing, cyanosis  Decreased right leg drainage and erythema  Skin: No new rashes or lesions  No draining wounds noted  Labs, Imaging, & Other studies:   All pertinent labs and imaging studies were personally reviewed    Results from last 7 days  Lab Units 18  0539 18  0651 02/10/18  0621   WBC Thousand/uL 15 29* 21 62* 27 63*   HEMOGLOBIN g/dL 8 1* 8 1* 8 7*   PLATELETS Thousands/uL 403* 399* 356       Results from last 7 days  Lab Units 18  0539 18  0651 02/10/18  0621 18  2306   SODIUM mmol/L 142 141 136 138   POTASSIUM mmol/L 3 7 3 6 3 9 3 8   CHLORIDE mmol/L 105 106 102 100   CO2 mmol/L 34* 26 25 28   ANION GAP mmol/L 3* 9 9 10   BUN mg/dL 5 7 11 11   CREATININE mg/dL 0 61 0 68 0 72 0 68   EGFR ml/min/1 73sq m 119 114 110 114   GLUCOSE RANDOM mg/dL 52* 99 243* 97   CALCIUM mg/dL 8 6 8 4 8 7 9 4   AST U/L  --   --   --  13   ALT U/L  --   --   --  13   ALK PHOS U/L  --   --   --  145*   TOTAL PROTEIN g/dL  --   --   --  8 2   BILIRUBIN TOTAL mg/dL  --   --   --  0 48       Results from last 7 days  Lab Units 18  1126 02/10/18  0935 02/10/18  0933   BLOOD CULTURE   --  No Growth at 48 hrs  No Growth at 48 hrs     GRAM STAIN RESULT  3+ Polys  3+ Gram positive cocci in clusters  --   --    WOUND CULTURE  3+ Growth of Staphylococcus aureus*  --   --      CT chest-pulmonary edema without pulmonary embolism     Chest x-ray-pulmonary edema

## 2018-02-13 NOTE — SOCIAL WORK
Patient know to CM from previous admission; patient living at [facility]; when patient d/c last time, plan was for her to buy bus ticket to go home when she received her check on 2/3  Patient has script coverage; has boyfriend who provides support to her along with other friends from the shelter  Independent with ADL's and walking  Patient had face mask on for O2; will follow up again when able to talk with patient and determine/discuss d/c plan

## 2018-02-13 NOTE — PROGRESS NOTES
[facility] Internal [facility] Progress Note  Patient: Gladis Gonzalez 27 y o  female   MRN: [id number]  PCP: No primary care provider on file  Unit/Bed#: E5 -01 Encounter: [id number]  Date Of Visit: 02/13/18      Assessment/plan  Principal Problem:   1 Sepsis due to cellulitis and absess of right lower ext-likely related to known heroin abuse and poor self-care   due to MRSA   Continue vancomycin with dose adjustments per ID  S/p I and D  Blood cultures negative        2  Acute hypoxic respiratory failure due to pulmonary edema- which could be because of tachycardia, chf, or fluid protocol for sepsis  Will continue IV lasix  Continue to wean oxygen as tolerated  Echo is pending       Active Problems:  1  Type 1 diabetes mellitus with hyperglycemia-with poor compliance due to homelessness and drug addiction  Pt was hypoglycemic again this am  Will decrease lantus to 20 units and monitor  Continue insulin sliding scale       2  Heroin abuse-  Refer to host program  Monitor for withdrawal     3  GERD-continue Protonix daily     4  Homelessness-The patient lives in [address] [place] [address] plans to move back there when she is able to afford the trip back    Subjective:   Pt seen and examined  Pt denies sob  She is currently on venteri mask  No f/c no cp no n/v/d no abd pain  Objective:     Vitals: Blood pressure 120/72, pulse (!) 108, temperature 98 °F (36 7 °C), temperature source Temporal, resp  rate 18, height 5' 7" (1 702 m), weight 72 6 kg (160 lb), last menstrual period 01/18/2018, SpO2 95 %  ,Body mass index is 25 06 kg/m²      Lab, Imaging and other studies:    Results from last 7 days  Lab Units 02/13/18  0539   WBC Thousand/uL 15 29*   HEMOGLOBIN g/dL 8 1*   HEMATOCRIT % 26 8*   PLATELETS Thousands/uL 403*       Results from last 7 days  Lab Units 02/13/18  0539  02/09/18  2306   SODIUM mmol/L 142  < > 138   POTASSIUM mmol/L 3 7  < > 3 8   CHLORIDE mmol/L 105  < > 100   CO2 mmol/L 34*  < > 28   BUN mg/dL 5  < > 11   CREATININE mg/dL 0 61  < > 0 68   CALCIUM mg/dL 8 6  < > 9 4   TOTAL PROTEIN g/dL  --   --  8 2   BILIRUBIN TOTAL mg/dL  --   --  0 48   ALK PHOS U/L  --   --  145*   ALT U/L  --   --  13   AST U/L  --   --  13   GLUCOSE RANDOM mg/dL 52*  < > 97   < > = values in this interval not displayed  Lab Results   Component Value Date    BLOODCX No Growth at 72 hrs  02/10/2018    BLOODCX No Growth at 72 hrs  02/10/2018    BLOODCX No Growth After 5 Days  01/27/2018    WOUNDCULT (A) 02/11/2018     3+ Growth of Methicillin Resistant Staphylococcus aureus         Xr Chest Portable    Result Date: 1/28/2018  Narrative: CHEST INDICATION:  Fever COMPARISON:  None VIEWS:   AP frontal IMAGES:  1 FINDINGS:     Cardiomediastinal silhouette appears unremarkable  The lungs are clear  No pneumothorax or pleural effusion  Visualized osseous structures appear within normal limits for the patient's age  Impression: No active pulmonary disease  Workstation performed: [id number]     Xr Chest Pa & Lateral    Result Date: 2/13/2018  Narrative: CHEST INDICATION: Hypoxia  COMPARISON: 1/27/2018  VIEWS:  Frontal and lateral projections; 2 images FINDINGS: The cardiomediastinal silhouette is unremarkable  Pulmonary edema is identified  Moderate bilateral pleural effusions and bibasilar atelectasis noted  Osseous structures are age appropriate  Impression: Pulmonary edema  Moderate bilateral pleural effusions and associated bibasilar atelectasis  Workstation performed: [id number]     Cta Chest Pe Study    Result Date: 2/12/2018  Narrative: CTA - CHEST WITH IV CONTRAST - PULMONARY ANGIOGRAM INDICATION: Chest pain  Shortness of breath  COMPARISON: None  TECHNIQUE: CTA examination of the chest was performed using angiographic technique according to a protocol specifically tailored to evaluate for pulmonary embolism  Reformatted images were created in axial, sagittal, and coronal planes    In addition, coronal 3D MIP postprocessing was performed on the acquisition scanner  Radiation dose length product (DLP) for this visit:  246 mGy-cm   This examination, like all CT scans performed in the [facility], was performed utilizing techniques to minimize radiation dose exposure, including the use of iterative reconstruction and automated exposure control  IV Contrast:  85 mL of iohexol (OMNIPAQUE)  FINDINGS: PULMONARY ARTERIAL TREE:  No pulmonary embolus is seen  Enlarged 36 cm pulmonary artery consistent with pulmonary hypertension  LUNGS:  Diffuse reticulonodular interstitial thickening could relate to pulmonary edema  There is bibasilar consolidation which could relate to atelectasis and/or pneumonia  PLEURA:  Mild to moderate sized bilateral pleural effusions  HEART/AORTA:  Unremarkable for patient's age  MEDIASTINUM AND JAIDEN:  Mediastinal adenopathy could be reactive  CHEST WALL AND LOWER NECK: Normal  VISUALIZED STRUCTURES IN THE UPPER ABDOMEN:  Unremarkable  OSSEOUS STRUCTURES:  No acute fracture or destructive osseous lesion  Impression: 1  Diffuse reticulonodular interstitial thickening could relate to pulmonary edema  Follow-up to resolution with 2 view chest can be performed  2   Bibasilar consolidation which could relate to atelectasis and/or pneumonia  Follow-up to resolution with 2 view chest can be performed  3   No filling defect to suggest pulmonary embolism  4   Enlarged pulmonary artery suggesting pulmonary hypertension   Workstation performed: [id number]       Scheduled Meds:   Current Facility-Administered Medications:  acetaminophen 650 mg Oral Q6H PRN Marisol Castaneda PA-C    aluminum-magnesium hydroxide-simethicone 30 mL Oral Q6H PRN Marisol Castaneda PA-C    amLODIPine 10 mg Oral Daily Marisol Castaneda PA-C    enoxaparin 40 mg Subcutaneous Daily Marisol Castaneda PA-C    furosemide 20 mg Intravenous BID (diuretic) Moriah Choi DO    gabapentin 100 mg Oral TID Marisol Castaneda PA-C [START ON 2/14/2018] insulin glargine 20 Units Subcutaneous Daily Moriah Choi DO    insulin lispro 1-5 Units Subcutaneous TID AC Evgeny Ornelas PA-C    insulin lispro 1-5 Units Subcutaneous HS Evgeny Ornelas PA-C    ketorolac 30 mg Intravenous Q6H PRN Francisca Phelps MD    levalbuterol 1 25 mg Nebulization Q8H PRN Moriah Choi DO    lidocaine (PF) 10 mL Infiltration Once Kamilla Myrick MD    LORazepam 1 mg Intravenous Q4H PRN Evgeny Ornelas PA-C    nicotine 1 patch Transdermal Daily Evgeny Ornelas PA-C    ondansetron 4 mg Intravenous Q4H PRN Francisca Phelps MD    pantoprazole 40 mg Oral Early Morning Evgeny Ornelas PA-C    sodium chloride        sodium chloride 3 mL Nebulization Q8H PRN Moriah Choi DO    vancomycin 15 mg/kg Intravenous Q8H Mary King MD Last Rate: 1,000 mg (02/13/18 0952)   venlafaxine 37 5 mg Oral BID Evgeny Ornelas PA-C      Continuous Infusions:    PRN Meds:   acetaminophen    aluminum-magnesium hydroxide-simethicone    ketorolac    levalbuterol    LORazepam    ondansetron    sodium chloride      Physical exam:  Physical Exam  General appearance: alert and oriented, in no acute distress  Head: Normocephalic, without obvious abnormality, atraumatic  Eyes: conjunctivae/corneas clear  PERRL, EOM's intact  Fundi benign    Neck: no adenopathy, no carotid bruit, no JVD, supple, symmetrical, trachea midline and thyroid not enlarged, symmetric, no tenderness/mass/nodules  Lungs: rhales at bases bilateral  Heart: tachy s1 s2  Abdomen: soft, non-tender; bowel sounds normal; no masses,  no organomegaly  Extremities: extremities normal, warm and well-perfused; no cyanosis, clubbing, or edema  Pulses: 2+ and symmetric  Skin: Skin color, texture, turgor normal  No rashes or lesions  Neurologic: Grossly normal      VTE Pharmacologic Prophylaxis: Enoxaparin (Lovenox)  VTE Mechanical Prophylaxis: sequential compression device    Counseling / Coordination of Care  Total floor / unit time spent today 20 minutes    Current Length of Stay: 4 day(s)    Current Patient Status: Inpatient       Code Status: Level 1 - Full Code

## 2018-02-13 NOTE — PROGRESS NOTES
Previous shift alerted me that patient was experiencing shortness of breath and O2 sats of high 80's on 6L O2 nasal cannula  ABGs were drawn before my arrival  On my shift I received a critical lab value, Arterial pO2 of 44 4  Dr Shaun Da Silva was paged  The patient was placed on a Venturi mask per RT  O2 sats now in low 90s  PE study was ordered, and results were reviewed with NP Jade  No new orders at this time  Pt resting comfortably, AAOx4, no SOB/chest pain

## 2018-02-13 NOTE — CASE MANAGEMENT
Continued Stay Review    Date:   2/13/2018    Vital Signs: /72 (BP Location: Right arm)   Pulse (!) 108   Temp 98 °F (36 7 °C) (Temporal)   Resp 18   Ht 5' 7" (1 702 m)   Wt 72 6 kg (160 lb)   LMP 01/18/2018   SpO2 95%   BMI 25 06 kg/m²     Medications:   Scheduled Meds:   Current Facility-Administered Medications:  acetaminophen 650 mg Oral Q6H PRN Alicia Myers PA-C    aluminum-magnesium hydroxide-simethicone 30 mL Oral Q6H PRN Alicia Myers PA-C    amLODIPine 10 mg Oral Daily Alicia Myers PA-C    enoxaparin 40 mg Subcutaneous Daily Alicia Myers PA-C    furosemide 20 mg Intravenous BID (diuretic) Moriah Choi DO    gabapentin 100 mg Oral TID Alicia Myers PA-C    insulin glargine 30 Units Subcutaneous Daily Moriah Choi DO    insulin lispro 1-5 Units Subcutaneous TID AC Alicia Myers PA-C    insulin lispro 1-5 Units Subcutaneous HS Alicia Myers PA-C    ketorolac 30 mg Intravenous Q6H PRN Juliana Torre MD    levalbuterol 1 25 mg Nebulization Q8H PRN Moriah Choi DO    lidocaine (PF) 10 mL Infiltration Once Neida Thomson MD    LORazepam 1 mg Intravenous Q4H PRN Alicia Myers PA-C    nicotine 1 patch Transdermal Daily Alicia Myers PA-C    ondansetron 4 mg Intravenous Q4H PRN uJliana Torre MD    pantoprazole 40 mg Oral Early Morning Alicia Myers PA-C    sodium chloride        sodium chloride 3 mL Nebulization Q8H PRN Moriah Choi DO    vancomycin 15 mg/kg Intravenous Q8H Emmanuel Luque MD Last Rate: 1,000 mg (02/13/18 0952)   venlafaxine 37 5 mg Oral BID Alicia Myers PA-C      Continuous Infusions:    PRN Meds:   acetaminophen    aluminum-magnesium hydroxide-simethicone    ketorolac    levalbuterol    LORazepam    ondansetron    sodium chloride    Abnormal Labs/Diagnostic Results:    WBC   15 29  Hmgb   8 1    Age/Sex: 27 y o  female     Assessment/Plan:    Sepsis-POA   Tachycardia and leukocytosis    Appears all secondary to a right thigh cellulitis with abscess formation   No other clear sources appreciated   Consideration for the possibility of bacteremia   The patient appears to be tolerating the antibiotics without difficulty   The vancomycin trough is low   -continue vancomycin for now at increase dose  -follow-up blood cultures  -recheck CBC with diff and creatinine tomorrow  -no additional ID workup for now     2   Right thigh cellulitis/abscess-suspect MRSA with the patient's previous history of MRSA related abscess formation  Patient has undergone incision and drainage and cultures have been sent  Lilli Perez leg appears to have improved   -antibiotics as above  -follow up wound cultures and adjust antibiotics as needed  -local wound care  -close surgical follow-up  -possibly to oral antibiotics the next 48 hours if the patient is not bacteremic     3   Heroin abuse-patient continues to actively inject heroin   Consideration for the possibility of viral complications  -follow-up HIV screen and hepatitis panel  -case management evaluation for homelessness in rehab     4   Diabetes mellitus-type 1 with hyperglycemia    Acute hypoxic respiratory failure-appears secondary to pulmonary edema  No other clear source appreciated  Patient has received a dose of Lasix but still is requiring oxygen support    -O2 support  -diuresis as needed       Discharge Plan:   home

## 2018-02-13 NOTE — PLAN OF CARE
DISCHARGE PLANNING     Discharge to home or other facility with appropriate resources Progressing        INFECTION - ADULT     Absence or prevention of progression during hospitalization Progressing     Absence of fever/infection during neutropenic period Progressing        Knowledge Deficit     Patient/family/caregiver demonstrates understanding of disease process, treatment plan, medications, and discharge instructions Progressing        Nutrition/Hydration-ADULT     Nutrient/Hydration intake appropriate for improving, restoring or maintaining nutritional needs Progressing        PAIN - ADULT     Verbalizes/displays adequate comfort level or baseline comfort level Progressing        Potential for Falls     Patient will remain free of falls Progressing        SAFETY ADULT     Patient will remain free of falls Progressing     Maintain or return to baseline ADL function Progressing     Maintain or return mobility status to optimal level Progressing

## 2018-02-13 NOTE — PLAN OF CARE
Problem: DISCHARGE PLANNING - CARE MANAGEMENT  Goal: Discharge to post-acute care or home with appropriate resources  INTERVENTIONS:  - Conduct assessment to determine patient/family and health care team treatment goals, and need for post-acute services based on payer coverage, community resources, and patient preferences, and barriers to discharge  - Address psychosocial, clinical, and financial barriers to discharge as identified in assessment in conjunction with the patient/family and health care team  - Arrange appropriate level of post-acute services according to patient's   needs and preference and payer coverage in collaboration with the physician and health care team  - Communicate with and update the patient/family, physician, and health care team regarding progress on the discharge plan  - Arrange appropriate transportation to post-acute venues  Outcome: Progressing  D/C plan TBD based on whether patient will need long term IV abx

## 2018-02-14 ENCOUNTER — APPOINTMENT (INPATIENT)
Dept: NON INVASIVE DIAGNOSTICS | Facility: HOSPITAL | Age: 33
DRG: 853 | End: 2018-02-14
Payer: MEDICARE

## 2018-02-14 LAB
GLUCOSE SERPL-MCNC: 194 MG/DL (ref 65–140)
GLUCOSE SERPL-MCNC: 238 MG/DL (ref 65–140)
GLUCOSE SERPL-MCNC: 282 MG/DL (ref 65–140)
GLUCOSE SERPL-MCNC: 406 MG/DL (ref 65–140)
HBV CORE AB SER QL: ABNORMAL
HBV CORE IGM SER QL: ABNORMAL
HBV SURFACE AG SER QL: ABNORMAL
HCV AB SER QL: ABNORMAL
HIV 1+2 AB+HIV1 P24 AG SERPL QL IA: NORMAL

## 2018-02-14 PROCEDURE — 93306 TTE W/DOPPLER COMPLETE: CPT | Performed by: INTERNAL MEDICINE

## 2018-02-14 PROCEDURE — 99232 SBSQ HOSP IP/OBS MODERATE 35: CPT | Performed by: INTERNAL MEDICINE

## 2018-02-14 PROCEDURE — 93306 TTE W/DOPPLER COMPLETE: CPT

## 2018-02-14 PROCEDURE — 82948 REAGENT STRIP/BLOOD GLUCOSE: CPT

## 2018-02-14 RX ORDER — INSULIN GLARGINE 100 [IU]/ML
20 INJECTION, SOLUTION SUBCUTANEOUS DAILY
Status: DISCONTINUED | OUTPATIENT
Start: 2018-02-15 | End: 2018-02-15

## 2018-02-14 RX ADMIN — ENOXAPARIN SODIUM 40 MG: 40 INJECTION SUBCUTANEOUS at 09:40

## 2018-02-14 RX ADMIN — FUROSEMIDE 20 MG: 10 INJECTION, SOLUTION INTRAMUSCULAR; INTRAVENOUS at 09:41

## 2018-02-14 RX ADMIN — GABAPENTIN 100 MG: 100 CAPSULE ORAL at 21:00

## 2018-02-14 RX ADMIN — VANCOMYCIN HYDROCHLORIDE 1000 MG: 1 INJECTION, SOLUTION INTRAVENOUS at 09:40

## 2018-02-14 RX ADMIN — INSULIN LISPRO 1 UNITS: 100 INJECTION, SOLUTION INTRAVENOUS; SUBCUTANEOUS at 09:42

## 2018-02-14 RX ADMIN — LORAZEPAM 1 MG: 2 INJECTION INTRAMUSCULAR; INTRAVENOUS at 07:44

## 2018-02-14 RX ADMIN — ACETAMINOPHEN 650 MG: 325 TABLET, FILM COATED ORAL at 14:59

## 2018-02-14 RX ADMIN — VENLAFAXINE 37.5 MG: 37.5 TABLET ORAL at 09:43

## 2018-02-14 RX ADMIN — PANTOPRAZOLE SODIUM 40 MG: 40 TABLET, DELAYED RELEASE ORAL at 05:41

## 2018-02-14 RX ADMIN — INSULIN LISPRO 2 UNITS: 100 INJECTION, SOLUTION INTRAVENOUS; SUBCUTANEOUS at 21:01

## 2018-02-14 RX ADMIN — NICOTINE 1 PATCH: 14 PATCH TRANSDERMAL at 09:46

## 2018-02-14 RX ADMIN — LORAZEPAM 1 MG: 2 INJECTION INTRAMUSCULAR; INTRAVENOUS at 02:03

## 2018-02-14 RX ADMIN — INSULIN GLARGINE 15 UNITS: 100 INJECTION, SOLUTION SUBCUTANEOUS at 09:41

## 2018-02-14 RX ADMIN — ACETAMINOPHEN 650 MG: 325 TABLET, FILM COATED ORAL at 07:44

## 2018-02-14 RX ADMIN — INSULIN LISPRO 3 UNITS: 100 INJECTION, SOLUTION INTRAVENOUS; SUBCUTANEOUS at 16:38

## 2018-02-14 RX ADMIN — GABAPENTIN 100 MG: 100 CAPSULE ORAL at 16:37

## 2018-02-14 RX ADMIN — KETOROLAC TROMETHAMINE 30 MG: 30 INJECTION, SOLUTION INTRAMUSCULAR at 10:28

## 2018-02-14 RX ADMIN — GABAPENTIN 100 MG: 100 CAPSULE ORAL at 09:41

## 2018-02-14 RX ADMIN — ACETAMINOPHEN 650 MG: 325 TABLET, FILM COATED ORAL at 21:00

## 2018-02-14 RX ADMIN — LORAZEPAM 1 MG: 2 INJECTION INTRAMUSCULAR; INTRAVENOUS at 17:05

## 2018-02-14 RX ADMIN — INSULIN LISPRO 5 UNITS: 100 INJECTION, SOLUTION INTRAVENOUS; SUBCUTANEOUS at 11:52

## 2018-02-14 RX ADMIN — LORAZEPAM 1 MG: 2 INJECTION INTRAMUSCULAR; INTRAVENOUS at 11:53

## 2018-02-14 RX ADMIN — AMLODIPINE BESYLATE 10 MG: 10 TABLET ORAL at 09:41

## 2018-02-14 RX ADMIN — VANCOMYCIN HYDROCHLORIDE 1000 MG: 1 INJECTION, SOLUTION INTRAVENOUS at 00:21

## 2018-02-14 RX ADMIN — KETOROLAC TROMETHAMINE 30 MG: 30 INJECTION, SOLUTION INTRAMUSCULAR at 22:35

## 2018-02-14 RX ADMIN — KETOROLAC TROMETHAMINE 30 MG: 30 INJECTION, SOLUTION INTRAMUSCULAR at 02:46

## 2018-02-14 RX ADMIN — LORAZEPAM 1 MG: 2 INJECTION INTRAMUSCULAR; INTRAVENOUS at 21:01

## 2018-02-14 RX ADMIN — KETOROLAC TROMETHAMINE 30 MG: 30 INJECTION, SOLUTION INTRAMUSCULAR at 16:37

## 2018-02-14 RX ADMIN — VANCOMYCIN HYDROCHLORIDE 1000 MG: 1 INJECTION, SOLUTION INTRAVENOUS at 17:05

## 2018-02-14 RX ADMIN — VENLAFAXINE 37.5 MG: 37.5 TABLET ORAL at 17:04

## 2018-02-14 NOTE — PROGRESS NOTES
Pt's blood sugar was 33  Upon rechecking, it was 41  Gave orange juice and gram crackers  Pt then started to have symptoms of shakiness, dizziness, and difficulty seeing  Tried to call provider for dextrose injection order but unable to get a hold  Did override pull and gave pt IV injection of Dextrose 50%  Pt's symptoms subsided  Contacted Dr Cindy Tineo and notified of situation  Pt's sugar was stable upon re-assessing

## 2018-02-14 NOTE — PROGRESS NOTES
[facility] Progress Note  Patient: Allan Moroe 27 y o  female   MRN: [id number]  PCP: No primary care provider on file  Unit/Bed#: E5 -01 Encounter: [id number]  Date Of Visit: 02/14/18      Assessment/plan  Principal Problem:   1 Sepsis due to cellulitis and absess of right lower ext-likely related to known heroin abuse and poor self-care   due to MRSA   Continue vancomycin with dose adjustments per ID  S/p I and D  Blood cultures negative  Possible po antibiotics tomorrow     2  Acute hypoxic respiratory failure due to pulmonary edema- which could be because of tachycardia, chf, or fluid protocol for sepsis  Will continue IV lasix  Continue to wean oxygen as tolerated  Echo is pending       Active Problems:  1  Type 1 diabetes mellitus with hyperglycemia-with poor compliance due to homelessness and drug addiction  Pt had hypoglycemia yesterday in which lantus was decreased to 15 units  She now has hyperglycemia  Will increase lantus to 20 units and monitor  May need to give additional lantus depending on repeat blood glucose       2  Heroin abuse-  Refer to host program  Monitor for withdrawal     3  GERD-continue Protonix daily     4  Homelessness-The patient lives in [address] [place] [address] plans to move back there when she is able to afford the trip back    Subjective:   Pt seen and examined  Pt reports her breathing has improved  She states every now and then when she takes a deep breath it catches  She denies any abd pain  No n/v/d no f/c no cp  No labs this am  Pt was educated to have labs  She will allow them to be drawn tomorrow  Objective:     Vitals: Blood pressure 104/61, pulse 103, temperature 98 2 °F (36 8 °C), temperature source Temporal, resp  rate 18, height 5' 7" (1 702 m), weight 72 6 kg (160 lb), last menstrual period 01/18/2018, SpO2 97 %  ,Body mass index is 25 06 kg/m²      Lab, Imaging and other studies:    Results from last 7 days  Lab Units 02/13/18  0539   WBC Thousand/uL 15 29*   HEMOGLOBIN g/dL 8 1*   HEMATOCRIT % 26 8*   PLATELETS Thousands/uL 403*       Results from last 7 days  Lab Units 02/13/18  0539  02/09/18  2306   SODIUM mmol/L 142  < > 138   POTASSIUM mmol/L 3 7  < > 3 8   CHLORIDE mmol/L 105  < > 100   CO2 mmol/L 34*  < > 28   BUN mg/dL 5  < > 11   CREATININE mg/dL 0 61  < > 0 68   CALCIUM mg/dL 8 6  < > 9 4   TOTAL PROTEIN g/dL  --   --  8 2   BILIRUBIN TOTAL mg/dL  --   --  0 48   ALK PHOS U/L  --   --  145*   ALT U/L  --   --  13   AST U/L  --   --  13   GLUCOSE RANDOM mg/dL 52*  < > 97   < > = values in this interval not displayed  Lab Results   Component Value Date    BLOODCX No Growth After 4 Days  02/10/2018    BLOODCX No Growth After 4 Days  02/10/2018    BLOODCX No Growth After 5 Days   01/27/2018    WOUNDCULT (A) 02/11/2018     3+ Growth of Methicillin Resistant Staphylococcus aureus     Scheduled Meds:   Current Facility-Administered Medications:  acetaminophen 650 mg Oral Q6H PRN Radha Naranjo PA-C    aluminum-magnesium hydroxide-simethicone 30 mL Oral Q6H PRN Radha Naranjo PA-C    amLODIPine 10 mg Oral Daily Radha Naranjo PA-C    enoxaparin 40 mg Subcutaneous Daily Radha Naranjo PA-C    furosemide 20 mg Intravenous BID (diuretic) Moriah Choi DO    gabapentin 100 mg Oral TID Radha Naranjo PA-C    [START ON 2/15/2018] insulin glargine 20 Units Subcutaneous Daily Moriah Choi DO    insulin lispro 1-5 Units Subcutaneous TID AC Jessica Egan PA-C    insulin lispro 1-5 Units Subcutaneous HS Radha Naranjo PA-C    ketorolac 30 mg Intravenous Q6H PRN Tim Ruby MD    levalbuterol 1 25 mg Nebulization Q8H PRN Moriah Choi DO    lidocaine (PF) 10 mL Infiltration Once Saroj Iraheta MD    LORazepam 1 mg Intravenous Q4H PRN Radha Naranjo PA-C    nicotine 1 patch Transdermal Daily Radha Naranjo PA-C    ondansetron 4 mg Intravenous Q4H PRN Tim Ruby MD    pantoprazole 40 mg Oral Early Morning Radha Naranjo PA-C sodium chloride 3 mL Nebulization Q8H PRN Moriah Choi DO    vancomycin 15 mg/kg Intravenous Q8H Austen Flynn MD Last Rate: 1,000 mg (02/14/18 0940)   venlafaxine 37 5 mg Oral BID Yecenia Rolle PA-C      Continuous Infusions:    PRN Meds:   acetaminophen    aluminum-magnesium hydroxide-simethicone    ketorolac    levalbuterol    LORazepam    ondansetron    sodium chloride      Physical exam:  Physical Exam  General appearance: alert and oriented, in no acute distress  Head: Normocephalic, without obvious abnormality, atraumatic  Eyes: conjunctivae/corneas clear  PERRL, EOM's intact  Fundi benign    Neck: no adenopathy, no carotid bruit, no JVD, supple, symmetrical, trachea midline and thyroid not enlarged, symmetric, no tenderness/mass/nodules  Lungs: clear to auscultation bilaterally  Heart: regular rate and rhythm, S1, S2 normal, no murmur, click, rub or gallop  Abdomen: soft, non-tender; bowel sounds normal; no masses,  no organomegaly  Extremities: extremities normal, warm and well-perfused; no cyanosis, clubbing, or edema  Pulses: 2+ and symmetric  Skin: Skin color, texture, turgor normal  No rashes or lesions  Neurologic: Grossly normal      VTE Pharmacologic Prophylaxis: Enoxaparin (Lovenox)  VTE Mechanical Prophylaxis: sequential compression device    Counseling / Coordination of Care  Total floor / unit time spent today 20 minutes      Current Length of Stay: 5 day(s)    Current Patient Status: Inpatient       Code Status: Level 1 - Full Code

## 2018-02-14 NOTE — PROGRESS NOTES
Progress Note - Infectious Disease   Mala Rendon 27 y o  female MRN: [id number]  Unit/Bed#: E5 -01 Encounter: [id number]      Impression/Plan:  1   Sepsis-POA   Tachycardia and leukocytosis   Appears all secondary to a right thigh cellulitis with abscess formation   No other clear sources appreciated   Consideration for the possibility of bacteremia   The patient appears to be tolerating the antibiotics without difficulty   The vancomycin trough is low   -continue vancomycin for now at increased dose  -follow-up blood cultures  -recheck CBC with diff and creatinine tomorrow  -no additional ID workup for now     2   Right thigh cellulitis/abscess-suspect MRSA with the patient's previous history of MRSA related abscess formation  Patient has undergone incision and drainage and cultures have been sent  Estrellita Numbers leg appears to have improved   -antibiotics as above  -follow up wound cultures and adjust antibiotics as needed  -local wound care  -close surgical follow-up  -possibly to oral antibiotics tomorrow if the patient continues to improve     3   Heroin abuse-patient continues to actively inject heroin   Consideration for the possibility of viral complications  -follow-up HIV screen and hepatitis panel  -case management evaluation for homelessness in rehab     4   Diabetes mellitus-type 1 with hyperglycemia     5  Acute hypoxic respiratory failure-appears secondary to pulmonary edema  No other clear source appreciated  Patient has received a dose of Lasix but still is requiring oxygen support  -O2 support  -diuresis as needed  -no additional ID workup for now  -follow-up echo    Antibiotics:  Vancomycin 5    Subjective:  Patient has no fever, chills, sweats; no nausea, vomiting, diarrhea; no cough, still some shortness of breath; decreased pain  No new symptoms    She still requiring O2 by face mask    Objective:  Vitals:  HR:  [106-113] 107  Resp:  [18] 18  BP: (116-131)/(58-73) 116/73  SpO2:  [90 %-95 %] 95 %  Temp (24hrs), Av 7 °F (37 1 °C), Min:98 4 °F (36 9 °C), Max:98 9 °F (37 2 °C)  Current: Temperature: 98 9 °F (37 2 °C)    Physical Exam:   General Appearance:  Alert, interactive, nontoxic, no acute distress  Throat: Oropharynx moist without lesions  Lungs:   Decreased breath sounds bilaterally; no wheezes, rhonchi or rales; respirations unlabored   Heart:  Tachycardia; no murmur, rub or gallop   Abdomen:   Soft, non-tender, non-distended, positive bowel sounds  Extremities: No clubbing, cyanosis  Right thigh with decreased erythema induration  Still with some drainage   Skin: No new rashes or lesions  No draining wounds noted  Labs, Imaging, & Other studies:   All pertinent labs and imaging studies were personally reviewed    Results from last 7 days  Lab Units 18  0539 18  0651 02/10/18  0621   WBC Thousand/uL 15 29* 21 62* 27 63*   HEMOGLOBIN g/dL 8 1* 8 1* 8 7*   PLATELETS Thousands/uL 403* 399* 356       Results from last 7 days  Lab Units 18  0539 18  0651 02/10/18  0621 18  2306   SODIUM mmol/L 142 141 136 138   POTASSIUM mmol/L 3 7 3 6 3 9 3 8   CHLORIDE mmol/L 105 106 102 100   CO2 mmol/L 34* 26 25 28   ANION GAP mmol/L 3* 9 9 10   BUN mg/dL 5 7 11 11   CREATININE mg/dL 0 61 0 68 0 72 0 68   EGFR ml/min/1 73sq m 119 114 110 114   GLUCOSE RANDOM mg/dL 52* 99 243* 97   CALCIUM mg/dL 8 6 8 4 8 7 9 4   AST U/L  --   --   --  13   ALT U/L  --   --   --  13   ALK PHOS U/L  --   --   --  145*   TOTAL PROTEIN g/dL  --   --   --  8 2   BILIRUBIN TOTAL mg/dL  --   --   --  0 48       Results from last 7 days  Lab Units 18  1126 02/10/18  0935 02/10/18  0933   BLOOD CULTURE   --  No Growth at 72 hrs  No Growth at 72 hrs     GRAM STAIN RESULT  3+ Polys  3+ Gram positive cocci in clusters  --   --    WOUND CULTURE  3+ Growth of Methicillin Resistant Staphylococcus aureus*  --   --

## 2018-02-14 NOTE — PLAN OF CARE
Problem: PAIN - ADULT  Goal: Verbalizes/displays adequate comfort level or baseline comfort level  Interventions:  - Encourage patient to monitor pain and request assistance  - Assess pain using appropriate pain scale  - Administer analgesics based on type and severity of pain and evaluate response  - Implement non-pharmacological measures as appropriate and evaluate response  - Consider cultural and social influences on pain and pain management  - Notify physician/advanced practitioner if interventions unsuccessful or patient reports new pain   Outcome: Progressing      Problem: INFECTION - ADULT  Goal: Absence or prevention of progression during hospitalization  INTERVENTIONS:  - Assess and monitor for signs and symptoms of infection  - Monitor lab/diagnostic results  - Monitor all insertion sites, i e  indwelling lines, tubes, and drains  - Monitor endotracheal (as able) and nasal secretions for changes in amount and color  - [place] appropriate cooling/warming therapies per order  - Administer medications as ordered  - Instruct and encourage patient and family to use good hand hygiene technique  - Identify and instruct in appropriate isolation precautions for identified infection/condition   Outcome: Progressing    Goal: Absence of fever/infection during neutropenic period  INTERVENTIONS:  - Monitor WBC  - Implement neutropenic guidelines   Outcome: Progressing      Problem: SAFETY ADULT  Goal: Patient will remain free of falls  INTERVENTIONS:  - Assess patient frequently for physical needs  -  Identify cognitive and physical deficits and behaviors that affect risk of falls    -  [place] fall precautions as indicated by assessment   - Educate patient/family on patient safety including physical limitations  - Instruct patient to call for assistance with activity based on assessment  - Modify environment to reduce risk of injury  - Consider OT/PT consult to assist with strengthening/mobility    Outcome: Progressing    Goal: Maintain or return to baseline ADL function  INTERVENTIONS:  -  Assess patient's ability to carry out ADLs; assess patient's baseline for ADL function and identify physical deficits which impact ability to perform ADLs (bathing, care of mouth/teeth, toileting, grooming, dressing, etc )  - Assess/evaluate cause of self-care deficits   - Assess range of motion  - Assess patient's mobility; develop plan if impaired  - Assess patient's need for assistive devices and provide as appropriate  - Encourage maximum independence but intervene and supervise when necessary  ¯ Involve family in performance of ADLs  ¯ Assess for home care needs following discharge   ¯ Request OT consult to assist with ADL evaluation and planning for discharge  ¯ Provide patient education as appropriate   Outcome: Progressing    Goal: Maintain or return mobility status to optimal level  INTERVENTIONS:  - Assess patient's baseline mobility status (ambulation, transfers, stairs, etc )    - Identify cognitive and physical deficits and behaviors that affect mobility  - Identify mobility aids required to assist with transfers and/or ambulation (gait belt, sit-to-stand, lift, walker, cane, etc )  - [place] fall precautions as indicated by assessment  - Record patient progress and toleration of activity level on Mobility SBAR; progress patient to next Phase/Stage  - Instruct patient to call for assistance with activity based on assessment  - Request Rehabilitation consult to assist with strengthening/weightbearing, etc    Outcome: Progressing      Problem: DISCHARGE PLANNING  Goal: Discharge to home or other facility with appropriate resources  INTERVENTIONS:  - Identify barriers to discharge w/patient and caregiver  - Arrange for needed discharge resources and transportation as appropriate  - Identify discharge learning needs (meds, wound care, etc )  - Arrange for interpretive services to assist at discharge as needed  - Refer to Case Management Department for coordinating discharge planning if the patient needs post-hospital services based on physician/advanced practitioner order or complex needs related to functional status, cognitive ability, or social support system   Outcome: Progressing      Problem: Knowledge Deficit  Goal: Patient/family/caregiver demonstrates understanding of disease process, treatment plan, medications, and discharge instructions  Complete learning assessment and assess knowledge base  Interventions:  - Provide teaching at level of understanding  - Provide teaching via preferred learning methods   Outcome: Progressing      Problem: Potential for Falls  Goal: Patient will remain free of falls  INTERVENTIONS:  - Assess patient frequently for physical needs  -  Identify cognitive and physical deficits and behaviors that affect risk of falls  -  [place] fall precautions as indicated by assessment   - Educate patient/family on patient safety including physical limitations  - Instruct patient to call for assistance with activity based on assessment  - Modify environment to reduce risk of injury  - Consider OT/PT consult to assist with strengthening/mobility    Outcome: Progressing      Problem: Nutrition/Hydration-ADULT  Goal: Nutrient/Hydration intake appropriate for improving, restoring or maintaining nutritional needs  Monitor and assess patient's nutrition/hydration status for malnutrition (ex- brittle hair, bruises, dry skin, pale skin and conjunctiva, muscle wasting, smooth red tongue, and disorientation)  Collaborate with interdisciplinary team and initiate plan and interventions as ordered  Monitor patient's weight and dietary intake as ordered or per policy  Utilize nutrition screening tool and intervene per policy  Determine patient's food preferences and provide high-protein, high-caloric foods as appropriate       INTERVENTIONS:  - Monitor oral intake, urinary output, labs, and treatment plans  - Assess nutrition and hydration status and recommend course of action  - Evaluate amount of meals eaten  - Assist patient with eating if necessary   - Allow adequate time for meals  - Recommend/ encourage appropriate diets, oral nutritional supplements, and vitamin/mineral supplements  - Order, calculate, and assess calorie counts as needed  - Recommend, monitor, and adjust tube feedings and TPN/PPN based on assessed needs  - Assess need for intravenous fluids  - Provide specific nutrition/hydration education as appropriate  - Include patient/family/caregiver in decisions related to nutrition   Outcome: Progressing      Problem: DISCHARGE PLANNING - CARE MANAGEMENT  Goal: Discharge to post-acute care or home with appropriate resources  INTERVENTIONS:  - Conduct assessment to determine patient/family and health care team treatment goals, and need for post-acute services based on payer coverage, community resources, and patient preferences, and barriers to discharge  - Address psychosocial, clinical, and financial barriers to discharge as identified in assessment in conjunction with the patient/family and health care team  - Arrange appropriate level of post-acute services according to patient's   needs and preference and payer coverage in collaboration with the physician and health care team  - Communicate with and update the patient/family, physician, and health care team regarding progress on the discharge plan  - Arrange appropriate transportation to post-acute venues   Outcome: Progressing

## 2018-02-15 LAB
ANION GAP SERPL CALCULATED.3IONS-SCNC: 7 MMOL/L (ref 4–13)
BACTERIA BLD CULT: NORMAL
BACTERIA BLD CULT: NORMAL
BUN SERPL-MCNC: 18 MG/DL (ref 5–25)
CALCIUM SERPL-MCNC: 9.3 MG/DL (ref 8.3–10.1)
CHLORIDE SERPL-SCNC: 96 MMOL/L (ref 100–108)
CO2 SERPL-SCNC: 32 MMOL/L (ref 21–32)
CREAT SERPL-MCNC: 0.83 MG/DL (ref 0.6–1.3)
GFR SERPL CREATININE-BSD FRML MDRD: 92 ML/MIN/1.73SQ M
GLUCOSE SERPL-MCNC: 262 MG/DL (ref 65–140)
GLUCOSE SERPL-MCNC: 319 MG/DL (ref 65–140)
GLUCOSE SERPL-MCNC: 354 MG/DL (ref 65–140)
GLUCOSE SERPL-MCNC: 469 MG/DL (ref 65–140)
GLUCOSE SERPL-MCNC: 492 MG/DL (ref 65–140)
POTASSIUM SERPL-SCNC: 4.4 MMOL/L (ref 3.5–5.3)
SODIUM SERPL-SCNC: 135 MMOL/L (ref 136–145)

## 2018-02-15 PROCEDURE — 0J9L0ZZ DRAINAGE OF RIGHT UPPER LEG SUBCUTANEOUS TISSUE AND FASCIA, OPEN APPROACH: ICD-10-PCS | Performed by: SURGERY

## 2018-02-15 PROCEDURE — 99232 SBSQ HOSP IP/OBS MODERATE 35: CPT | Performed by: INTERNAL MEDICINE

## 2018-02-15 PROCEDURE — 99233 SBSQ HOSP IP/OBS HIGH 50: CPT | Performed by: INTERNAL MEDICINE

## 2018-02-15 PROCEDURE — 82948 REAGENT STRIP/BLOOD GLUCOSE: CPT

## 2018-02-15 PROCEDURE — 10061 I&D ABSCESS COMP/MULTIPLE: CPT | Performed by: SURGERY

## 2018-02-15 PROCEDURE — 80048 BASIC METABOLIC PNL TOTAL CA: CPT | Performed by: INTERNAL MEDICINE

## 2018-02-15 RX ORDER — LIDOCAINE HYDROCHLORIDE 10 MG/ML
20 INJECTION, SOLUTION EPIDURAL; INFILTRATION; INTRACAUDAL; PERINEURAL ONCE
Status: COMPLETED | OUTPATIENT
Start: 2018-02-15 | End: 2018-02-15

## 2018-02-15 RX ORDER — INSULIN GLARGINE 100 [IU]/ML
30 INJECTION, SOLUTION SUBCUTANEOUS DAILY
Status: DISCONTINUED | OUTPATIENT
Start: 2018-02-16 | End: 2018-02-16 | Stop reason: HOSPADM

## 2018-02-15 RX ORDER — IBUPROFEN 200 MG
200 TABLET ORAL EVERY 6 HOURS PRN
Status: DISCONTINUED | OUTPATIENT
Start: 2018-02-15 | End: 2018-02-16 | Stop reason: HOSPADM

## 2018-02-15 RX ORDER — CLINDAMYCIN HYDROCHLORIDE 150 MG/1
300 CAPSULE ORAL EVERY 6 HOURS SCHEDULED
Status: DISCONTINUED | OUTPATIENT
Start: 2018-02-15 | End: 2018-02-16 | Stop reason: HOSPADM

## 2018-02-15 RX ORDER — INSULIN GLARGINE 100 [IU]/ML
10 INJECTION, SOLUTION SUBCUTANEOUS ONCE
Status: COMPLETED | OUTPATIENT
Start: 2018-02-15 | End: 2018-02-15

## 2018-02-15 RX ADMIN — LORAZEPAM 1 MG: 2 INJECTION INTRAMUSCULAR; INTRAVENOUS at 09:48

## 2018-02-15 RX ADMIN — ENOXAPARIN SODIUM 40 MG: 40 INJECTION SUBCUTANEOUS at 08:50

## 2018-02-15 RX ADMIN — LORAZEPAM 1 MG: 2 INJECTION INTRAMUSCULAR; INTRAVENOUS at 01:00

## 2018-02-15 RX ADMIN — ACETAMINOPHEN 650 MG: 325 TABLET, FILM COATED ORAL at 23:35

## 2018-02-15 RX ADMIN — FUROSEMIDE 20 MG: 10 INJECTION, SOLUTION INTRAMUSCULAR; INTRAVENOUS at 08:50

## 2018-02-15 RX ADMIN — IBUPROFEN 200 MG: 200 TABLET, FILM COATED ORAL at 23:36

## 2018-02-15 RX ADMIN — INSULIN LISPRO 2 UNITS: 100 INJECTION, SOLUTION INTRAVENOUS; SUBCUTANEOUS at 22:15

## 2018-02-15 RX ADMIN — INSULIN GLARGINE 20 UNITS: 100 INJECTION, SOLUTION SUBCUTANEOUS at 08:51

## 2018-02-15 RX ADMIN — VENLAFAXINE 37.5 MG: 37.5 TABLET ORAL at 17:38

## 2018-02-15 RX ADMIN — CLINDAMYCIN HYDROCHLORIDE 300 MG: 150 CAPSULE ORAL at 11:54

## 2018-02-15 RX ADMIN — PANTOPRAZOLE SODIUM 40 MG: 40 TABLET, DELAYED RELEASE ORAL at 05:02

## 2018-02-15 RX ADMIN — LORAZEPAM 1 MG: 2 INJECTION INTRAMUSCULAR; INTRAVENOUS at 05:09

## 2018-02-15 RX ADMIN — CLINDAMYCIN HYDROCHLORIDE 300 MG: 150 CAPSULE ORAL at 23:03

## 2018-02-15 RX ADMIN — VANCOMYCIN HYDROCHLORIDE 1000 MG: 1 INJECTION, SOLUTION INTRAVENOUS at 00:10

## 2018-02-15 RX ADMIN — LORAZEPAM 1 MG: 2 INJECTION INTRAMUSCULAR; INTRAVENOUS at 21:20

## 2018-02-15 RX ADMIN — CLINDAMYCIN HYDROCHLORIDE 300 MG: 150 CAPSULE ORAL at 17:38

## 2018-02-15 RX ADMIN — IBUPROFEN 200 MG: 200 TABLET, FILM COATED ORAL at 18:04

## 2018-02-15 RX ADMIN — GABAPENTIN 100 MG: 100 CAPSULE ORAL at 16:26

## 2018-02-15 RX ADMIN — VENLAFAXINE 37.5 MG: 37.5 TABLET ORAL at 08:51

## 2018-02-15 RX ADMIN — GABAPENTIN 100 MG: 100 CAPSULE ORAL at 08:51

## 2018-02-15 RX ADMIN — AMLODIPINE BESYLATE 10 MG: 10 TABLET ORAL at 08:51

## 2018-02-15 RX ADMIN — INSULIN LISPRO 5 UNITS: 100 INJECTION, SOLUTION INTRAVENOUS; SUBCUTANEOUS at 08:51

## 2018-02-15 RX ADMIN — INSULIN LISPRO 4 UNITS: 100 INJECTION, SOLUTION INTRAVENOUS; SUBCUTANEOUS at 16:31

## 2018-02-15 RX ADMIN — GABAPENTIN 100 MG: 100 CAPSULE ORAL at 21:21

## 2018-02-15 RX ADMIN — KETOROLAC TROMETHAMINE 30 MG: 30 INJECTION, SOLUTION INTRAMUSCULAR at 11:32

## 2018-02-15 RX ADMIN — INSULIN LISPRO 5 UNITS: 100 INJECTION, SOLUTION INTRAVENOUS; SUBCUTANEOUS at 11:54

## 2018-02-15 RX ADMIN — VANCOMYCIN HYDROCHLORIDE 1000 MG: 1 INJECTION, SOLUTION INTRAVENOUS at 08:51

## 2018-02-15 RX ADMIN — NICOTINE 1 PATCH: 14 PATCH TRANSDERMAL at 08:51

## 2018-02-15 RX ADMIN — KETOROLAC TROMETHAMINE 30 MG: 30 INJECTION, SOLUTION INTRAMUSCULAR at 05:01

## 2018-02-15 RX ADMIN — LIDOCAINE HYDROCHLORIDE 20 ML: 10 INJECTION, SOLUTION EPIDURAL; INFILTRATION; INTRACAUDAL; PERINEURAL at 16:29

## 2018-02-15 RX ADMIN — INSULIN GLARGINE 10 UNITS: 100 INJECTION, SOLUTION SUBCUTANEOUS at 12:53

## 2018-02-15 RX ADMIN — LORAZEPAM 1 MG: 2 INJECTION INTRAMUSCULAR; INTRAVENOUS at 14:27

## 2018-02-15 RX ADMIN — ACETAMINOPHEN 650 MG: 325 TABLET, FILM COATED ORAL at 09:48

## 2018-02-15 RX ADMIN — ACETAMINOPHEN 650 MG: 325 TABLET, FILM COATED ORAL at 18:04

## 2018-02-15 NOTE — PROGRESS NOTES
[facility] Progress Note  Patient: Jair Preston 27 y o  female   MRN: [id number]  PCP: No primary care provider on file  Unit/Bed#: E5 -01 Encounter: [id number]  Date Of Visit: 02/15/18   Addendum: d/long toradol as pt had been on this and lasix  No labs for the last two days  Will write for ibuprofen as her pain medications are limited due to history of abuse but in order to continue toradol will need labs for today to look at creatinine      Assessment/plan  Principal Problem:   1 Sepsis due to cellulitis and absess of right lower ext-likely related to known heroin abuse and poor self-care   due to MRSA  S/p I and D  Appreciate ID recommendations  Pt changed to clindamycin 300mg q6 through 2/19  New area on buttock concerning for new abscess  Have asked surgery to re eval for possible I and D       2  Acute hypoxic respiratory failure due to pulmonary edema due to tachycardia and IVF  Echo reviewed  Pt now on room air  Will d/c lasix  No further lasix needed       Active Problems:  1  Type 1 diabetes mellitus with hyperglycemia-with poor compliance due to homelessness and drug addiction  Pt has had hypo and hyperglycemia in hospital  Gave an extra dose of lantus 10 units today  Will increase am lantus to 30 units  Will monitor to see if need to adjust further       2  Heroin abuse-  Refer to host program  Monitor for withdrawal     3  GERD-continue Protonix daily     4  Homelessness-The patient lives in [address] [place] [address] plans to move back there when she is able to afford the trip back    dispo- await surgery re eval    Subjective:   Pt seen and examined  Pt found a new lesion on her buttock last night  No f/c no cp no sob  She has been off of the oxygen since last night  No n/v/d no abd pain    Objective:     Vitals: Blood pressure 116/72, pulse 95, temperature 98 6 °F (37 °C), temperature source Temporal, resp   rate 18, height 5' 7" (1 702 m), weight 72 6 kg (160 lb), last menstrual period 01/18/2018, SpO2 97 %  ,Body mass index is 25 06 kg/m²  Lab, Imaging and other studies:    Results from last 7 days  Lab Units 02/13/18  0539   WBC Thousand/uL 15 29*   HEMOGLOBIN g/dL 8 1*   HEMATOCRIT % 26 8*   PLATELETS Thousands/uL 403*       Results from last 7 days  Lab Units 02/13/18  0539  02/09/18  2306   SODIUM mmol/L 142  < > 138   POTASSIUM mmol/L 3 7  < > 3 8   CHLORIDE mmol/L 105  < > 100   CO2 mmol/L 34*  < > 28   BUN mg/dL 5  < > 11   CREATININE mg/dL 0 61  < > 0 68   CALCIUM mg/dL 8 6  < > 9 4   TOTAL PROTEIN g/dL  --   --  8 2   BILIRUBIN TOTAL mg/dL  --   --  0 48   ALK PHOS U/L  --   --  145*   ALT U/L  --   --  13   AST U/L  --   --  13   GLUCOSE RANDOM mg/dL 52*  < > 97   < > = values in this interval not displayed  Lab Results   Component Value Date    BLOODCX No Growth After 5 Days  02/10/2018    BLOODCX No Growth After 5 Days  02/10/2018    BLOODCX No Growth After 5 Days   01/27/2018    WOUNDCULT (A) 02/11/2018     3+ Growth of Methicillin Resistant Staphylococcus aureus       Scheduled Meds:   Current Facility-Administered Medications:  acetaminophen 650 mg Oral Q6H PRN Wanda Vernon PA-C   aluminum-magnesium hydroxide-simethicone 30 mL Oral Q6H PRN Wanda Vernon PA-C   amLODIPine 10 mg Oral Daily Wanda Vernon PA-C   clindamycin 300 mg Oral Q6H [facility] Faustino Gresham MD   enoxaparin 40 mg Subcutaneous Daily Wanda Vernon PA-C   gabapentin 100 mg Oral TID Wanda Vernon PA-C   [START ON 2/16/2018] insulin glargine 30 Units Subcutaneous Daily Moriah Choi DO   insulin lispro 1-5 Units Subcutaneous TID AC Jessica Egan PA-C   insulin lispro 1-5 Units Subcutaneous HS Wanda Vernon PA-C   ketorolac 30 mg Intravenous Q6H PRN Samuel Mccauley MD   levalbuterol 1 25 mg Nebulization Q8H PRN Moriah Choi DO   lidocaine (PF) 10 mL Infiltration Once Marlyse Bevel, MD   LORazepam 1 mg Intravenous Q4H PRN Wanda Vernon PA-C   nicotine 1 patch Transdermal Daily Evgeny Ornelas PA-C   ondansetron 4 mg Intravenous Q4H PRN Francisca Phelps MD   pantoprazole 40 mg Oral Early Morning Evgeny Ornelas PA-C   sodium chloride 3 mL Nebulization Q8H PRN Moriah Choi DO   venlafaxine 37 5 mg Oral BID Evgeny Ornelas PA-C     Continuous Infusions:    PRN Meds:   acetaminophen    aluminum-magnesium hydroxide-simethicone    ketorolac    levalbuterol    LORazepam    ondansetron    sodium chloride      Physical exam:  Physical Exam  General appearance: alert and oriented, in no acute distress  Head: Normocephalic, without obvious abnormality, atraumatic  Eyes: conjunctivae/corneas clear  PERRL, EOM's intact  Fundi benign  Neck: no adenopathy, no carotid bruit, no JVD, supple, symmetrical, trachea midline and thyroid not enlarged, symmetric, no tenderness/mass/nodules  Lungs: clear to auscultation bilaterally  Heart: regular rate and rhythm, S1, S2 normal, no murmur, click, rub or gallop  Abdomen: soft, non-tender; bowel sounds normal; no masses,  no organomegaly  Extremities: extremities normal, warm and well-perfused; no cyanosis, clubbing, or edema  Pulses: 2+ and symmetric  Skin: bandage slight drainage on it but no blood   fluctuant area with minimal erythema   Neurologic: Grossly normal      VTE Pharmacologic Prophylaxis: Enoxaparin (Lovenox)  VTE Mechanical Prophylaxis: sequential compression device    Counseling / Coordination of Care  Total floor / unit time spent today 20 minutes     Current Length of Stay: 6 day(s)    Current Patient Status: Inpatient       Code Status: Level 1 - Full Code

## 2018-02-15 NOTE — PROCEDURES
Incision and drain  Date/Time: 2/15/2018 5:37 PM  Performed by: Malik West by: Joanna Huerta     Patient location:  Bedside  Consent:     Consent obtained:  Verbal    Consent given by:  Patient  Location:     Type:  Abscess    Location: right inner thigh  Pre-procedure details:     Skin preparation:  Betadine  Anesthesia (see MAR for exact dosages): Anesthesia method:  Local infiltration    Local anesthetic:  Lidocaine 1% w/o epi  Procedure details:     Complexity:  Intermediate    Needle aspiration: no      Incision types:  Elliptical    Scalpel blade:  11    Approach:  Open    Incision depth:  Subcutaneous    Drainage:  Purulent    Drainage amount: Moderate    Wound treatment:  Packing placed    Packing materials:  1/2 in gauze  Post-procedure details:     Patient tolerance of procedure:   Tolerated well, no immediate complications

## 2018-02-15 NOTE — PROCEDURES
Incision and drain  Date/Time: 2/15/2018 5:35 PM  Performed by: Hardeep Greenberg by: Celi Wong     Patient location:  Bedside  Consent:     Consent obtained:  Verbal    Consent given by:  Patient    Risks discussed:  Incomplete drainage  Location:     Type:  Abscess    Location: right buttock  Pre-procedure details:     Skin preparation:  Betadine  Anesthesia (see MAR for exact dosages): Anesthesia method:  Local infiltration    Local anesthetic:  Lidocaine 1% w/o epi  Procedure details:     Complexity:  Simple    Incision types:  Elliptical    Scalpel blade:  11    Approach:  Open    Incision depth:  Subcutaneous    Wound management:  Probed and deloculated    Drainage:  Purulent    Drainage amount:  Copious    Wound treatment:  Packing placed    Packing materials:  1/4 in gauze  Post-procedure details:     Patient tolerance of procedure:   Tolerated well, no immediate complications

## 2018-02-15 NOTE — PROGRESS NOTES
Progress Note - Infectious Disease   Holly Marcos 27 y o  female MRN: [id number]  Unit/Bed#: E5 -01 Encounter: [id number]      Impression/Plan:  1   Sepsis-POA   Tachycardia and leukocytosis   Appears all secondary to a right thigh cellulitis with abscess formation   No other clear sources appreciated   Consideration for the possibility of bacteremia   The patient appears to be tolerating the antibiotics without difficulty  Blood cultures are negative thus far   -discontinue vancomycin  -clindamycin 300 mg p o  Q 6 hours through 2018  -follow-up blood cultures  -follow-up CBC with diff and creatinine ordered for today  -no additional ID workup for now     2   Right thigh cellulitis/abscess-wound culture growing MRSA as expected  Patient has undergone incision and drainage and cultures have been sent  Isabell Galeazzi leg appears to have improved   -antibiotics as above  -local wound care  -close surgical follow-up     3   Heroin abuse-patient continues to actively inject heroin   Consideration for the possibility of viral complications  -follow-up HIV screen and hepatitis panel  -case management evaluation for homelessness and rehab     4   Diabetes mellitus-type 1 with hyperglycemia     5   Acute hypoxic respiratory failure-appears secondary to pulmonary edema   No other clear source appreciated  Much improved with diuresis and the patient is no longer requiring oxygen support  Normal ejection fraction on echocardiogram  -O2 support as needed  -diuresis as needed  -no additional ID workup for now    Antibiotics:  Vancomycin 6    Subjective:  Patient has no fever, chills, sweats; no nausea, vomiting, diarrhea; no cough, shortness of breath; decreased thigh pain  No new symptoms  She continues to have some drainage from the thigh      Objective:  Vitals:  HR:  [] 95  Resp:  [18] 18  BP: (104-116)/(61-72) 116/72  SpO2:  [96 %-97 %] 97 %  Temp (24hrs), Av 4 °F (36 9 °C), Min:98 2 °F (36 8 °C), Max:98 6 °F (37 °C)  Current: Temperature: 98 6 °F (37 °C)    Physical Exam:   General Appearance:  Alert, interactive, nontoxic, no acute distress  Throat: Oropharynx moist without lesions  Lungs:   Clear to auscultation bilaterally; no wheezes, rhonchi or rales; respirations unlabored   Heart:  RRR; no murmur, rub or gallop   Abdomen:   Soft, non-tender, non-distended, positive bowel sounds  Extremities: No clubbing, cyanosis  Right thigh with decreased induration erythema but some drainage persist   Skin: No new rashes or lesions  No draining wounds noted  Labs, Imaging, & Other studies:   All pertinent labs and imaging studies were personally reviewed    Results from last 7 days  Lab Units 02/13/18  0539 02/12/18  0651 02/10/18  0621   WBC Thousand/uL 15 29* 21 62* 27 63*   HEMOGLOBIN g/dL 8 1* 8 1* 8 7*   PLATELETS Thousands/uL 403* 399* 356       Results from last 7 days  Lab Units 02/13/18  0539 02/12/18  0651 02/10/18  0621 02/09/18  2306   SODIUM mmol/L 142 141 136 138   POTASSIUM mmol/L 3 7 3 6 3 9 3 8   CHLORIDE mmol/L 105 106 102 100   CO2 mmol/L 34* 26 25 28   ANION GAP mmol/L 3* 9 9 10   BUN mg/dL 5 7 11 11   CREATININE mg/dL 0 61 0 68 0 72 0 68   EGFR ml/min/1 73sq m 119 114 110 114   GLUCOSE RANDOM mg/dL 52* 99 243* 97   CALCIUM mg/dL 8 6 8 4 8 7 9 4   AST U/L  --   --   --  13   ALT U/L  --   --   --  13   ALK PHOS U/L  --   --   --  145*   TOTAL PROTEIN g/dL  --   --   --  8 2   BILIRUBIN TOTAL mg/dL  --   --   --  0 48       Results from last 7 days  Lab Units 02/11/18  1126 02/10/18  0935 02/10/18  0933   BLOOD CULTURE   --  No Growth After 4 Days  No Growth After 4 Days     GRAM STAIN RESULT  3+ Polys  3+ Gram positive cocci in clusters  --   --    WOUND CULTURE  3+ Growth of Methicillin Resistant Staphylococcus aureus*  --   --

## 2018-02-16 VITALS
HEART RATE: 98 BPM | HEIGHT: 67 IN | WEIGHT: 160 LBS | OXYGEN SATURATION: 96 % | DIASTOLIC BLOOD PRESSURE: 57 MMHG | TEMPERATURE: 98.4 F | BODY MASS INDEX: 25.11 KG/M2 | SYSTOLIC BLOOD PRESSURE: 109 MMHG | RESPIRATION RATE: 18 BRPM

## 2018-02-16 LAB
GLUCOSE SERPL-MCNC: 327 MG/DL (ref 65–140)
GLUCOSE SERPL-MCNC: 372 MG/DL (ref 65–140)

## 2018-02-16 PROCEDURE — 99232 SBSQ HOSP IP/OBS MODERATE 35: CPT | Performed by: INTERNAL MEDICINE

## 2018-02-16 PROCEDURE — 82948 REAGENT STRIP/BLOOD GLUCOSE: CPT

## 2018-02-16 PROCEDURE — 99239 HOSP IP/OBS DSCHRG MGMT >30: CPT | Performed by: INTERNAL MEDICINE

## 2018-02-16 RX ORDER — CLINDAMYCIN HYDROCHLORIDE 300 MG/1
300 CAPSULE ORAL EVERY 6 HOURS SCHEDULED
Qty: 16 CAPSULE | Refills: 0 | Status: SHIPPED | OUTPATIENT
Start: 2018-02-16 | End: 2018-02-20

## 2018-02-16 RX ORDER — NICOTINE 21 MG/24HR
1 PATCH, TRANSDERMAL 24 HOURS TRANSDERMAL DAILY
Qty: 28 PATCH | Refills: 0
Start: 2018-02-17 | End: 2018-02-27

## 2018-02-16 RX ORDER — KETOROLAC TROMETHAMINE 30 MG/ML
15 INJECTION, SOLUTION INTRAMUSCULAR; INTRAVENOUS EVERY 6 HOURS PRN
Status: DISCONTINUED | OUTPATIENT
Start: 2018-02-16 | End: 2018-02-16 | Stop reason: HOSPADM

## 2018-02-16 RX ORDER — IBUPROFEN 600 MG/1
600 TABLET ORAL DAILY PRN
Qty: 5 TABLET | Refills: 0 | Status: SHIPPED | OUTPATIENT
Start: 2018-02-16 | End: 2018-02-27

## 2018-02-16 RX ORDER — KETOROLAC TROMETHAMINE 30 MG/ML
15 INJECTION, SOLUTION INTRAMUSCULAR; INTRAVENOUS ONCE
Status: COMPLETED | OUTPATIENT
Start: 2018-02-16 | End: 2018-02-16

## 2018-02-16 RX ADMIN — KETOROLAC TROMETHAMINE 15 MG: 30 INJECTION, SOLUTION INTRAMUSCULAR at 12:38

## 2018-02-16 RX ADMIN — ACETAMINOPHEN 650 MG: 325 TABLET, FILM COATED ORAL at 15:05

## 2018-02-16 RX ADMIN — VENLAFAXINE 37.5 MG: 37.5 TABLET ORAL at 08:46

## 2018-02-16 RX ADMIN — INSULIN LISPRO 3 UNITS: 100 INJECTION, SOLUTION INTRAVENOUS; SUBCUTANEOUS at 11:51

## 2018-02-16 RX ADMIN — LORAZEPAM 1 MG: 2 INJECTION INTRAMUSCULAR; INTRAVENOUS at 12:16

## 2018-02-16 RX ADMIN — CLINDAMYCIN HYDROCHLORIDE 300 MG: 150 CAPSULE ORAL at 06:49

## 2018-02-16 RX ADMIN — ONDANSETRON 4 MG: 2 INJECTION INTRAMUSCULAR; INTRAVENOUS at 07:39

## 2018-02-16 RX ADMIN — IBUPROFEN 200 MG: 200 TABLET, FILM COATED ORAL at 06:59

## 2018-02-16 RX ADMIN — IBUPROFEN 200 MG: 200 TABLET, FILM COATED ORAL at 15:05

## 2018-02-16 RX ADMIN — LORAZEPAM 1 MG: 2 INJECTION INTRAMUSCULAR; INTRAVENOUS at 06:59

## 2018-02-16 RX ADMIN — INSULIN GLARGINE 30 UNITS: 100 INJECTION, SOLUTION SUBCUTANEOUS at 08:48

## 2018-02-16 RX ADMIN — PANTOPRAZOLE SODIUM 40 MG: 40 TABLET, DELAYED RELEASE ORAL at 06:49

## 2018-02-16 RX ADMIN — ACETAMINOPHEN 650 MG: 325 TABLET, FILM COATED ORAL at 06:59

## 2018-02-16 RX ADMIN — GABAPENTIN 100 MG: 100 CAPSULE ORAL at 08:46

## 2018-02-16 RX ADMIN — CLINDAMYCIN HYDROCHLORIDE 300 MG: 150 CAPSULE ORAL at 11:51

## 2018-02-16 RX ADMIN — KETOROLAC TROMETHAMINE 15 MG: 30 INJECTION, SOLUTION INTRAMUSCULAR at 09:03

## 2018-02-16 RX ADMIN — ENOXAPARIN SODIUM 40 MG: 40 INJECTION SUBCUTANEOUS at 08:45

## 2018-02-16 RX ADMIN — INSULIN LISPRO 5 UNITS: 100 INJECTION, SOLUTION INTRAVENOUS; SUBCUTANEOUS at 08:49

## 2018-02-16 NOTE — DISCHARGE INSTRUCTIONS
Wound care:    Monitor wounds daily  Wash daily with soap and water  Continue packing changes with 1/2 inch gauze daily  Change outer dressing daily and if saturated  Follow-up with Dr Alem Garsia in the [facility] in 1 week after discharge  Call for any increasing pain, swelling, foul smelling drainage, or fevers

## 2018-02-16 NOTE — SOCIAL WORK
Scheduled appt for patient at wound care center for 2/19 at 3 pm  Discussed with patient; boyfriend states he has watched the RN do dressing changes and is able to do them  Asked RN to provide dressing change and packing to patient  Patient stated she is not able to go home "because my family won't let me," so continues to be here staying at the [facility] center  Taxi voucher provided for patient and boyfriend

## 2018-02-16 NOTE — PLAN OF CARE
DISCHARGE PLANNING     Discharge to home or other facility with appropriate resources Adequate for Discharge        DISCHARGE PLANNING - CARE MANAGEMENT     Discharge to post-acute care or home with appropriate resources Adequate for Discharge        INFECTION - ADULT     Absence or prevention of progression during hospitalization Adequate for Discharge     Absence of fever/infection during neutropenic period Adequate for Discharge        Knowledge Deficit     Patient/family/caregiver demonstrates understanding of disease process, treatment plan, medications, and discharge instructions Adequate for Discharge        Nutrition/Hydration-ADULT     Nutrient/Hydration intake appropriate for improving, restoring or maintaining nutritional needs Adequate for Discharge        PAIN - ADULT     Verbalizes/displays adequate comfort level or baseline comfort level Adequate for Discharge        Potential for Falls     Patient will remain free of falls Adequate for Discharge        SAFETY ADULT     Patient will remain free of falls Adequate for Discharge     Maintain or return to baseline ADL function Adequate for Discharge     Maintain or return mobility status to optimal level Adequate for Discharge

## 2018-02-16 NOTE — PROGRESS NOTES
Progress Note - Infectious Disease   Dana Hill 27 y o  female MRN: [id number]  Unit/Bed#: E5 -01 Encounter: [id number]      Impression/Plan:  1   Sepsis-POA   Tachycardia and leukocytosis   Appears all secondary to a right thigh cellulitis with abscess formation   No other clear sources appreciated   Consideration for the possibility of bacteremia   The patient appears to be tolerating the antibiotics without difficulty  Blood cultures are negative thus far   -continue clindamycin through 2018  -follow-up CBC with diff and creatinine as needed  -no additional ID workup for now     2   Right thigh cellulitis/abscess-wound culture growing MRSA as expected  Patient has undergone incision and drainage and and now found to have 2 additional abscesses that have undergone I and D   The leg appears to have improved   -antibiotics as above  -local wound care  -close surgical follow-up     3   Heroin abuse-patient continues to actively inject heroin   Consideration for the possibility of viral complications  -follow-up HIV screen and hepatitis panel  -case management evaluation for homelessness and rehab     4   Diabetes mellitus-type 1 with hyperglycemia     5   Acute hypoxic respiratory failure-appears secondary to pulmonary edema   No other clear source appreciated  Much improved with diuresis and the patient is no longer requiring oxygen support  Normal ejection fraction on echocardiogram  -O2 support as needed  -diuresis as needed  -no additional ID workup for now    Will see the patient again 2018 if not discharged  Please call if questions  Antibiotics:  Clindamycin 2  Antibiotics 7    Subjective:  Patient has no fever, chills, sweats; no nausea, vomiting, diarrhea; no cough, shortness of breath; still with some right leg pain  No new symptoms      Objective:  Vitals:  HR:  [95] 95  Resp:  [18] 18  BP: (101-102)/(58-62) 102/60  SpO2:  [97 %-98 %] 97 %  Temp (24hrs), Av 4 °F (36 9 °C), Min:97 4 °F (36 3 °C), Max:99 6 °F (37 6 °C)  Current: Temperature: (!) 97 4 °F (36 3 °C)    Physical Exam:   General Appearance:  Alert, interactive, nontoxic, no acute distress  Throat: Oropharynx moist without lesions  Lungs:   Clear to auscultation bilaterally; no wheezes, rhonchi or rales; respirations unlabored   Heart:  RRR; no murmur, rub or gallop   Abdomen:   Soft, non-tender, non-distended, positive bowel sounds  Extremities: No clubbing, cyanosis  Right posterior thigh with 3 I&D sites well packed  Decrease induration erythema in the region  Skin: No new rashes or lesions  No draining wounds noted  Labs, Imaging, & Other studies:   All pertinent labs and imaging studies were personally reviewed    Results from last 7 days  Lab Units 02/13/18  0539 02/12/18  0651 02/10/18  0621   WBC Thousand/uL 15 29* 21 62* 27 63*   HEMOGLOBIN g/dL 8 1* 8 1* 8 7*   PLATELETS Thousands/uL 403* 399* 356       Results from last 7 days  Lab Units 02/15/18  1826 02/13/18  0539 02/12/18  0651  02/09/18  2306   SODIUM mmol/L 135* 142 141  < > 138   POTASSIUM mmol/L 4 4 3 7 3 6  < > 3 8   CHLORIDE mmol/L 96* 105 106  < > 100   CO2 mmol/L 32 34* 26  < > 28   ANION GAP mmol/L 7 3* 9  < > 10   BUN mg/dL 18 5 7  < > 11   CREATININE mg/dL 0 83 0 61 0 68  < > 0 68   EGFR ml/min/1 73sq m 92 119 114  < > 114   GLUCOSE RANDOM mg/dL 319* 52* 99  < > 97   CALCIUM mg/dL 9 3 8 6 8 4  < > 9 4   AST U/L  --   --   --   --  13   ALT U/L  --   --   --   --  13   ALK PHOS U/L  --   --   --   --  145*   TOTAL PROTEIN g/dL  --   --   --   --  8 2   BILIRUBIN TOTAL mg/dL  --   --   --   --  0 48   < > = values in this interval not displayed  Results from last 7 days  Lab Units 02/11/18  1126 02/10/18  0935 02/10/18  0933   BLOOD CULTURE   --  No Growth After 5 Days  No Growth After 5 Days     GRAM STAIN RESULT  3+ Polys  3+ Gram positive cocci in clusters  --   --    WOUND CULTURE  3+ Growth of Methicillin Resistant Staphylococcus aureus*  --   --

## 2018-02-16 NOTE — PROGRESS NOTES
Progress Note - General Surgery   Ignacio Gorman 27 y o  female MRN: [id number]  Unit/Bed#: E5 -01 Encounter: [id number]    Assessment:  Right thigh abscesses x2, and buttocks abscess s/p bedside I and D  Sepsis POA-improving  MRSA positive  Type 1 diabetes  Hep C  IV heroin abuse    Plan:  Local wound care  Wounds repacked and redressed at time of visit  Medical and infectious disease management  Pain control as needed  Follow-up in [address] [facility] after discharge    Subjective/Objective   Chief Complaint:  Pain    Subjective:  Complaining of pain at site of incision and drainage    Objective:     Blood pressure 98/60, pulse 95, temperature (!) 97 4 °F (36 3 °C), temperature source Temporal, resp  rate 18, height 5' 7" (1 702 m), weight 72 6 kg (160 lb), last menstrual period 01/18/2018, SpO2 97 %  ,Body mass index is 25 06 kg/m²  Intake/Output Summary (Last 24 hours) at 02/16/18 1234  Last data filed at 02/15/18 1300   Gross per 24 hour   Intake              400 ml   Output                0 ml   Net              400 ml       Invasive Devices     Peripheral Intravenous Line            Peripheral IV 02/09/18 Left;Upper Arm 6 days                Physical Exam: General appearance: alert and oriented, in no acute distress  Lungs: clear to auscultation bilaterally  Heart: regular rate and rhythm, S1, S2 normal, no murmur, click, rub or gallop  Skin: Right thigh abscesses x2 open with packing in place and draining purulent bloody drainage  Buttocks abscess open and draining  Minior surrounding erythema and induration    Lab, Imaging and other studies:I have personally reviewed pertinent lab results      VTE Pharmacologic Prophylaxis: Sequential compression device (Venodyne)   VTE Mechanical Prophylaxis: sequential compression device

## 2018-02-16 NOTE — NURSING NOTE
IV removed prior to discharge  Taxi called and this nurse told it would be more than one hour and pt/sig other left via walking to shelter as they reported they could not wait   Left with belongings and AVS/Rx

## 2018-02-16 NOTE — DISCHARGE SUMMARY
Discharge Summary - [facility]    Patient Information: Aaliyah Montague 27 y o  female MRN: [id number]  Unit/Bed#: E5 -01 Encounter: [id number]    Discharging Physician / Practitioner: Lavonne Reyes DO  PCP: No primary care provider on file  Admission Date: 2/9/2018  Discharge Date: 02/16/18    Disposition:     Home     Reason for Admission: sepsis due to cellulitis    Discharge Diagnoses:     Principal Problem:    Sepsis due to cellulitis [facility])  Active Problems:    Hypertension    GERD (gastroesophageal reflux disease)    Heroin abuse    Cellulitis and abscess of right lower extremity    Type 1 diabetes mellitus with hyperglycemia (HCC)  Resolved Problems:    * No resolved hospital problems  *      Consultations During Hospital Stay:  · ID   · General surgery    Procedures Performed:     · Incision and drainage of abscess x2    Significant Findings / Test Results:  Xr Chest Pa & Lateral  Result Date: 2/13/2018  Impression: Pulmonary edema  Moderate bilateral pleural effusions and associated bibasilar atelectasis  Cta Chest Pe Study  Result Date: 2/12/2018  Impression: 1  Diffuse reticulonodular interstitial thickening could relate to pulmonary edema  Follow-up to resolution with 2 view chest can be performed  2   Bibasilar consolidation which could relate to atelectasis and/or pneumonia  Follow-up to resolution with 2 view chest can be performed  3   No filling defect to suggest pulmonary embolism  4   Enlarged pulmonary artery suggesting pulmonary hypertension  Incidental Findings:   · none    Test Results Pending at Discharge (will require follow up):   · none     Outpatient Tests Requested:  none    Hospital Course:     Aaliyah Montague is a 27 y o  female patient who originally presented to the hospital on 2/9/2018 due to Sepsis due to cellulitis and absess of right lower ext-likely related to known heroin abuse and poor self-care due to MRSA   She was evaluated by surgery and had an I and D of multiple areas of the abscess  She was assessed by ID and treated with vanco  She was switched to clindamycin to complete course  She will follow up with the wound clinic  Due to her history of heroin abuse she was not sent home with narcotics for pain  Pt did have acute hypoxic respiratory failure due to pulmonary edema due to tachycardia and IVF  Echo reviewed and no signs of chf  Pt was treated with IV lasix and is sating well on room air at discharge  No further lasix was needed  Pt is type 1 diabetes mellitus with hyperglycemiawith poor compliance due to homelessness and drug addiction  Pt has had hypo and hyperglycemia in hospital  She may resume her previous dose of lantus  She does have heroin abuse and was referred to the program    She is homeless and will go back to the shelter      Discharge Day Visit / Exam:     * Please refer to separate progress note for these details *    Discharge instructions/Information to patient and family:   See after visit summary for information provided to patient and family  Provisions for Follow-Up Care:  See after visit summary for information related to follow-up care and any pertinent home health orders  Discharge Statement:  I spent 34 minutes discharging the patient  This time was spent on the day of discharge  I had direct contact with the patient on the day of discharge  Greater than 50% of the total time was spent examining patient, answering all patient questions, arranging and discussing plan of care with patient as well as directly providing post-discharge instructions  Additional time then spent on discharge activities  Discharge Medications:  See after visit summary for reconciled discharge medications provided to patient and family

## 2018-02-16 NOTE — PROGRESS NOTES
[facility] Progress Note  Patient: Gregoria Quach 27 y o  female   MRN: [id number]  PCP: No primary care provider on file  Unit/Bed#: E5 -01 Encounter: [id number]  Date Of Visit: 02/16/18      Assessment/plan  Principal Problem:   1 Sepsis due to cellulitis and absess of right lower ext-likely related to known heroin abuse and poor self-care   due to MRSA  S/p I and D x2  Appreciate ID recommendations  Pt changed to clindamycin 300mg q6 through 2/19  Continue wound packing  Pt has a follow up appointment on Monday with wound clinic     2  Acute hypoxic respiratory failure due to pulmonary edema due to tachycardia and IVF  Echo reviewed  Pt now on room air  Will d/c lasix  No further lasix needed       Active Problems:  1  Type 1 diabetes mellitus with hyperglycemia-with poor compliance due to homelessness and drug addiction  Pt has had hypo and hyperglycemia in hospital  She may resume her previous dose of lantus       2  Heroin abuse-  Refer to host program  Monitor for withdrawal     3  GERD-continue Protonix daily     4  Homelessness-The patient lives in [address] [place] [address] Daniel plans to move back there when she is able to afford the trip back     dispo- d/c to the homeless shelter    Subjective:   Pt seen and examined  She wants to go home  No f/c no cp no sob no n/v/d no abd pain  She has been walking the hallways    Objective:     Vitals: Blood pressure 109/57, pulse 98, temperature 98 4 °F (36 9 °C), temperature source Temporal, resp  rate 18, height 5' 7" (1 702 m), weight 72 6 kg (160 lb), last menstrual period 01/18/2018, SpO2 96 %  ,Body mass index is 25 06 kg/m²      Lab, Imaging and other studies:    Results from last 7 days  Lab Units 02/13/18  0539   WBC Thousand/uL 15 29*   HEMOGLOBIN g/dL 8 1*   HEMATOCRIT % 26 8*   PLATELETS Thousands/uL 403*       Results from last 7 days  Lab Units 02/15/18  1826  02/09/18  2306   SODIUM mmol/L 135*  < > 138   POTASSIUM mmol/L 4 4  < > 3 8 CHLORIDE mmol/L 96*  < > 100   CO2 mmol/L 32  < > 28   BUN mg/dL 18  < > 11   CREATININE mg/dL 0 83  < > 0 68   CALCIUM mg/dL 9 3  < > 9 4   TOTAL PROTEIN g/dL  --   --  8 2   BILIRUBIN TOTAL mg/dL  --   --  0 48   ALK PHOS U/L  --   --  145*   ALT U/L  --   --  13   AST U/L  --   --  13   GLUCOSE RANDOM mg/dL 319*  < > 97   < > = values in this interval not displayed  Lab Results   Component Value Date    BLOODCX No Growth After 5 Days  02/10/2018    BLOODCX No Growth After 5 Days  02/10/2018    BLOODCX No Growth After 5 Days   01/27/2018    WOUNDCULT (A) 02/11/2018     3+ Growth of Methicillin Resistant Staphylococcus aureus     Scheduled Meds:   Current Facility-Administered Medications:  acetaminophen 650 mg Oral Q6H PRN Loshaneie Rosass, PA-KIRSTY   aluminum-magnesium hydroxide-simethicone 30 mL Oral Q6H PRN Radha Naranjo, GIRISH   amLODIPine 10 mg Oral Daily Radha Naranjo, GIRISH   clindamycin 300 mg Oral Q6H [address] Janette Lemos MD   enoxaparin 40 mg Subcutaneous Daily Radha Naranjo PA-C   gabapentin 100 mg Oral TID Radha Naranjo, GIRISH   ibuprofen 200 mg Oral Q6H PRN Moriah Ambron, DO   insulin glargine 30 Units Subcutaneous Daily Moriah Ambron, DO   insulin lispro 1-5 Units Subcutaneous TID AC Radha Naranjo, PA-KIRSTY   insulin lispro 1-5 Units Subcutaneous HS Radha Naranjo PA-C   ketorolac 15 mg Intravenous Q6H PRN Moriah Ambron, DO   lidocaine (PF) 10 mL Infiltration Once Saroj Iraheta MD   LORazepam 1 mg Intravenous Q4H PRN Radha Naranjo PA-C   nicotine 1 patch Transdermal Daily Radha Naranjo PA-C   ondansetron 4 mg Intravenous Q4H PRN Tim Ruby MD   pantoprazole 40 mg Oral Early Morning Radha Naranjo PA-C   venlafaxine 37 5 mg Oral BID Radha Naranjo PA-C     Continuous Infusions:    PRN Meds:   acetaminophen    aluminum-magnesium hydroxide-simethicone    ibuprofen    ketorolac    LORazepam    ondansetron      Physical exam:  Physical Exam  General appearance: alert and oriented, in no acute distress  Head: Normocephalic, without obvious abnormality, atraumatic  Eyes: conjunctivae/corneas clear  PERRL, EOM's intact  Fundi benign    Neck: no adenopathy, no carotid bruit, no JVD, supple, symmetrical, trachea midline and thyroid not enlarged, symmetric, no tenderness/mass/nodules  Lungs: clear to auscultation bilaterally  Heart: regular rate and rhythm, S1, S2 normal, no murmur, click, rub or gallop  Abdomen: soft, non-tender; bowel sounds normal; no masses,  no organomegaly  Extremities: extremities normal, warm and well-perfused; no cyanosis, clubbing, or edema  Pulses: 2+ and symmetric  Skin: wounds repacked by surgery  Neurologic: Grossly normal

## 2018-02-16 NOTE — NURSING NOTE
Patients refused  IV change and prior blood work, however IV is six days old and was informed that it was due to be change per hospital policy  Will continue to monitor patient

## 2018-02-16 NOTE — PLAN OF CARE
Problem: DISCHARGE PLANNING - CARE MANAGEMENT  Goal: Discharge to post-acute care or home with appropriate resources  INTERVENTIONS:  - Conduct assessment to determine patient/family and health care team treatment goals, and need for post-acute services based on payer coverage, community resources, and patient preferences, and barriers to discharge  - Address psychosocial, clinical, and financial barriers to discharge as identified in assessment in conjunction with the patient/family and health care team  - Arrange appropriate level of post-acute services according to patient's   needs and preference and payer coverage in collaboration with the physician and health care team  - Communicate with and update the patient/family, physician, and health care team regarding progress on the discharge plan  - Arrange appropriate transportation to post-acute venues   Outcome: Adequate for Discharge  Patient set up with wound care center appt for 2/19/18 at 3 pm  Taxi voucher provided to RN for patient and boyfriend to have transport to [facility] station

## 2018-02-18 ENCOUNTER — HOSPITAL ENCOUNTER (EMERGENCY)
Facility: HOSPITAL | Age: 33
Discharge: HOME | End: 2018-02-18
Attending: EMERGENCY MEDICINE
Payer: MEDICARE

## 2018-02-18 VITALS
WEIGHT: 160 LBS | OXYGEN SATURATION: 100 % | TEMPERATURE: 97.8 F | BODY MASS INDEX: 25.06 KG/M2 | RESPIRATION RATE: 16 BRPM | DIASTOLIC BLOOD PRESSURE: 65 MMHG | HEART RATE: 75 BPM | SYSTOLIC BLOOD PRESSURE: 124 MMHG

## 2018-02-18 DIAGNOSIS — L02.415 CELLULITIS AND ABSCESS OF RIGHT LOWER EXTREMITY: Primary | ICD-10-CM

## 2018-02-18 DIAGNOSIS — E10.9 TYPE 1 DIABETES (HCC): ICD-10-CM

## 2018-02-18 DIAGNOSIS — L03.115 CELLULITIS AND ABSCESS OF RIGHT LOWER EXTREMITY: Primary | ICD-10-CM

## 2018-02-18 DIAGNOSIS — D64.9 ANEMIA: ICD-10-CM

## 2018-02-18 LAB
ANION GAP SERPL CALCULATED.3IONS-SCNC: 7 MMOL/L (ref 4–13)
ANISOCYTOSIS BLD QL SMEAR: PRESENT
BASOPHILS # BLD MANUAL: 0.25 THOUSAND/UL (ref 0–0.1)
BASOPHILS NFR MAR MANUAL: 2 % (ref 0–1)
BUN SERPL-MCNC: 14 MG/DL (ref 5–25)
CALCIUM SERPL-MCNC: 8.9 MG/DL (ref 8.3–10.1)
CHLORIDE SERPL-SCNC: 99 MMOL/L (ref 100–108)
CO2 SERPL-SCNC: 27 MMOL/L (ref 21–32)
CREAT SERPL-MCNC: 0.56 MG/DL (ref 0.6–1.3)
EOSINOPHIL # BLD MANUAL: 0.25 THOUSAND/UL (ref 0–0.4)
EOSINOPHIL NFR BLD MANUAL: 2 % (ref 0–6)
ERYTHROCYTE [DISTWIDTH] IN BLOOD BY AUTOMATED COUNT: 16.6 % (ref 11.6–15.1)
EXT PREG TEST URINE: NORMAL
GFR SERPL CREATININE-BSD FRML MDRD: 122 ML/MIN/1.73SQ M
GLUCOSE SERPL-MCNC: 318 MG/DL (ref 65–140)
HCT VFR BLD AUTO: 28.2 % (ref 34.8–46.1)
HGB BLD-MCNC: 8.8 G/DL (ref 11.5–15.4)
LYMPHOCYTES # BLD AUTO: 34 % (ref 14–44)
LYMPHOCYTES # BLD AUTO: 4.27 THOUSAND/UL (ref 0.6–4.47)
MCH RBC QN AUTO: 26.1 PG (ref 26.8–34.3)
MCHC RBC AUTO-ENTMCNC: 31.2 G/DL (ref 31.4–37.4)
MCV RBC AUTO: 84 FL (ref 82–98)
MONOCYTES # BLD AUTO: 0.13 THOUSAND/UL (ref 0–1.22)
MONOCYTES NFR BLD: 1 % (ref 4–12)
NEUTROPHILS # BLD MANUAL: 7.66 THOUSAND/UL (ref 1.85–7.62)
NEUTS BAND NFR BLD MANUAL: 5 % (ref 0–8)
NEUTS SEG NFR BLD AUTO: 56 % (ref 43–75)
NRBC BLD AUTO-RTO: 0 /100 WBCS
PLATELET # BLD AUTO: 501 THOUSANDS/UL (ref 149–390)
PLATELET BLD QL SMEAR: ABNORMAL
PMV BLD AUTO: 9.1 FL (ref 8.9–12.7)
POTASSIUM SERPL-SCNC: 5 MMOL/L (ref 3.5–5.3)
RBC # BLD AUTO: 3.37 MILLION/UL (ref 3.81–5.12)
SODIUM SERPL-SCNC: 133 MMOL/L (ref 136–145)
TOTAL CELLS COUNTED SPEC: 100
WBC # BLD AUTO: 12.56 THOUSAND/UL (ref 4.31–10.16)

## 2018-02-18 PROCEDURE — 36415 COLL VENOUS BLD VENIPUNCTURE: CPT | Performed by: EMERGENCY MEDICINE

## 2018-02-18 PROCEDURE — 80048 BASIC METABOLIC PNL TOTAL CA: CPT | Performed by: EMERGENCY MEDICINE

## 2018-02-18 PROCEDURE — 99283 EMERGENCY DEPT VISIT LOW MDM: CPT

## 2018-02-18 PROCEDURE — 81025 URINE PREGNANCY TEST: CPT | Performed by: EMERGENCY MEDICINE

## 2018-02-18 PROCEDURE — 96372 THER/PROPH/DIAG INJ SC/IM: CPT

## 2018-02-18 PROCEDURE — 85027 COMPLETE CBC AUTOMATED: CPT | Performed by: EMERGENCY MEDICINE

## 2018-02-18 PROCEDURE — 85007 BL SMEAR W/DIFF WBC COUNT: CPT | Performed by: EMERGENCY MEDICINE

## 2018-02-18 RX ORDER — KETOROLAC TROMETHAMINE 30 MG/ML
30 INJECTION, SOLUTION INTRAMUSCULAR; INTRAVENOUS ONCE
Status: COMPLETED | OUTPATIENT
Start: 2018-02-18 | End: 2018-02-18

## 2018-02-18 RX ORDER — CLINDAMYCIN HYDROCHLORIDE 150 MG/1
450 CAPSULE ORAL ONCE
Status: COMPLETED | OUTPATIENT
Start: 2018-02-18 | End: 2018-02-18

## 2018-02-18 RX ORDER — KETOROLAC TROMETHAMINE 30 MG/ML
15 INJECTION, SOLUTION INTRAMUSCULAR; INTRAVENOUS ONCE
Status: DISCONTINUED | OUTPATIENT
Start: 2018-02-18 | End: 2018-02-18

## 2018-02-18 RX ADMIN — CLINDAMYCIN HYDROCHLORIDE 450 MG: 150 CAPSULE ORAL at 11:27

## 2018-02-18 RX ADMIN — KETOROLAC TROMETHAMINE 30 MG: 30 INJECTION, SOLUTION INTRAMUSCULAR at 10:37

## 2018-02-18 NOTE — DISCHARGE INSTRUCTIONS
Anemia   WHAT YOU NEED TO KNOW:   Anemia is a low number of red blood cells or a low amount of hemoglobin in your red blood cells  Hemoglobin is a protein that helps carry oxygen throughout your body  Red blood cells use iron to create hemoglobin  Anemia may develop if your body does not have enough iron  It may also develop if your body does not make enough red blood cells or they die faster than your body can make them  DISCHARGE INSTRUCTIONS:   Call 911 or have someone call 911 for any of the following:   · You lose consciousness  · You have severe chest pain  Return to the emergency department if:   · You have dark or bloody bowel movements  Contact your healthcare provider if:   · Your symptoms are worse, even after treatment  · You have questions or concerns about your condition or care  Medicines:   · Iron or folic acid supplements  help increase your red blood cell and hemoglobin levels  · Vitamin B12 injections  may help boost your red blood cell level and decrease your symptoms  Ask your healthcare provider how to inject B12  · Take your medicine as directed  Contact your healthcare provider if you think your medicine is not helping or if you have side effects  Tell him of her if you are allergic to any medicine  Keep a list of the medicines, vitamins, and herbs you take  Include the amounts, and when and why you take them  Bring the list or the pill bottles to follow-up visits  Carry your medicine list with you in case of an emergency  Prevent anemia:  Eat healthy foods rich in iron and vitamin C  Nuts, meat, dark leafy green vegetables, and beans are high in iron and protein  Vitamin C helps your body absorb iron  Foods rich in vitamin C include oranges and other citrus fruits  Ask your healthcare provider for a list of other foods that are high in iron or vitamin C  Ask if you need to be on a special diet     Follow up with your healthcare provider as directed:  Write down your questions so you remember to ask them during your visits  © 2017 [address] Information is for End User's use only and may not be sold, redistributed or otherwise used for commercial purposes  All illustrations and images included in CareNotes® are the copyrighted property of [employer]  or [address]  The above information is an  only  It is not intended as medical advice for individual conditions or treatments  Talk to your doctor, nurse or pharmacist before following any medical regimen to see if it is safe and effective for you  Abscess   WHAT YOU NEED TO KNOW:   A warm compress may help your abscess drain  Your healthcare provider may make a cut in the abscess so it can drain  You may need surgery to remove an abscess that is on your hands or buttocks  DISCHARGE INSTRUCTIONS:   Return to the emergency department if:   · The area around your abscess becomes very painful, warm, or has red streaks  · You have a fever and chills  · Your heart is beating faster than usual      · You feel faint or confused  Contact your healthcare provider if:   · Your abscess gets bigger or does not get better  · Your abscess returns  · You have questions or concerns about your condition or care  Medicines: You may  need any of the following:  · Antibiotics  help treat a bacterial infection  · Acetaminophen  decreases pain and fever  It is available without a doctor's order  Ask how much to take and how often to take it  Follow directions  Acetaminophen can cause liver damage if not taken correctly  · NSAIDs , such as ibuprofen, help decrease swelling, pain, and fever  This medicine is available with or without a doctor's order  NSAIDs can cause stomach bleeding or kidney problems in certain people  If you take blood thinner medicine, always ask your healthcare provider if NSAIDs are safe for you   Always read the medicine label and follow directions  · Take your medicine as directed  Contact your healthcare provider if you think your medicine is not helping or if you have side effects  Tell him or her if you are allergic to any medicine  Keep a list of the medicines, vitamins, and herbs you take  Include the amounts, and when and why you take them  Bring the list or the pill bottles to follow-up visits  Carry your medicine list with you in case of an emergency  Self-care:   · Apply a warm compress to your abscess  This will help it open and drain  Wet a washcloth in warm, but not hot, water  Apply the compress for 10 minutes  Repeat this 4 times each day  Do not  press on an abscess or try to open it with a needle  You may push the bacteria deeper or into your blood  · Do not share your clothes, towels, or sheets with anyone  This can spread the infection to others  · Wash your hands often  This can help prevent the spread of germs  Use soap and water or an alcohol-based hand rub  Care for your wound after it is drained:   · Care for your wound as directed  If your healthcare provider says it is okay, carefully remove the bandage and gauze packing  You may need to soak the gauze to get it out of your wound  Clean your wound and the area around it as directed  Dry the area and put on new, clean bandages  Change your bandages when they get wet or dirty  · Ask your healthcare provider how to change the gauze in your wound  Keep track of how many pieces of gauze are placed inside the wound  Do not put too much packing in the wound  Do not pack the gauze too tightly in your wound  Follow up with your healthcare provider in 1 to 3 days: You may need to have your packing removed or your bandage changed  Write down your questions so you remember to ask them during your visits  © 2017 [address] Information is for End User's use only and may not be sold, redistributed or otherwise used for commercial purposes   All illustrations and images included in CareNotes® are the copyrighted property of A D A M [employer] Inc  or [employer] Phillips  The above information is an  only  It is not intended as medical advice for individual conditions or treatments  Talk to your doctor, nurse or pharmacist before following any medical regimen to see if it is safe and effective for you

## 2018-02-18 NOTE — ED PROVIDER NOTES
History  Chief Complaint   Patient presents with    Abscess     Patient recently admitted for abscess on R leg  Patient D/C'd on Friday after drainage of absecess  Patient states she has gotten 2 more around same area  Packing came out of absess and patient states that there is yellow drainge coming out as well  Patient reports subjective fevers       History provided by:  Patient  Abscess   Location:  Pelvis  Pelvic abscess location:  R buttock  Abscess quality: draining, induration and painful    Abscess quality: no fluctuance    Red streaking: no    Progression:  Improving  Pain details:     Quality:  Throbbing and aching    Severity:  Moderate    Timing:  Constant    Progression:  Waxing and waning  Chronicity:  New  Context: diabetes and injected drug use    Relieved by:  Nothing  Exacerbated by: palpation  Associated symptoms: fever ( subjective)        Prior to Admission Medications   Prescriptions Last Dose Informant Patient Reported? Taking?    amLODIPine (NORVASC) 10 mg tablet   Yes No   Sig: Take 10 mg by mouth daily   clindamycin (CLEOCIN) 300 MG capsule   No No   Sig: Take 1 capsule (300 mg total) by mouth every 6 (six) hours for 4 days   gabapentin (NEURONTIN) 100 mg capsule   Yes No   Sig: Take 100 mg by mouth 3 (three) times a day   ibuprofen (MOTRIN) 600 mg tablet   No No   Sig: Take 1 tablet (600 mg total) by mouth daily as needed for moderate pain (with packing changes)   insulin glargine (LANTUS) 100 units/mL subcutaneous injection   No No   Sig: Inject 50 Units under the skin daily   insulin lispro (HumaLOG) 100 units/mL injection   No No   Sig: Inject 8 Units under the skin 3 (three) times a day with meals   nicotine (NICODERM CQ) 14 mg/24hr TD 24 hr patch   No No   Sig: Place 1 patch on the skin daily   pantoprazole (PROTONIX) 40 mg tablet   Yes No   Sig: Take 40 mg by mouth daily   venlafaxine (EFFEXOR) 37 5 mg tablet   Yes No   Sig: Take 37 5 mg by mouth 2 (two) times a day Facility-Administered Medications: None       Past Medical History:   Diagnosis Date    Diabetes mellitus ([facility] )     GERD (gastroesophageal reflux disease)     Hepatitis C     Hypertension     [facility]     last seizure Nov 2017       Past Surgical History:   Procedure Laterality Date    BACK SURGERY      lumbar     EYE SURGERY      bilateral    INCISION AND DRAINAGE ABSCESS / HEMATOMA OF BURSA / Grace Means / THIGH Right        History reviewed  No pertinent family history  I have reviewed and agree with the history as documented  Social History   Substance Use Topics    Smoking status: Light Tobacco Smoker    Smokeless tobacco: Never Used    Alcohol use No        Review of Systems   Constitutional: Positive for fever ( subjective)  Skin: Positive for color change and wound  All other systems reviewed and are negative  Physical Exam  ED Triage Vitals [02/18/18 0926]   Temperature Pulse Respirations Blood Pressure SpO2   97 8 °F (36 6 °C) 104 18 128/75 100 %      Temp Source Heart Rate Source Patient Position - Orthostatic VS BP Location FiO2 (%)   Oral Monitor Sitting Left arm --      Pain Score       Worst Possible Pain           Orthostatic Vital Signs  Vitals:    02/18/18 0926   BP: 128/75   Pulse: 104   Patient Position - Orthostatic VS: Sitting       Physical Exam   Constitutional: She is oriented to person, place, and time  She appears well-developed and well-nourished  No distress  HENT:   Head: Normocephalic and atraumatic  Right Ear: External ear normal    Left Ear: External ear normal    Eyes: Conjunctivae and EOM are normal  Pupils are equal, round, and reactive to light  No scleral icterus  Neck: Normal range of motion  Cardiovascular: Normal rate, regular rhythm and normal heart sounds  Pulmonary/Chest: Effort normal and breath sounds normal  No respiratory distress  Abdominal: Soft  Bowel sounds are normal  There is no tenderness   There is no rebound and no guarding  Musculoskeletal: Normal range of motion  She exhibits no edema  Neurological: She is alert and oriented to person, place, and time  Skin: Skin is warm and dry  Psychiatric: She has a normal mood and affect  Nursing note and vitals reviewed  ED Medications  Medications   clindamycin (CLEOCIN) capsule 450 mg (not administered)   ketorolac (TORADOL) injection 30 mg (30 mg Intramuscular Given 2/18/18 1037)       Diagnostic Studies  Results Reviewed     Procedure Component Value Units Date/Time    Basic metabolic panel [[id number]]  (Abnormal) Collected:  02/18/18 1050    Lab Status:  Final result Specimen:  Blood from Arm, Right Updated:  02/18/18 1111     Sodium 133 (L) mmol/L      Potassium 5 0 mmol/L      Chloride 99 (L) mmol/L      CO2 27 mmol/L      Anion Gap 7 mmol/L      BUN 14 mg/dL      Creatinine 0 56 (L) mg/dL      Glucose 318 (H) mg/dL      Calcium 8 9 mg/dL      eGFR 122 ml/min/1 73sq m     Narrative:         National Kidney Disease Education Program recommendations are as follows:  GFR calculation is accurate only with a steady state creatinine  Chronic Kidney disease less than 60 ml/min/1 73 sq  meters  Kidney failure less than 15 ml/min/1 73 sq  meters      CBC and differential [[id number]]  (Abnormal) Collected:  02/18/18 1050    Lab Status:  Preliminary result Specimen:  Blood from Arm, Right Updated:  02/18/18 1102     WBC 12 56 (H) Thousand/uL      RBC 3 37 (L) Million/uL      Hemoglobin 8 8 (L) g/dL      Hematocrit 28 2 (L) %      MCV 84 fL      MCH 26 1 (L) pg      MCHC 31 2 (L) g/dL      RDW 16 6 (H) %      MPV 9 1 fL      Platelets 942 (H) Thousands/uL     POCT pregnancy, urine [[id number]]  (Normal) Resulted:  02/18/18 1005    Lab Status:  Final result Updated:  02/18/18 1006     EXT PREG TEST UR (Ref: Negative) NEGATIVE (-)                 No orders to display              Procedures  Procedures       Phone Contacts  ED Phone Contact    ED Course  ED Course MDM  Number of Diagnoses or Management Options  Anemia: minor  Cellulitis and abscess of right lower extremity: new and requires workup  Type 1 diabetes [facility]): minor  Diagnosis management comments: The plan is to check laboratories to make sure the patient is having complications from the abscess is as she is diabetic  There is no signs of recurrent cellulitis or sepsis at this time  The patient has previously scheduled follow-up with wound Center tomorrow  Patient will be given a dose of clindamycin as she has healthcare access issues as well as financial constraints      The patient (and any family present) verbalized understanding of the discharge instructions and warnings that would necessitate return to the Emergency Department  All questions were answered prior to discharge  Amount and/or Complexity of Data Reviewed  Clinical lab tests: ordered and reviewed  Review and summarize past medical records: yes (Recent hospitalization notes reviewed)      CritCare Time    Disposition  Final diagnoses:   Cellulitis and abscess of right lower extremity   Type 1 diabetes ([facility] )   Anemia     Time reflects when diagnosis was documented in both MDM as applicable and the Disposition within this note     Time User Action Codes Description Comment    2/18/2018 11:20 AM Lavera Ramp B Add [X26 102,  L02 415] Cellulitis and abscess of right lower extremity     2/18/2018 11:20 AM Lavera Ramp B Add [E10 9] Type 1 diabetes ([facility] )     2/18/2018 11:20 AM Mateusz Betancur Add [D64 9] Anemia       ED Disposition     ED Disposition Condition Comment    Discharge  Aaliyah Doctor discharge to home/self care      Condition at discharge: Stable        Follow-up Information     Follow up With Specialties Details Why Contact Info Additional Information    [address] in 1 day For further evaluation as previously scheduled Sangeetha Ledbetter [place] [id number] [facility] WOUND CARE [facility] [address] , [place], [place], [address]  Schedule an appointment as soon as possible for a visit For further evaluation Jessica [address]  [phone]           Patient's Medications   Discharge Prescriptions    No medications on file     No discharge procedures on file      ED Provider  Electronically Signed by           Aldair January, DO 02/18/18 9240

## 2018-02-27 ENCOUNTER — APPOINTMENT (EMERGENCY)
Dept: NON INVASIVE DIAGNOSTICS | Facility: HOSPITAL | Age: 33
End: 2018-02-27
Payer: MEDICARE

## 2018-02-27 ENCOUNTER — HOSPITAL ENCOUNTER (EMERGENCY)
Facility: HOSPITAL | Age: 33
Discharge: HOME | End: 2018-02-27
Attending: EMERGENCY MEDICINE
Payer: MEDICARE

## 2018-02-27 VITALS
RESPIRATION RATE: 18 BRPM | TEMPERATURE: 98.7 F | BODY MASS INDEX: 25.06 KG/M2 | SYSTOLIC BLOOD PRESSURE: 121 MMHG | OXYGEN SATURATION: 99 % | DIASTOLIC BLOOD PRESSURE: 71 MMHG | WEIGHT: 160 LBS | HEART RATE: 90 BPM

## 2018-02-27 DIAGNOSIS — L03.116 CELLULITIS OF LEFT LEG: Primary | ICD-10-CM

## 2018-02-27 PROCEDURE — 96372 THER/PROPH/DIAG INJ SC/IM: CPT

## 2018-02-27 PROCEDURE — 99284 EMERGENCY DEPT VISIT MOD MDM: CPT

## 2018-02-27 PROCEDURE — 93971 EXTREMITY STUDY: CPT

## 2018-02-27 PROCEDURE — 93971 EXTREMITY STUDY: CPT | Performed by: SURGERY

## 2018-02-27 RX ORDER — KETOROLAC TROMETHAMINE 30 MG/ML
15 INJECTION, SOLUTION INTRAMUSCULAR; INTRAVENOUS ONCE
Status: COMPLETED | OUTPATIENT
Start: 2018-02-27 | End: 2018-02-27

## 2018-02-27 RX ORDER — CLINDAMYCIN HYDROCHLORIDE 150 MG/1
300 CAPSULE ORAL ONCE
Status: COMPLETED | OUTPATIENT
Start: 2018-02-27 | End: 2018-02-27

## 2018-02-27 RX ORDER — CLINDAMYCIN HYDROCHLORIDE 300 MG/1
300 CAPSULE ORAL 4 TIMES DAILY
Qty: 28 CAPSULE | Refills: 0 | Status: SHIPPED | OUTPATIENT
Start: 2018-02-27 | End: 2018-03-06

## 2018-02-27 RX ADMIN — KETOROLAC TROMETHAMINE 15 MG: 30 INJECTION, SOLUTION INTRAMUSCULAR at 11:56

## 2018-02-27 RX ADMIN — CLINDAMYCIN HYDROCHLORIDE 300 MG: 150 CAPSULE ORAL at 11:59

## 2018-02-27 NOTE — DISCHARGE INSTRUCTIONS
Cellulitis   WHAT YOU NEED TO KNOW:   Cellulitis is a skin infection caused by bacteria  Cellulitis may go away on its own or you may need treatment  Your healthcare provider may draw a [place] around the outside edges of your cellulitis  If your cellulitis spreads, your healthcare provider will see it outside of the [place]  DISCHARGE INSTRUCTIONS:   Call 911 if:   · You have sudden trouble breathing or chest pain  Seek care immediately if:   · Your wound gets larger and more painful  · You feel a crackling under your skin when you touch it  · You have purple dots or bumps on your skin, or you see bleeding under your skin  · You have new swelling and pain in your legs  · The red, warm, swollen area gets larger  · You see red streaks coming from the infected area  Contact your healthcare provider if:   · You have a fever  · Your fever or pain does not go away or gets worse  · The area does not get smaller after 2 days of antibiotics  · Your skin is flaking or peeling off  · You have questions or concerns about your condition or care  Medicines:   · Antibiotics  help treat the bacterial infection  · NSAIDs , such as ibuprofen, help decrease swelling, pain, and fever  NSAIDs can cause stomach bleeding or kidney problems in certain people  If you take blood thinner medicine, always ask if NSAIDs are safe for you  Always read the medicine label and follow directions  Do not give these medicines to children under 10months of age without direction from your child's healthcare provider  · Acetaminophen  decreases pain and fever  It is available without a doctor's order  Ask how much to take and how often to take it  Follow directions  Read the labels of all other medicines you are using to see if they also contain acetaminophen, or ask your doctor or pharmacist  Acetaminophen can cause liver damage if not taken correctly   Do not use more than 4 grams (4,000 milligrams) total of acetaminophen in one day  · Take your medicine as directed  Contact your healthcare provider if you think your medicine is not helping or if you have side effects  Tell him or her if you are allergic to any medicine  Keep a list of the medicines, vitamins, and herbs you take  Include the amounts, and when and why you take them  Bring the list or the pill bottles to follow-up visits  Carry your medicine list with you in case of an emergency  Self-care:   · Elevate the area above the level of your heart  as often as you can  This will help decrease swelling and pain  Prop the area on pillows or blankets to keep it elevated comfortably  · Clean the area daily until the wound scabs over  Gently wash the area with soap and water  Pat dry  Use dressings as directed  · Place cool or warm, wet cloths on the area as directed  Use clean cloths and clean water  Leave it on the area until the cloth is room temperature  Pat the area dry with a clean, dry cloth  The cloths may help decrease pain  Prevent cellulitis:   · Do not scratch bug bites or areas of injury  You increase your risk for cellulitis by scratching these areas  · Do not share personal items, such as towels, clothing, and razors  · Clean exercise equipment  with germ-killing  before and after you use it  · Wash your hands often  Use soap and water  Wash your hands after you use the bathroom, change a child's diapers, or sneeze  Wash your hands before you prepare or eat food  Use lotion to prevent dry, cracked skin  · Wear pressure stockings as directed  You may be told to wear the stockings if you have peripheral edema  The stockings improve blood flow and decrease swelling  · Treat athlete's foot  This can help prevent the spread of a bacterial skin infection  Follow up with your healthcare provider within 3 days, or as directed: Your healthcare provider will check if your cellulitis is getting better   You may need different medicine  Write down your questions so you remember to ask them during your visits  © 2017 [facility] Information is for End User's use only and may not be sold, redistributed or otherwise used for commercial purposes  All illustrations and images included in CareNotes® are the copyrighted property of A D A M [employer] Inc  or [employer] Phillips  The above information is an  only  It is not intended as medical advice for individual conditions or treatments  Talk to your doctor, nurse or pharmacist before following any medical regimen to see if it is safe and effective for you  Paronychia   WHAT YOU NEED TO KNOW:   Paronychia is an infection of your nail fold caused by bacteria or a fungus  The nail fold is the skin around your nail  Paronychia may happen suddenly and last for 6 weeks or longer  You may have paronychia on more than 1 finger or toe  DISCHARGE INSTRUCTIONS:   Medicines:   · Td vaccine  is a booster shot used to help prevent tetanus and diphtheria  The Td booster may be given to adolescents and adults every 10 years or for certain wounds and injuries  · Antibiotics: This medicine will help fight or prevent an infection  It may be given as a pill, cream, or ointment  · Steroids: This medicine will help decrease inflammation  It may be given as a pill, cream, or ointment  · Antifungal medicine: This medicine helps kill fungus that may be causing your infection  It may be given as a cream or ointment  · NSAIDs:  These medicines decrease pain and swelling  NSAIDs are available without a doctor's order  Ask your healthcare provider which medicine is right for you  Ask how much to take and when to take it  Take as directed  NSAIDs can cause stomach bleeding and kidney problems if not taken correctly  · Take your medicine as directed  Contact your healthcare provider if you think your medicine is not helping or if you have side effects  Tell him of her if you are allergic to any medicine  Keep a list of the medicines, vitamins, and herbs you take  Include the amounts, and when and why you take them  Bring the list or the pill bottles to follow-up visits  Carry your medicine list with you in case of an emergency  Follow up with your healthcare provider as directed:  Write down your questions so you remember to ask them during your visits  Self-care:   · Soak your nail:  Soak your nail in a mixture of equal parts vinegar and water 3 or 4 times each day  This will help decrease inflammation  · Apply a warm compress:  Soak a washcloth in warm water and place it on your nail  This will help decrease inflammation  · Elevate:  Raise your nail above the level of your heart as often as you can  This will help decrease swelling and pain  Prop your nail on pillows or blankets to keep it elevated comfortably  · Use lotion:  Apply lotion after you wash your hands  This will prevent your skin from becoming too dry  Prevent paronychia:   · Avoid chemicals and allergens that may harm your skin and nails  This includes soaps, laundry detergents, and nail products  · Keep your nails clean and dry  Avoid soaking your nails in water  Use cotton-lined rubber gloves or wear 2 rubber gloves if you work with food or water  The gloves will help protect your nail folds  · Keep your nails short  Do not bite your nails, pick at your hangnails, suck your fingers, or wear fake nails  Bring your own nail tools when you go to the nail salon  Contact your healthcare provider if:   · Your nail becomes loose, deformed, or falls off  · You have a large abscess on your nail  · You have questions or concerns about your condition or care  Return to the emergency department if:   · You have severe nail pain  · The inflammation spreads to your hand or arm    © 2017 [address] Information is for End User's use only and may not be sold, redistributed or otherwise used for commercial purposes  All illustrations and images included in CareNotes® are the copyrighted property of A D A M [employer] Inc  or Wayne Phillips  The above information is an  only  It is not intended as medical advice for individual conditions or treatments  Talk to your doctor, nurse or pharmacist before following any medical regimen to see if it is safe and effective for you

## 2018-02-27 NOTE — ED ATTENDING ATTESTATION
Vanessa Lima DO, saw and evaluated the patient  I have discussed the patient with the resident/non-physician practitioner and agree with the resident's/non-physician practitioner's findings, Plan of Care, and MDM as documented in the resident's/non-physician practitioner's note, except where noted  All available labs and Radiology studies were reviewed  At this point I agree with the current assessment done in the Emergency Department  I have conducted an independent evaluation of this patient a history and physical is as follows:    3 days of LLE swelling and redness to anterior tibial region  Patient with redness and swelling to great toe  Patient with associated pain  Patient is awake alert  Lungs are clear to auscultation bilaterally  The heart is regular without murmurs rubs or gallops  There is tenderness and swelling to the left lower extremity with erythema to the pretibial region  The patient is intact distally in the left lower extremity  The plan is to obtain an ultrasound to rule out DVT in the affected extremity    The patient will be started on clindamycin for cellulitis  Critical Care Time  CritCare Time    Procedures

## 2018-02-27 NOTE — ED PROVIDER NOTES
History  Chief Complaint   Patient presents with    Leg Swelling     Left lower leg swelling, pain, redness, and warm to the touch x 3 days  Denies fever, cp, sob  Hx of dm  Pt presents for evaluation of acute onset left leg swelling and pain  States that this began three days ago and denies any history of trauma  She lives in a shelter and has been trying to keep her feet elevated, but is still walking a lot  States that it began as a little red area on the anterior aspect of her leg, and then the rest of the leg has become swollen  Also has a paronychia of her left hallux medial border  She is unsure about how long it has been there, and states that she does pick at it  She has been having trouble ambulating on it  She has a hx of IV drug abuse and denies use over the last month, states she has never injected in the foot or ankle  Denies any recent fluctuations in blood sugars as well  Prior to Admission Medications   Prescriptions Last Dose Informant Patient Reported? Taking?    amLODIPine (NORVASC) 10 mg tablet   Yes No   Sig: Take 10 mg by mouth daily   gabapentin (NEURONTIN) 100 mg capsule   Yes No   Sig: Take 100 mg by mouth 3 (three) times a day   ibuprofen (MOTRIN) 600 mg tablet   No No   Sig: Take 1 tablet (600 mg total) by mouth daily as needed for moderate pain (with packing changes)   insulin glargine (LANTUS) 100 units/mL subcutaneous injection   No No   Sig: Inject 50 Units under the skin daily   insulin lispro (HumaLOG) 100 units/mL injection   No No   Sig: Inject 8 Units under the skin 3 (three) times a day with meals   nicotine (NICODERM CQ) 14 mg/24hr TD 24 hr patch   No No   Sig: Place 1 patch on the skin daily   pantoprazole (PROTONIX) 40 mg tablet   Yes No   Sig: Take 40 mg by mouth daily   venlafaxine (EFFEXOR) 37 5 mg tablet   Yes No   Sig: Take 37 5 mg by mouth 2 (two) times a day      Facility-Administered Medications: None       Past Medical History:   Diagnosis Date    Diabetes mellitus ([facility] )     GERD (gastroesophageal reflux disease)     Hepatitis C     Hypertension     Seizures ([facility] )     last seizure Nov 2017       Past Surgical History:   Procedure Laterality Date    BACK SURGERY      lumbar     EYE SURGERY      bilateral    INCISION AND DRAINAGE ABSCESS / HEMATOMA OF BURSA / KNEE / THIGH Right        History reviewed  No pertinent family history  I have reviewed and agree with the history as documented  Social History   Substance Use Topics    Smoking status: Current Every Day Smoker    Smokeless tobacco: Never Used    Alcohol use No        Review of Systems   Constitutional: Negative for appetite change, chills and fever  Cardiovascular: Positive for leg swelling  Negative for chest pain  Gastrointestinal: Negative for abdominal distention and abdominal pain  Musculoskeletal: Positive for gait problem  Skin: Negative for wound  All other systems reviewed and are negative  Physical Exam  ED Triage Vitals [02/27/18 0955]   Temperature Pulse Respirations Blood Pressure SpO2   98 7 °F (37 1 °C) 98 18 140/80 99 %      Temp Source Heart Rate Source Patient Position - Orthostatic VS BP Location FiO2 (%)   Temporal -- -- -- --      Pain Score       Worst Possible Pain           Orthostatic Vital Signs  Vitals:    02/27/18 0955   BP: 140/80   Pulse: 98       Physical Exam   Constitutional: She is oriented to person, place, and time  She appears well-developed and well-nourished  HENT:   Head: Normocephalic and atraumatic  Eyes: Pupils are equal, round, and reactive to light  Neck: Normal range of motion  Cardiovascular: Normal rate, normal heart sounds and intact distal pulses  DP and PT pulses are +1/4 to the b/l LE    +2/4 pitting edema to the LLE   Pulmonary/Chest: Effort normal    Abdominal: Soft  Bowel sounds are normal    Musculoskeletal: Normal range of motion  She exhibits edema and tenderness  There is TTP of the anterior tibial area  TTP with ROM of the ankle  Neurological: She is alert and oriented to person, place, and time  Gross sensation intact   Skin: Skin is warm and dry  There is erythema  There is an ingrown hallux medial border of the nail  There is a pyogrnic granuloma present  No ascending erythema  There is erythema and TTP of the anterior tibial area  Nursing note and vitals reviewed  ED Medications  Medications - No data to display    Diagnostic Studies  Results Reviewed     None                 No orders to display         Procedures  Procedures      Phone Consults  ED Phone Contact    ED Course  ED Course                                MDM  Number of Diagnoses or Management Options  Cellulitis of left leg:   Diagnosis management comments: - Venous duplex ordered to r/o DVT, negative for DVT  - Will Rx clinda due to hx of MRSA and associated allergies  - toradol for pain  - will d/c hme and recommendation rest and elevation with 7 day course of clinda  - advised to return to ED if cellulitis worsens, and advised to obtain outpatient follow-up with podiatry for ingrown toenail  CritCare Time    Disposition  Final diagnoses:   None     ED Disposition     None      Follow-up Information    None       Patient's Medications   Discharge Prescriptions    No medications on file     No discharge procedures on file  ED Provider  Attending physically available and evaluated Tasha Temple I managed the patient along with the ED Attending      Electronically Signed by         Karen Boone  02/27/18 0091

## 2018-03-17 ENCOUNTER — APPOINTMENT (INPATIENT)
Dept: RADIOLOGY | Facility: HOSPITAL | Age: 33
DRG: 639 | End: 2018-03-17
Payer: MEDICARE

## 2018-03-17 ENCOUNTER — HOSPITAL ENCOUNTER (INPATIENT)
Facility: HOSPITAL | Age: 33
LOS: 3 days | Discharge: HOME | DRG: 639 | End: 2018-03-20
Attending: INTERNAL MEDICINE | Admitting: INTERNAL MEDICINE
Payer: MEDICARE

## 2018-03-17 DIAGNOSIS — E10.65 TYPE 1 DIABETES MELLITUS WITH HYPERGLYCEMIA (HCC): ICD-10-CM

## 2018-03-17 DIAGNOSIS — F19.90 IVDU (INTRAVENOUS DRUG USER): Primary | ICD-10-CM

## 2018-03-17 DIAGNOSIS — F11.10 HEROIN ABUSE (HCC): ICD-10-CM

## 2018-03-17 DIAGNOSIS — B19.20 HEPATITIS C: ICD-10-CM

## 2018-03-17 DIAGNOSIS — E11.9 DIABETES (HCC): ICD-10-CM

## 2018-03-17 DIAGNOSIS — E11.10 DKA (DIABETIC KETOACIDOSES): ICD-10-CM

## 2018-03-17 DIAGNOSIS — Z91.19 H/O NONCOMPLIANCE WITH MEDICAL TREATMENT, PRESENTING HAZARDS TO HEALTH: ICD-10-CM

## 2018-03-17 PROBLEM — L03.115 CELLULITIS AND ABSCESS OF RIGHT LOWER EXTREMITY: Status: RESOLVED | Noted: 2018-02-10 | Resolved: 2018-03-17

## 2018-03-17 PROBLEM — L02.415 CELLULITIS AND ABSCESS OF RIGHT LOWER EXTREMITY: Status: RESOLVED | Noted: 2018-02-10 | Resolved: 2018-03-17

## 2018-03-17 PROBLEM — R73.9 HYPERGLYCEMIA: Status: RESOLVED | Noted: 2018-01-26 | Resolved: 2018-03-17

## 2018-03-17 PROBLEM — F14.10 COCAINE ABUSE (HCC): Status: ACTIVE | Noted: 2018-03-17

## 2018-03-17 PROBLEM — L03.90 SEPSIS DUE TO CELLULITIS (HCC): Status: RESOLVED | Noted: 2018-01-27 | Resolved: 2018-03-17

## 2018-03-17 PROBLEM — J10.1 INFLUENZA A: Status: RESOLVED | Noted: 2018-01-28 | Resolved: 2018-03-17

## 2018-03-17 PROBLEM — A41.9 SEPSIS DUE TO CELLULITIS (HCC): Status: RESOLVED | Noted: 2018-01-27 | Resolved: 2018-03-17

## 2018-03-17 PROBLEM — D72.829 LEUKOCYTOSIS (LEUCOCYTOSIS): Status: ACTIVE | Noted: 2018-03-17

## 2018-03-17 PROBLEM — A49.02 MRSA (METHICILLIN RESISTANT STAPHYLOCOCCUS AUREUS): Status: ACTIVE | Noted: 2018-03-17

## 2018-03-17 LAB
ACETONE SERPL-MCNC: ABNORMAL MG/DL
ALBUMIN SERPL BCP-MCNC: 3.9 G/DL (ref 3.5–5)
ALP SERPL-CCNC: 124 U/L (ref 46–116)
ALT SERPL W P-5'-P-CCNC: 16 U/L (ref 12–78)
AMPHETAMINES SERPL QL SCN: NEGATIVE
ANION GAP SERPL CALCULATED.3IONS-SCNC: 10 MMOL/L (ref 4–13)
ANION GAP SERPL CALCULATED.3IONS-SCNC: 18 MMOL/L (ref 4–13)
ANION GAP SERPL CALCULATED.3IONS-SCNC: 25 MMOL/L (ref 4–13)
AST SERPL W P-5'-P-CCNC: 39 U/L (ref 5–45)
ATRIAL RATE: 113 BPM
BACTERIA UR QL AUTO: ABNORMAL /HPF
BARBITURATES UR QL: NEGATIVE
BASE EX.OXY STD BLDV CALC-SCNC: 96.9 % (ref 60–80)
BASE EXCESS BLDV CALC-SCNC: -15.3 MMOL/L
BASOPHILS # BLD AUTO: 0.04 THOUSANDS/ΜL (ref 0–0.1)
BASOPHILS NFR BLD AUTO: 0 % (ref 0–1)
BENZODIAZ UR QL: NEGATIVE
BILIRUB SERPL-MCNC: 0.73 MG/DL (ref 0.2–1)
BILIRUB UR QL STRIP: NEGATIVE
BUN SERPL-MCNC: 15 MG/DL (ref 5–25)
BUN SERPL-MCNC: 19 MG/DL (ref 5–25)
BUN SERPL-MCNC: 23 MG/DL (ref 5–25)
CALCIUM SERPL-MCNC: 7.8 MG/DL (ref 8.3–10.1)
CALCIUM SERPL-MCNC: 8.6 MG/DL (ref 8.3–10.1)
CALCIUM SERPL-MCNC: 9.2 MG/DL (ref 8.3–10.1)
CHLORIDE SERPL-SCNC: 108 MMOL/L (ref 100–108)
CHLORIDE SERPL-SCNC: 112 MMOL/L (ref 100–108)
CHLORIDE SERPL-SCNC: 99 MMOL/L (ref 100–108)
CLARITY UR: CLEAR
CO2 SERPL-SCNC: 13 MMOL/L (ref 21–32)
CO2 SERPL-SCNC: 17 MMOL/L (ref 21–32)
CO2 SERPL-SCNC: 21 MMOL/L (ref 21–32)
COCAINE UR QL: POSITIVE
COLOR UR: YELLOW
CREAT SERPL-MCNC: 0.86 MG/DL (ref 0.6–1.3)
CREAT SERPL-MCNC: 1.07 MG/DL (ref 0.6–1.3)
CREAT SERPL-MCNC: 1.11 MG/DL (ref 0.6–1.3)
EOSINOPHIL # BLD AUTO: 0.13 THOUSAND/ΜL (ref 0–0.61)
EOSINOPHIL NFR BLD AUTO: 1 % (ref 0–6)
ERYTHROCYTE [DISTWIDTH] IN BLOOD BY AUTOMATED COUNT: 17.8 % (ref 11.6–15.1)
ERYTHROCYTE [DISTWIDTH] IN BLOOD BY AUTOMATED COUNT: 18 % (ref 11.6–15.1)
EXT PREG TEST URINE: NEGATIVE
GFR SERPL CREATININE-BSD FRML MDRD: 65 ML/MIN/1.73SQ M
GFR SERPL CREATININE-BSD FRML MDRD: 68 ML/MIN/1.73SQ M
GFR SERPL CREATININE-BSD FRML MDRD: 88 ML/MIN/1.73SQ M
GLUCOSE SERPL-MCNC: 118 MG/DL (ref 65–140)
GLUCOSE SERPL-MCNC: 128 MG/DL (ref 65–140)
GLUCOSE SERPL-MCNC: 133 MG/DL (ref 65–140)
GLUCOSE SERPL-MCNC: 141 MG/DL (ref 65–140)
GLUCOSE SERPL-MCNC: 166 MG/DL (ref 65–140)
GLUCOSE SERPL-MCNC: 175 MG/DL (ref 65–140)
GLUCOSE SERPL-MCNC: 201 MG/DL (ref 65–140)
GLUCOSE SERPL-MCNC: 207 MG/DL (ref 65–140)
GLUCOSE SERPL-MCNC: 207 MG/DL (ref 65–140)
GLUCOSE SERPL-MCNC: 240 MG/DL (ref 65–140)
GLUCOSE SERPL-MCNC: 263 MG/DL (ref 65–140)
GLUCOSE SERPL-MCNC: 271 MG/DL (ref 65–140)
GLUCOSE SERPL-MCNC: 290 MG/DL (ref 65–140)
GLUCOSE SERPL-MCNC: 393 MG/DL (ref 65–140)
GLUCOSE SERPL-MCNC: 478 MG/DL (ref 65–140)
GLUCOSE SERPL-MCNC: 60 MG/DL (ref 65–140)
GLUCOSE SERPL-MCNC: 67 MG/DL (ref 65–140)
GLUCOSE SERPL-MCNC: 85 MG/DL (ref 65–140)
GLUCOSE UR STRIP-MCNC: ABNORMAL MG/DL
HCO3 BLDV-SCNC: 10.6 MMOL/L (ref 24–30)
HCT VFR BLD AUTO: 34.8 % (ref 34.8–46.1)
HCT VFR BLD AUTO: 35.7 % (ref 34.8–46.1)
HGB BLD-MCNC: 10.5 G/DL (ref 11.5–15.4)
HGB BLD-MCNC: 10.9 G/DL (ref 11.5–15.4)
HGB UR QL STRIP.AUTO: ABNORMAL
KETONES UR STRIP-MCNC: ABNORMAL MG/DL
LACTATE SERPL-SCNC: 1.2 MMOL/L (ref 0.5–2)
LEUKOCYTE ESTERASE UR QL STRIP: NEGATIVE
LIPASE SERPL-CCNC: 139 U/L (ref 73–393)
LYMPHOCYTES # BLD AUTO: 1.98 THOUSANDS/ΜL (ref 0.6–4.47)
LYMPHOCYTES NFR BLD AUTO: 13 % (ref 14–44)
MAGNESIUM SERPL-MCNC: 2.4 MG/DL (ref 1.6–2.6)
MCH RBC QN AUTO: 26.7 PG (ref 26.8–34.3)
MCH RBC QN AUTO: 26.7 PG (ref 26.8–34.3)
MCHC RBC AUTO-ENTMCNC: 30.2 G/DL (ref 31.4–37.4)
MCHC RBC AUTO-ENTMCNC: 30.5 G/DL (ref 31.4–37.4)
MCV RBC AUTO: 88 FL (ref 82–98)
MCV RBC AUTO: 89 FL (ref 82–98)
METHADONE UR QL: NEGATIVE
MONOCYTES # BLD AUTO: 0.59 THOUSAND/ΜL (ref 0.17–1.22)
MONOCYTES NFR BLD AUTO: 4 % (ref 4–12)
NEUTROPHILS # BLD AUTO: 13.08 THOUSANDS/ΜL (ref 1.85–7.62)
NEUTS SEG NFR BLD AUTO: 82 % (ref 43–75)
NITRITE UR QL STRIP: NEGATIVE
NON-SQ EPI CELLS URNS QL MICRO: ABNORMAL /HPF
NRBC BLD AUTO-RTO: 0 /100 WBCS
O2 CT BLDV-SCNC: 15.8 ML/DL
OPIATES UR QL SCN: POSITIVE
P AXIS: 77 DEGREES
PCO2 BLDV: 25.6 MM HG (ref 42–50)
PCP UR QL: NEGATIVE
PH BLDV: 7.23 [PH] (ref 7.3–7.4)
PH UR STRIP.AUTO: 5.5 [PH] (ref 4.5–8)
PHOSPHATE SERPL-MCNC: 2.5 MG/DL (ref 2.7–4.5)
PLATELET # BLD AUTO: 334 THOUSANDS/UL (ref 149–390)
PLATELET # BLD AUTO: 380 THOUSANDS/UL (ref 149–390)
PMV BLD AUTO: 10.8 FL (ref 8.9–12.7)
PMV BLD AUTO: 11.7 FL (ref 8.9–12.7)
PO2 BLDV: 141.4 MM HG (ref 35–45)
POTASSIUM SERPL-SCNC: 3.6 MMOL/L (ref 3.5–5.3)
POTASSIUM SERPL-SCNC: 4.2 MMOL/L (ref 3.5–5.3)
POTASSIUM SERPL-SCNC: 5.3 MMOL/L (ref 3.5–5.3)
PR INTERVAL: 130 MS
PROT SERPL-MCNC: 8.2 G/DL (ref 6.4–8.2)
PROT UR STRIP-MCNC: ABNORMAL MG/DL
QRS AXIS: 85 DEGREES
QRSD INTERVAL: 76 MS
QT INTERVAL: 326 MS
QTC INTERVAL: 447 MS
RBC # BLD AUTO: 3.93 MILLION/UL (ref 3.81–5.12)
RBC # BLD AUTO: 4.08 MILLION/UL (ref 3.81–5.12)
RBC #/AREA URNS AUTO: ABNORMAL /HPF
SODIUM SERPL-SCNC: 137 MMOL/L (ref 136–145)
SODIUM SERPL-SCNC: 143 MMOL/L (ref 136–145)
SODIUM SERPL-SCNC: 143 MMOL/L (ref 136–145)
SP GR UR STRIP.AUTO: 1.01 (ref 1–1.03)
T WAVE AXIS: 67 DEGREES
THC UR QL: NEGATIVE
TROPONIN I SERPL-MCNC: <0.02 NG/ML
UROBILINOGEN UR QL STRIP.AUTO: 0.2 E.U./DL
VENTRICULAR RATE: 113 BPM
WBC # BLD AUTO: 15.82 THOUSAND/UL (ref 4.31–10.16)
WBC # BLD AUTO: 16.82 THOUSAND/UL (ref 4.31–10.16)
WBC #/AREA URNS AUTO: ABNORMAL /HPF

## 2018-03-17 PROCEDURE — 82948 REAGENT STRIP/BLOOD GLUCOSE: CPT

## 2018-03-17 PROCEDURE — 82805 BLOOD GASES W/O2 SATURATION: CPT | Performed by: PHYSICIAN ASSISTANT

## 2018-03-17 PROCEDURE — 81002 URINALYSIS NONAUTO W/O SCOPE: CPT | Performed by: PHYSICIAN ASSISTANT

## 2018-03-17 PROCEDURE — 80307 DRUG TEST PRSMV CHEM ANLYZR: CPT | Performed by: PHYSICIAN ASSISTANT

## 2018-03-17 PROCEDURE — 81001 URINALYSIS AUTO W/SCOPE: CPT

## 2018-03-17 PROCEDURE — 99285 EMERGENCY DEPT VISIT HI MDM: CPT

## 2018-03-17 PROCEDURE — 83605 ASSAY OF LACTIC ACID: CPT | Performed by: PHYSICIAN ASSISTANT

## 2018-03-17 PROCEDURE — 87081 CULTURE SCREEN ONLY: CPT | Performed by: NURSE PRACTITIONER

## 2018-03-17 PROCEDURE — 93010 ELECTROCARDIOGRAM REPORT: CPT | Performed by: INTERNAL MEDICINE

## 2018-03-17 PROCEDURE — 96360 HYDRATION IV INFUSION INIT: CPT

## 2018-03-17 PROCEDURE — 93005 ELECTROCARDIOGRAM TRACING: CPT

## 2018-03-17 PROCEDURE — 83690 ASSAY OF LIPASE: CPT | Performed by: PHYSICIAN ASSISTANT

## 2018-03-17 PROCEDURE — 85027 COMPLETE CBC AUTOMATED: CPT | Performed by: NURSE PRACTITIONER

## 2018-03-17 PROCEDURE — 85025 COMPLETE CBC W/AUTO DIFF WBC: CPT | Performed by: PHYSICIAN ASSISTANT

## 2018-03-17 PROCEDURE — 80053 COMPREHEN METABOLIC PANEL: CPT | Performed by: PHYSICIAN ASSISTANT

## 2018-03-17 PROCEDURE — 82009 KETONE BODYS QUAL: CPT | Performed by: PHYSICIAN ASSISTANT

## 2018-03-17 PROCEDURE — 36415 COLL VENOUS BLD VENIPUNCTURE: CPT | Performed by: PHYSICIAN ASSISTANT

## 2018-03-17 PROCEDURE — 80048 BASIC METABOLIC PNL TOTAL CA: CPT | Performed by: NURSE PRACTITIONER

## 2018-03-17 PROCEDURE — 96361 HYDRATE IV INFUSION ADD-ON: CPT

## 2018-03-17 PROCEDURE — 87040 BLOOD CULTURE FOR BACTERIA: CPT | Performed by: NURSE PRACTITIONER

## 2018-03-17 PROCEDURE — 84100 ASSAY OF PHOSPHORUS: CPT | Performed by: NURSE PRACTITIONER

## 2018-03-17 PROCEDURE — 81025 URINE PREGNANCY TEST: CPT | Performed by: PHYSICIAN ASSISTANT

## 2018-03-17 PROCEDURE — 83735 ASSAY OF MAGNESIUM: CPT | Performed by: NURSE PRACTITIONER

## 2018-03-17 PROCEDURE — 71046 X-RAY EXAM CHEST 2 VIEWS: CPT

## 2018-03-17 PROCEDURE — 96372 THER/PROPH/DIAG INJ SC/IM: CPT

## 2018-03-17 PROCEDURE — 84484 ASSAY OF TROPONIN QUANT: CPT | Performed by: PHYSICIAN ASSISTANT

## 2018-03-17 PROCEDURE — 99223 1ST HOSP IP/OBS HIGH 75: CPT | Performed by: INTERNAL MEDICINE

## 2018-03-17 RX ORDER — ACETAMINOPHEN 325 MG/1
650 TABLET ORAL EVERY 6 HOURS PRN
Status: DISCONTINUED | OUTPATIENT
Start: 2018-03-17 | End: 2018-03-20 | Stop reason: HOSPADM

## 2018-03-17 RX ORDER — POTASSIUM CHLORIDE 14.9 MG/ML
20 INJECTION INTRAVENOUS
Status: DISPENSED | OUTPATIENT
Start: 2018-03-17 | End: 2018-03-17

## 2018-03-17 RX ORDER — PANTOPRAZOLE SODIUM 40 MG/1
40 TABLET, DELAYED RELEASE ORAL
Status: DISCONTINUED | OUTPATIENT
Start: 2018-03-17 | End: 2018-03-20 | Stop reason: HOSPADM

## 2018-03-17 RX ORDER — TRIAMCINOLONE ACETONIDE 1 MG/G
CREAM TOPICAL
COMMUNITY
End: 2018-03-20 | Stop reason: HOSPADM

## 2018-03-17 RX ORDER — INSULIN GLARGINE 100 [IU]/ML
50 INJECTION, SOLUTION SUBCUTANEOUS DAILY
Status: DISCONTINUED | OUTPATIENT
Start: 2018-03-18 | End: 2018-03-17

## 2018-03-17 RX ORDER — POTASSIUM CHLORIDE 20 MEQ/1
40 TABLET, EXTENDED RELEASE ORAL ONCE
Status: COMPLETED | OUTPATIENT
Start: 2018-03-17 | End: 2018-03-17

## 2018-03-17 RX ORDER — GABAPENTIN 100 MG/1
100 CAPSULE ORAL 3 TIMES DAILY
Status: DISCONTINUED | OUTPATIENT
Start: 2018-03-17 | End: 2018-03-20 | Stop reason: HOSPADM

## 2018-03-17 RX ORDER — VENLAFAXINE HYDROCHLORIDE 37.5 MG/1
37.5 CAPSULE, EXTENDED RELEASE ORAL
COMMUNITY
Start: 2017-12-21

## 2018-03-17 RX ORDER — INSULIN GLARGINE 100 [IU]/ML
30 INJECTION, SOLUTION SUBCUTANEOUS DAILY
Status: DISCONTINUED | OUTPATIENT
Start: 2018-03-18 | End: 2018-03-17

## 2018-03-17 RX ORDER — PROMETHAZINE HYDROCHLORIDE 25 MG/ML
12.5 INJECTION, SOLUTION INTRAMUSCULAR; INTRAVENOUS ONCE
Status: COMPLETED | OUTPATIENT
Start: 2018-03-17 | End: 2018-03-17

## 2018-03-17 RX ORDER — HEPARIN SODIUM 5000 [USP'U]/ML
5000 INJECTION, SOLUTION INTRAVENOUS; SUBCUTANEOUS EVERY 8 HOURS SCHEDULED
Status: DISCONTINUED | OUTPATIENT
Start: 2018-03-17 | End: 2018-03-20 | Stop reason: HOSPADM

## 2018-03-17 RX ORDER — ONDANSETRON 4 MG/1
4 TABLET, ORALLY DISINTEGRATING ORAL ONCE
Status: COMPLETED | OUTPATIENT
Start: 2018-03-17 | End: 2018-03-17

## 2018-03-17 RX ORDER — SODIUM CHLORIDE 9 MG/ML
500 INJECTION, SOLUTION INTRAVENOUS CONTINUOUS
Status: DISCONTINUED | OUTPATIENT
Start: 2018-03-17 | End: 2018-03-17

## 2018-03-17 RX ORDER — ONDANSETRON 2 MG/ML
4 INJECTION INTRAMUSCULAR; INTRAVENOUS EVERY 6 HOURS PRN
Status: DISCONTINUED | OUTPATIENT
Start: 2018-03-17 | End: 2018-03-20 | Stop reason: HOSPADM

## 2018-03-17 RX ORDER — GABAPENTIN 100 MG/1
100 CAPSULE ORAL 3 TIMES DAILY
COMMUNITY
Start: 2017-12-21

## 2018-03-17 RX ORDER — SODIUM CHLORIDE 9 MG/ML
2000 INJECTION, SOLUTION INTRAVENOUS CONTINUOUS
Status: DISCONTINUED | OUTPATIENT
Start: 2018-03-17 | End: 2018-03-17

## 2018-03-17 RX ORDER — DEXTROSE AND SODIUM CHLORIDE 5; .9 G/100ML; G/100ML
250 INJECTION, SOLUTION INTRAVENOUS CONTINUOUS
Status: DISCONTINUED | OUTPATIENT
Start: 2018-03-17 | End: 2018-03-17

## 2018-03-17 RX ORDER — PANTOPRAZOLE SODIUM 40 MG/1
40 TABLET, DELAYED RELEASE ORAL
Status: DISCONTINUED | OUTPATIENT
Start: 2018-03-18 | End: 2018-03-17

## 2018-03-17 RX ORDER — SODIUM CHLORIDE 9 MG/ML
250 INJECTION, SOLUTION INTRAVENOUS CONTINUOUS
Status: DISCONTINUED | OUTPATIENT
Start: 2018-03-17 | End: 2018-03-17

## 2018-03-17 RX ORDER — AMLODIPINE BESYLATE 10 MG/1
10 TABLET ORAL
COMMUNITY
Start: 2017-12-21 | End: 2018-03-20 | Stop reason: HOSPADM

## 2018-03-17 RX ORDER — MAGNESIUM SULFATE HEPTAHYDRATE 40 MG/ML
4 INJECTION, SOLUTION INTRAVENOUS ONCE
Status: COMPLETED | OUTPATIENT
Start: 2018-03-17 | End: 2018-03-17

## 2018-03-17 RX ORDER — PANTOPRAZOLE SODIUM 40 MG/1
40 TABLET, DELAYED RELEASE ORAL
COMMUNITY
Start: 2017-12-21

## 2018-03-17 RX ORDER — INSULIN GLARGINE 100 [IU]/ML
30 INJECTION, SOLUTION SUBCUTANEOUS DAILY
Status: DISCONTINUED | OUTPATIENT
Start: 2018-03-17 | End: 2018-03-18

## 2018-03-17 RX ORDER — 0.9 % SODIUM CHLORIDE 0.9 %
3 VIAL (ML) INJECTION AS NEEDED
Status: DISCONTINUED | OUTPATIENT
Start: 2018-03-17 | End: 2018-03-17

## 2018-03-17 RX ORDER — VENLAFAXINE HYDROCHLORIDE 37.5 MG/1
37.5 CAPSULE, EXTENDED RELEASE ORAL DAILY
Status: DISCONTINUED | OUTPATIENT
Start: 2018-03-17 | End: 2018-03-20 | Stop reason: HOSPADM

## 2018-03-17 RX ADMIN — SODIUM CHLORIDE 1000 ML: 0.9 INJECTION, SOLUTION INTRAVENOUS at 08:52

## 2018-03-17 RX ADMIN — ONDANSETRON 4 MG: 2 INJECTION INTRAMUSCULAR; INTRAVENOUS at 19:55

## 2018-03-17 RX ADMIN — SODIUM CHLORIDE 250 ML/HR: 0.9 INJECTION, SOLUTION INTRAVENOUS at 10:02

## 2018-03-17 RX ADMIN — SODIUM PHOSPHATE, MONOBASIC, MONOHYDRATE 12 MMOL: 276; 142 INJECTION, SOLUTION INTRAVENOUS at 13:10

## 2018-03-17 RX ADMIN — ACETAMINOPHEN 650 MG: 325 TABLET, FILM COATED ORAL at 17:49

## 2018-03-17 RX ADMIN — INSULIN GLARGINE 30 UNITS: 100 INJECTION, SOLUTION SUBCUTANEOUS at 22:50

## 2018-03-17 RX ADMIN — PANTOPRAZOLE SODIUM 40 MG: 40 TABLET, DELAYED RELEASE ORAL at 21:09

## 2018-03-17 RX ADMIN — GABAPENTIN 100 MG: 100 CAPSULE ORAL at 21:09

## 2018-03-17 RX ADMIN — DEXTROSE AND SODIUM CHLORIDE 250 ML/HR: 5; .9 INJECTION, SOLUTION INTRAVENOUS at 17:09

## 2018-03-17 RX ADMIN — POTASSIUM CHLORIDE 20 MEQ: 200 INJECTION, SOLUTION INTRAVENOUS at 11:44

## 2018-03-17 RX ADMIN — INSULIN LISPRO 5 UNITS: 100 INJECTION, SOLUTION INTRAVENOUS; SUBCUTANEOUS at 07:48

## 2018-03-17 RX ADMIN — ONDANSETRON 4 MG: 4 TABLET, ORALLY DISINTEGRATING ORAL at 05:08

## 2018-03-17 RX ADMIN — SODIUM CHLORIDE 6 UNITS/HR: 9 INJECTION, SOLUTION INTRAVENOUS at 09:07

## 2018-03-17 RX ADMIN — HEPARIN SODIUM 5000 UNITS: 5000 INJECTION, SOLUTION INTRAVENOUS; SUBCUTANEOUS at 14:55

## 2018-03-17 RX ADMIN — MAGNESIUM SULFATE IN WATER 4 G: 40 INJECTION, SOLUTION INTRAVENOUS at 13:09

## 2018-03-17 RX ADMIN — VENLAFAXINE HYDROCHLORIDE 37.5 MG: 37.5 CAPSULE, EXTENDED RELEASE ORAL at 17:10

## 2018-03-17 RX ADMIN — Medication 6 UNITS/HR: at 14:54

## 2018-03-17 RX ADMIN — DEXTROSE AND SODIUM CHLORIDE 250 ML/HR: 5; .9 INJECTION, SOLUTION INTRAVENOUS at 12:36

## 2018-03-17 RX ADMIN — HEPARIN SODIUM 5000 UNITS: 5000 INJECTION, SOLUTION INTRAVENOUS; SUBCUTANEOUS at 21:16

## 2018-03-17 RX ADMIN — PROMETHAZINE HYDROCHLORIDE 12.5 MG: 25 INJECTION INTRAMUSCULAR; INTRAVENOUS at 23:42

## 2018-03-17 RX ADMIN — INSULIN LISPRO 2 UNITS: 100 INJECTION, SOLUTION INTRAVENOUS; SUBCUTANEOUS at 22:49

## 2018-03-17 RX ADMIN — SODIUM CHLORIDE 1000 ML: 0.9 INJECTION, SOLUTION INTRAVENOUS at 07:05

## 2018-03-17 RX ADMIN — POTASSIUM CHLORIDE 40 MEQ: 1500 TABLET, EXTENDED RELEASE ORAL at 17:10

## 2018-03-17 NOTE — ED NOTES
Entered room to introduce self to patient, pt states, "I need to tired to go to the bathroom, put me on the bedpan", asked pt if she is able to ambulate, states "Yeah, I just don't feel like it, I'm tired and I don't feel like getting up", pt encouraged to ambulate to bathroom if she is able  Pt did ambulate to bathroom with steady even gait  Provided urine sample        Yola Sharma RN  03/17/18 0615

## 2018-03-17 NOTE — ED NOTES
One vial of humalog being sent to pharmacy  Pt denies having any other meds with her   She states the rest of her meds are "at the shelter"      Sravanthi De Leon, STEVE  03/17/18 1007

## 2018-03-17 NOTE — ED NOTES
SHYAM Smith and SHYAM Dominguez entered room to do belongings checklist, pt admitted to having paraphernalia and drugs in her possession along with needles that she states she uses to administer insulin  Pt in possession of what appears to be heroin along with three pills  One small clear yellow gel cap with no markings to identify pill by, one pill in blister pack which states "Quetiapine 50mg" and one unpackaged pill identified by markings via micromedix as trazodone  Per hospital policy security called to bedside and [employer] called to collect drugs and paraphernalia       Tsaile STEVE Matthews  03/17/18 2472

## 2018-03-17 NOTE — ED NOTES
Pts IV appears to have infiltrated  Will attempt another IV        Ashish Subramanian, STEVE  03/17/18 7369

## 2018-03-17 NOTE — PLAN OF CARE
Patient was having n/v since last evening, arrived in hospital this morning  Patient is a known diabetic and has frequent hospital visits r/t her diabetes and IV drug abuse    Patient is on IV fluids and IV insulin gtt

## 2018-03-17 NOTE — ED ATTENDING ATTESTATION
Stefani Thomas MD, saw and evaluated the patient  I have discussed the patient with the resident/non-physician practitioner and agree with the resident's/non-physician practitioner's findings, Plan of Care, and MDM as documented in the resident's/non-physician practitioner's note, except where noted  All available labs and Radiology studies were reviewed  At this point I agree with the current assessment done in the Emergency Department  I have conducted an independent evaluation of this patient a history and physical is as follows:  Patient obtained in sign out  Patient is a drug user from [place] and a type 1 diabetic and does not have any of her medication and has been taking her insulin  She has been having nausea and vomiting  Initially the thought was that this was mostly heroin however upon review of the labs with an elevated blood sugar and an elevated anion gap assessing for DKA  Fluid hydration  I used the ultrasound to obtain IV access as the initial IV infiltrated  It was placed in the right antecubital fossa with good blood return  Acetone and VBG were sent off and fluid hydration initiated  Concern for DKA patient will most likely need to be admitted to the hospital   She is currently not vomiting  Abdomen is benign  She is still tachycardic  Afebrile  Respiratory rate only 13  She is arousable to voice  Oxygen saturation is normal   Critical Care Time  The patient presented with a condition in which there was a high probability of imminent or life-threatening deterioration, and critical care services (excluding separately billable procedures) totalled 30-74 minutes          CriticalCare Time  Performed by: Temitope Parmar by: Samson FERRER [facility] provider statement:     Critical care time (minutes):  35    Critical care time was exclusive of:  Separately billable procedures and treating other patients and teaching time Critical care was necessary to treat or prevent imminent or life-threatening deterioration of the following conditions:  Metabolic crisis (DKA evaluation, dehydration )    Critical care was time spent personally by me on the following activities:  Development of treatment plan with patient or surrogate, discussions with primary provider, evaluation of patient's response to treatment, examination of patient, re-evaluation of patient's condition and ordering and review of laboratory studies (history from chart and sign out/PA)    I assumed direction of critical care for this patient from another provider in my specialty: no

## 2018-03-17 NOTE — ED RE-EVALUATION NOTE
The patient is a 49-year-old female with a history of heroin abuse and lives at [facility] in the [place]  The patient was given to me as a sign out this morning  Patient was brought into the emergency room by EMS for vomiting  Initially the patients symptoms was felt to be more or less related to heroin abuse  Upon re-evaluation of lab work the patient had an elevated blood sugar, anion gap, and was a type 1 diabetic  The patient admits to me that she has not had insulin and several days  Patient was found to be in DKA  Fluid bolus and insulin administration was administered  Patient accepted to step-down       Kell Gilmore PA-C  03/17/18 7991

## 2018-03-17 NOTE — H&P
History & Physical Exam - [address] [facility] 27 y o  female MRN: [id number]  Unit/Bed#: ED 10 Encounter: [id number]      Assessment/Plan:  1  Diabetic ketoacidosis with type 1 diabetes mellitus and history  · Admit to step-down, patient will require greater than a 2 midnight stay  · Initiate DKA protocol  · Monitor blood sugars q 1 hour  · Continue insulin and IV fluids as per DKA protocol  · Monitor electrolytes and replete as needed  2  Anion gap Metabolic acidosis secondary to 1   · Continue IV fluids as above  · Monitor metabolic status closely  · Will hold on bicarbonate drip for now  3  Active IV heroin abuse and crack cocaine abuse  · Monitor for withdrawal  · In light of leukocytosis will check blood cultures  Most recent echocardiogram on 02/14/2018 did not reveal valvular heart disease or any valvular vegetations  Will discuss with critical care attending need for repeat echocardiogram   · Obtain blood cultures  Monitor for signs of infection  Will check chest x-ray  4  Leukocytosis with a history of right thigh MRSA cellulitis  · This is most likely reactive secondary to vomiting continue to monitor for signs of infection  · Blood cultures x2 are pending  · MRSA culture pending  · Will need contact precautions  5  History hypertension  · Currently normotensive  · Patient reports not using hypertensive medications for greater than 2 weeks  · Continue to monitor closely  6  History hepatitis-C  · Monitor LFTs    Critical Care Time: 60 minutes  Documented critical care time excludes any procedures documented elsewhere  It also excludes any family updates    _____________________________________________________________________      HPI:    Dodie Patino is a 27 y o  female who presents with a chief complaint of nausea vomiting    She is known to have a history of type 1 diabetes on long-term insulin use, hep C, active heroin and cocaine use, gastroesophageal reflux disease, history of MRSA infection with abscess of her right thigh, and homelessness  She was recently seen here at [facility] in late January for influenza and in February 02/09/2018-2/16/2018 secondary to cellulitis with an abscess of her right thigh which was positive for MRSA  She now presents to [facility] for vomiting  She reports she started vomiting at by p m  on 03/16/2018 with unknown causative factors  She denies any recent sick contacts however she is living at a homeless shelter for the last 2 months as she is from [place] and was visiting here in [facility] and has been unable to return to [place] where she resides  She is a poor historian and elusive when questioned  She reports last using heroin on 03/16/2018 and reports last smoking crack cocaine approximately 2 days ago  She arrived to the emergency department with vomiting and felt that those symptoms were brought on by her heroin abuse  She reports injecting heroin into her neck on her right side daily  She also relays to me that she has not used her insulin in approximately 1 week  Upon evaluation in the emergency department she was found to have elevated blood sugars in the high 300 to high 400 range and was referred for admission secondary to DKA  Review of Systems:  CONSTITUTIONAL:  Denies weight gain or weight loss, denies fever or chills,  HEENT:  Eyes:  No diplopia or blurred vision  ENT:  No earache, sore throat or runny nose  CARDIOVASCULAR:  No pressure, squeezing, strangling, tightness, heaviness or aching about the chest, neck, axilla or epigastrium  RESPIRATORY:  No cough, shortness of breath, PND or orthopnea  GASTROINTESTINAL:  Positive nausea, vomiting  No diarrhea  GENITOURINARY:  No dysuria, frequency or urgency  MUSCULOSKELETAL:  As per HPI  SKIN:  No change in skin, hair or nails  NEUROLOGIC:  No paresthesias, fasciculations, seizures or weakness    PSYCHIATRIC:  No disorder of thought or mood   ENDOCRINE:  No heat or cold intolerance, polyuria or polydipsia  HEMATOLOGICAL:  No easy bruising or bleeding      Historical Information   Past Medical History:   Diagnosis Date    Cocaine abuse 3/17/2018    Diabetes mellitus (HCC)     GERD (gastroesophageal reflux disease)     Hepatitis C     Hypertension     Leukocytosis (leucocytosis) 3/17/2018    MRSA (methicillin resistant Staphylococcus aureus) 3/17/2018    Seizures ([address] )     last seizure Nov 2017     Past Surgical History:   Procedure Laterality Date    BACK SURGERY      lumbar     EYE SURGERY      bilateral    INCISION AND DRAINAGE ABSCESS / HEMATOMA OF BURSA / KNEE / THIGH Right      Social History   History   Alcohol Use No     History   Drug Use    Types: Heroin, "Crack" cocaine     Comment: yesterday     History   Smoking Status    Former Smoker    Packs/day: 0 50    Years: 2 00    Quit date: 3/17/2016   Smokeless Tobacco    Never Used       Family History:   History reviewed  No pertinent family history  Reports that her family history of her father is unknown in her past medical history of her mother is significant for lupus      Medications:  Pertinent medications were reviewed    Current Facility-Administered Medications:  insulin regular (HumuLIN R,NovoLIN R) infusion 0 1-30 Units/hr Intravenous Continuous Jeneal Coins V, PA-C Last Rate: 6 Units/hr (03/17/18 0907)   sodium chloride (PF) 3 mL Intravenous PRN Jeneal Coins V, PA-C    sodium chloride 1,000 mL Intravenous Once Jeneal Coins V, PA-C Last Rate: 1,000 mL (03/17/18 0852)   sodium chloride 250 mL/hr Intravenous Continuous Jeneal Coins V, PA-C          Allergies   Allergen Reactions    Tramadol     Amoxicillin Rash    Vicodin [Hydrocodone-Acetaminophen] Abdominal Pain       Vitals:   /65   Pulse (!) 112   Temp 98 7 °F (37 1 °C) (Oral)   Resp 19   Wt 63 6 kg (140 lb 3 4 oz)   LMP 03/15/2018   SpO2 100%   BMI 21 96 kg/m²   Body mass index is 21 96 kg/m²  SpO2: 100 %,   SpO2 Activity: At Rest,   O2 Device: None (Room air)      Intake/Output Summary (Last 24 hours) at 03/17/18 0943  Last data filed at 03/17/18 0851   Gross per 24 hour   Intake             1000 ml   Output                0 ml   Net             1000 ml     Invasive Devices     Peripheral Intravenous Line            Peripheral IV 03/17/18 Right Antecubital less than 1 day                Physical Exam:  Gen:  Appears older than stated age  HEENT:  Normocephalic, atraumatic, pupils are 3 and brisk bilaterally, extraocular movements are intact, no facial droop, no slurred speech  Oral mucosa is dry  Patient has no teeth  Neck:  Multiple small injection sites over the right side of her neck  Mitzi Knight No JVD, no adenopathy  Chest:  Lungs are clear to auscultation anterior posteriorly  Cor:  S1/S2, regular rate and rhythm, no murmur/rubs/gallops/click  Abd:  Soft, nontender, nondistended, positive bowel sounds x4 quadrants  Ext:  Moves all extremities with purpose, palpable pulses x4 extremities, no noted edema  Multiple old healed injection sites over 4 extremities  Neuro:  Cranial nerves 2-12 are grossly intact, GCS is 15, follows all commands  Skin:  Multiple healed injection sites on all 4 extremities and bilateral neck  Diagnostic Data:  Lab: I have personally reviewed pertinent lab results  ,   CBC:    Results from last 7 days  Lab Units 03/17/18  0621   WBC Thousand/uL 15 82*   HEMOGLOBIN g/dL 10 5*   HEMATOCRIT % 34 8   PLATELETS Thousands/uL 334      CMP: Lab Results   Component Value Date     03/17/2018    K 5 3 03/17/2018    CL 99 (L) 03/17/2018    CO2 13 (L) 03/17/2018    ANIONGAP 25 (H) 03/17/2018    BUN 23 03/17/2018    CREATININE 1 07 03/17/2018    GLUCOSE 478 (H) 03/17/2018    CALCIUM 9 2 03/17/2018    AST 39 03/17/2018    ALT 16 03/17/2018    ALKPHOS 124 (H) 03/17/2018    PROT 8 2 03/17/2018    BILITOT 0 73 03/17/2018    EGFR 68 03/17/2018     Lactic acid 1 2    ABG: 7 233/25 6/141 4/10 /15 3    Microbiology:  Blood cultures x2 are pending    Imagin2018 chest x-ray pending    Cardiac/EKG/telemetry/Echo:     Results from last 7 days  Lab Units 18  0621   TROPONIN I ng/mL <0 02   2018 echocardiogram reveals ; no valvular vegetation seen  Systolic function was normal   Ejection fraction was estimated to be 65%  There were no regional wall motion abnormalities  There was mild mitral regurgitation  Left atrium size was normal   Right atrium size was normal   Mitral valve revealed mild regurgitation  Aortic valve without stenosis or regurgitation  Tricuspid valve without evidence of stenosis or regurgitation  Pulmonic valve without regurgitation  No pericardial effusion  2018 EKG reveals normal sinus rhythm/sinus tachycardia      VTE Prophylaxis:  Heparin/vena dynes    Code Status:  Full code    PREET Terry    Portions of the record may have been created with voice recognition software  Occasional wrong word or "sound a like" substitutions may have occurred due to the inherent limitations of voice recognition software  Read the chart carefully and recognize, using context, where substitutions have occurred

## 2018-03-17 NOTE — ED NOTES
Report called to [facility] in ICU, aware that pt will be to floor shortly        Raven Sales RN  03/17/18 4566

## 2018-03-17 NOTE — ED NOTES
Multiple attempts made at IV access by nursing were unsuccessful  Danielle Mari PA-C made aware  Dr Tico Gil at bedside attempting IV access via [address] guidance  Successful attempt made by Dr Tico Gil now  IVF (1st liter) resumed        Ana Velasquez RN  03/17/18 2593

## 2018-03-17 NOTE — ED NOTES
[employer] officer Valerie Cooperw at bedside to speak with patient and confiscate drugs and drug paraphernalia  Per Officer Ad needles OK to be disposed of in sharps container, along with pills  He did take heroin packets and pipe into his possession        General Godinez, STEVE  03/17/18 9755

## 2018-03-18 LAB
ANION GAP SERPL CALCULATED.3IONS-SCNC: 10 MMOL/L (ref 4–13)
ANION GAP SERPL CALCULATED.3IONS-SCNC: 14 MMOL/L (ref 4–13)
ANION GAP SERPL CALCULATED.3IONS-SCNC: 21 MMOL/L (ref 4–13)
ANION GAP SERPL CALCULATED.3IONS-SCNC: 24 MMOL/L (ref 4–13)
ANION GAP SERPL CALCULATED.3IONS-SCNC: 9 MMOL/L (ref 4–13)
BUN SERPL-MCNC: 11 MG/DL (ref 5–25)
BUN SERPL-MCNC: 12 MG/DL (ref 5–25)
BUN SERPL-MCNC: 7 MG/DL (ref 5–25)
BUN SERPL-MCNC: 7 MG/DL (ref 5–25)
BUN SERPL-MCNC: 9 MG/DL (ref 5–25)
CALCIUM SERPL-MCNC: 7.8 MG/DL (ref 8.3–10.1)
CALCIUM SERPL-MCNC: 8 MG/DL (ref 8.3–10.1)
CALCIUM SERPL-MCNC: 8.1 MG/DL (ref 8.3–10.1)
CALCIUM SERPL-MCNC: 8.2 MG/DL (ref 8.3–10.1)
CALCIUM SERPL-MCNC: 8.5 MG/DL (ref 8.3–10.1)
CHLORIDE SERPL-SCNC: 105 MMOL/L (ref 100–108)
CHLORIDE SERPL-SCNC: 110 MMOL/L (ref 100–108)
CHLORIDE SERPL-SCNC: 113 MMOL/L (ref 100–108)
CHLORIDE SERPL-SCNC: 113 MMOL/L (ref 100–108)
CHLORIDE SERPL-SCNC: 114 MMOL/L (ref 100–108)
CO2 SERPL-SCNC: 11 MMOL/L (ref 21–32)
CO2 SERPL-SCNC: 11 MMOL/L (ref 21–32)
CO2 SERPL-SCNC: 17 MMOL/L (ref 21–32)
CO2 SERPL-SCNC: 19 MMOL/L (ref 21–32)
CO2 SERPL-SCNC: 21 MMOL/L (ref 21–32)
CREAT SERPL-MCNC: 0.71 MG/DL (ref 0.6–1.3)
CREAT SERPL-MCNC: 0.73 MG/DL (ref 0.6–1.3)
CREAT SERPL-MCNC: 0.75 MG/DL (ref 0.6–1.3)
CREAT SERPL-MCNC: 0.84 MG/DL (ref 0.6–1.3)
CREAT SERPL-MCNC: 0.96 MG/DL (ref 0.6–1.3)
ERYTHROCYTE [DISTWIDTH] IN BLOOD BY AUTOMATED COUNT: 18.2 % (ref 11.6–15.1)
GFR SERPL CREATININE-BSD FRML MDRD: 104 ML/MIN/1.73SQ M
GFR SERPL CREATININE-BSD FRML MDRD: 108 ML/MIN/1.73SQ M
GFR SERPL CREATININE-BSD FRML MDRD: 111 ML/MIN/1.73SQ M
GFR SERPL CREATININE-BSD FRML MDRD: 77 ML/MIN/1.73SQ M
GFR SERPL CREATININE-BSD FRML MDRD: 91 ML/MIN/1.73SQ M
GLUCOSE SERPL-MCNC: 101 MG/DL (ref 65–140)
GLUCOSE SERPL-MCNC: 104 MG/DL (ref 65–140)
GLUCOSE SERPL-MCNC: 113 MG/DL (ref 65–140)
GLUCOSE SERPL-MCNC: 137 MG/DL (ref 65–140)
GLUCOSE SERPL-MCNC: 140 MG/DL (ref 65–140)
GLUCOSE SERPL-MCNC: 151 MG/DL (ref 65–140)
GLUCOSE SERPL-MCNC: 167 MG/DL (ref 65–140)
GLUCOSE SERPL-MCNC: 191 MG/DL (ref 65–140)
GLUCOSE SERPL-MCNC: 208 MG/DL (ref 65–140)
GLUCOSE SERPL-MCNC: 208 MG/DL (ref 65–140)
GLUCOSE SERPL-MCNC: 251 MG/DL (ref 65–140)
GLUCOSE SERPL-MCNC: 255 MG/DL (ref 65–140)
GLUCOSE SERPL-MCNC: 257 MG/DL (ref 65–140)
GLUCOSE SERPL-MCNC: 270 MG/DL (ref 65–140)
GLUCOSE SERPL-MCNC: 284 MG/DL (ref 65–140)
GLUCOSE SERPL-MCNC: 286 MG/DL (ref 65–140)
GLUCOSE SERPL-MCNC: 41 MG/DL (ref 65–140)
GLUCOSE SERPL-MCNC: 414 MG/DL (ref 65–140)
GLUCOSE SERPL-MCNC: 437 MG/DL (ref 65–140)
GLUCOSE SERPL-MCNC: 46 MG/DL (ref 65–140)
GLUCOSE SERPL-MCNC: 57 MG/DL (ref 65–140)
GLUCOSE SERPL-MCNC: 60 MG/DL (ref 65–140)
GLUCOSE SERPL-MCNC: 67 MG/DL (ref 65–140)
GLUCOSE SERPL-MCNC: 87 MG/DL (ref 65–140)
GLUCOSE SERPL-MCNC: 89 MG/DL (ref 65–140)
HCT VFR BLD AUTO: 33.3 % (ref 34.8–46.1)
HGB BLD-MCNC: 9.9 G/DL (ref 11.5–15.4)
MAGNESIUM SERPL-MCNC: 2.1 MG/DL (ref 1.6–2.6)
MAGNESIUM SERPL-MCNC: 2.2 MG/DL (ref 1.6–2.6)
MAGNESIUM SERPL-MCNC: 2.3 MG/DL (ref 1.6–2.6)
MAGNESIUM SERPL-MCNC: 2.5 MG/DL (ref 1.6–2.6)
MCH RBC QN AUTO: 26.3 PG (ref 26.8–34.3)
MCHC RBC AUTO-ENTMCNC: 29.7 G/DL (ref 31.4–37.4)
MCV RBC AUTO: 89 FL (ref 82–98)
PHOSPHATE SERPL-MCNC: 1.7 MG/DL (ref 2.7–4.5)
PHOSPHATE SERPL-MCNC: 2 MG/DL (ref 2.7–4.5)
PHOSPHATE SERPL-MCNC: 2 MG/DL (ref 2.7–4.5)
PHOSPHATE SERPL-MCNC: 2.4 MG/DL (ref 2.7–4.5)
PLATELET # BLD AUTO: 367 THOUSANDS/UL (ref 149–390)
PMV BLD AUTO: 10.6 FL (ref 8.9–12.7)
POTASSIUM SERPL-SCNC: 3.4 MMOL/L (ref 3.5–5.3)
POTASSIUM SERPL-SCNC: 3.6 MMOL/L (ref 3.5–5.3)
POTASSIUM SERPL-SCNC: 3.9 MMOL/L (ref 3.5–5.3)
POTASSIUM SERPL-SCNC: 4 MMOL/L (ref 3.5–5.3)
POTASSIUM SERPL-SCNC: 4.8 MMOL/L (ref 3.5–5.3)
RBC # BLD AUTO: 3.76 MILLION/UL (ref 3.81–5.12)
SODIUM SERPL-SCNC: 140 MMOL/L (ref 136–145)
SODIUM SERPL-SCNC: 140 MMOL/L (ref 136–145)
SODIUM SERPL-SCNC: 142 MMOL/L (ref 136–145)
SODIUM SERPL-SCNC: 145 MMOL/L (ref 136–145)
SODIUM SERPL-SCNC: 145 MMOL/L (ref 136–145)
WBC # BLD AUTO: 16.75 THOUSAND/UL (ref 4.31–10.16)

## 2018-03-18 PROCEDURE — 85027 COMPLETE CBC AUTOMATED: CPT | Performed by: NURSE PRACTITIONER

## 2018-03-18 PROCEDURE — 83735 ASSAY OF MAGNESIUM: CPT | Performed by: NURSE PRACTITIONER

## 2018-03-18 PROCEDURE — 80048 BASIC METABOLIC PNL TOTAL CA: CPT | Performed by: NURSE PRACTITIONER

## 2018-03-18 PROCEDURE — 82948 REAGENT STRIP/BLOOD GLUCOSE: CPT

## 2018-03-18 PROCEDURE — 84100 ASSAY OF PHOSPHORUS: CPT | Performed by: NURSE PRACTITIONER

## 2018-03-18 PROCEDURE — 99233 SBSQ HOSP IP/OBS HIGH 50: CPT | Performed by: INTERNAL MEDICINE

## 2018-03-18 RX ORDER — SODIUM CHLORIDE 9 MG/ML
250 INJECTION, SOLUTION INTRAVENOUS CONTINUOUS
Status: DISCONTINUED | OUTPATIENT
Start: 2018-03-18 | End: 2018-03-18

## 2018-03-18 RX ORDER — MORPHINE SULFATE 2 MG/ML
1 INJECTION, SOLUTION INTRAMUSCULAR; INTRAVENOUS ONCE
Status: COMPLETED | OUTPATIENT
Start: 2018-03-18 | End: 2018-03-18

## 2018-03-18 RX ORDER — SODIUM CHLORIDE 9 MG/ML
2000 INJECTION, SOLUTION INTRAVENOUS CONTINUOUS
Status: DISCONTINUED | OUTPATIENT
Start: 2018-03-18 | End: 2018-03-18

## 2018-03-18 RX ORDER — INSULIN GLARGINE 100 [IU]/ML
20 INJECTION, SOLUTION SUBCUTANEOUS
Status: DISCONTINUED | OUTPATIENT
Start: 2018-03-19 | End: 2018-03-18

## 2018-03-18 RX ORDER — DEXTROSE MONOHYDRATE 25 G/50ML
INJECTION, SOLUTION INTRAVENOUS
Status: DISPENSED
Start: 2018-03-18 | End: 2018-03-19

## 2018-03-18 RX ORDER — INSULIN GLARGINE 100 [IU]/ML
20 INJECTION, SOLUTION SUBCUTANEOUS EVERY 12 HOURS
Status: DISCONTINUED | OUTPATIENT
Start: 2018-03-18 | End: 2018-03-18

## 2018-03-18 RX ORDER — SODIUM CHLORIDE 9 MG/ML
500 INJECTION, SOLUTION INTRAVENOUS CONTINUOUS
Status: DISCONTINUED | OUTPATIENT
Start: 2018-03-18 | End: 2018-03-18

## 2018-03-18 RX ORDER — DEXTROSE AND SODIUM CHLORIDE 5; .9 G/100ML; G/100ML
250 INJECTION, SOLUTION INTRAVENOUS CONTINUOUS
Status: DISCONTINUED | OUTPATIENT
Start: 2018-03-18 | End: 2018-03-18

## 2018-03-18 RX ORDER — MORPHINE SULFATE 2 MG/ML
1 INJECTION, SOLUTION INTRAMUSCULAR; INTRAVENOUS EVERY 4 HOURS PRN
Status: DISCONTINUED | OUTPATIENT
Start: 2018-03-18 | End: 2018-03-20 | Stop reason: HOSPADM

## 2018-03-18 RX ORDER — INSULIN GLARGINE 100 [IU]/ML
20 INJECTION, SOLUTION SUBCUTANEOUS
Status: DISCONTINUED | OUTPATIENT
Start: 2018-03-18 | End: 2018-03-19

## 2018-03-18 RX ORDER — LORAZEPAM 2 MG/ML
0.5 INJECTION INTRAMUSCULAR EVERY 6 HOURS PRN
Status: DISCONTINUED | OUTPATIENT
Start: 2018-03-18 | End: 2018-03-20 | Stop reason: HOSPADM

## 2018-03-18 RX ORDER — DEXTROSE AND SODIUM CHLORIDE 5; .45 G/100ML; G/100ML
250 INJECTION, SOLUTION INTRAVENOUS CONTINUOUS
Status: DISCONTINUED | OUTPATIENT
Start: 2018-03-18 | End: 2018-03-18

## 2018-03-18 RX ADMIN — HEPARIN SODIUM 5000 UNITS: 5000 INJECTION, SOLUTION INTRAVENOUS; SUBCUTANEOUS at 15:08

## 2018-03-18 RX ADMIN — DEXTROSE AND SODIUM CHLORIDE 250 ML/HR: 5; .45 INJECTION, SOLUTION INTRAVENOUS at 18:39

## 2018-03-18 RX ADMIN — GABAPENTIN 100 MG: 100 CAPSULE ORAL at 21:24

## 2018-03-18 RX ADMIN — MORPHINE SULFATE 1 MG: 2 INJECTION, SOLUTION INTRAMUSCULAR; INTRAVENOUS at 16:59

## 2018-03-18 RX ADMIN — GABAPENTIN 100 MG: 100 CAPSULE ORAL at 15:08

## 2018-03-18 RX ADMIN — LORAZEPAM 0.5 MG: 2 INJECTION INTRAMUSCULAR; INTRAVENOUS at 11:22

## 2018-03-18 RX ADMIN — ONDANSETRON 4 MG: 2 INJECTION INTRAMUSCULAR; INTRAVENOUS at 09:23

## 2018-03-18 RX ADMIN — PANTOPRAZOLE SODIUM 40 MG: 40 TABLET, DELAYED RELEASE ORAL at 06:25

## 2018-03-18 RX ADMIN — SODIUM CHLORIDE 1000 ML: 0.9 INJECTION, SOLUTION INTRAVENOUS at 13:20

## 2018-03-18 RX ADMIN — MORPHINE SULFATE 1 MG: 2 INJECTION, SOLUTION INTRAMUSCULAR; INTRAVENOUS at 09:44

## 2018-03-18 RX ADMIN — SODIUM CHLORIDE 7 UNITS/HR: 9 INJECTION, SOLUTION INTRAVENOUS at 11:36

## 2018-03-18 RX ADMIN — DEXTROSE AND SODIUM CHLORIDE 250 ML/HR: 5; .45 INJECTION, SOLUTION INTRAVENOUS at 10:28

## 2018-03-18 RX ADMIN — SODIUM CHLORIDE 250 ML/HR: 0.9 INJECTION, SOLUTION INTRAVENOUS at 09:20

## 2018-03-18 RX ADMIN — SODIUM CHLORIDE 2000 ML/HR: 0.9 INJECTION, SOLUTION INTRAVENOUS at 08:02

## 2018-03-18 RX ADMIN — SODIUM CHLORIDE 7 UNITS/HR: 9 INJECTION, SOLUTION INTRAVENOUS at 07:42

## 2018-03-18 RX ADMIN — INSULIN GLARGINE 20 UNITS: 100 INJECTION, SOLUTION SUBCUTANEOUS at 22:16

## 2018-03-18 RX ADMIN — HEPARIN SODIUM 5000 UNITS: 5000 INJECTION, SOLUTION INTRAVENOUS; SUBCUTANEOUS at 21:24

## 2018-03-18 RX ADMIN — LORAZEPAM 0.5 MG: 2 INJECTION INTRAMUSCULAR; INTRAVENOUS at 17:22

## 2018-03-18 RX ADMIN — ONDANSETRON 4 MG: 2 INJECTION INTRAMUSCULAR; INTRAVENOUS at 01:59

## 2018-03-18 RX ADMIN — GABAPENTIN 100 MG: 100 CAPSULE ORAL at 09:19

## 2018-03-18 RX ADMIN — MORPHINE SULFATE 1 MG: 2 INJECTION, SOLUTION INTRAMUSCULAR; INTRAVENOUS at 23:35

## 2018-03-18 RX ADMIN — VENLAFAXINE HYDROCHLORIDE 37.5 MG: 37.5 CAPSULE, EXTENDED RELEASE ORAL at 09:19

## 2018-03-18 RX ADMIN — SODIUM CHLORIDE 1000 ML: 0.9 INJECTION, SOLUTION INTRAVENOUS at 06:26

## 2018-03-18 RX ADMIN — MORPHINE SULFATE 1 MG: 2 INJECTION, SOLUTION INTRAMUSCULAR; INTRAVENOUS at 03:09

## 2018-03-18 RX ADMIN — HEPARIN SODIUM 5000 UNITS: 5000 INJECTION, SOLUTION INTRAVENOUS; SUBCUTANEOUS at 06:24

## 2018-03-18 NOTE — PROGRESS NOTES
Progress Note - Critical Care   Raleigh Merchant 27 y o  female MRN: [id number]  Unit/Bed#: ICU 08 Encounter: [id number]    Assessment/Plan:  1  Diabetic ketoacidosis with type 1 diabetes mellitus  · Anion gap metabolic acidosis improved yesterday and was switched to lantus and ssi insulin coverage  · This morning in DKA again will give 2 liters normal saline bolus  · Start on insulin infusion  · Pending endocrinology consult  · NPO since blood glucose and anion gap are elevated   2  IV heroin abuse and crack cocaine abuse with w/d symptoms  · Still with nausea despite given Zofran and Phenergan   · Suspect this is due to w/d symptoms with the tachycardia  · Last used 3/16  3  Leukocytosis with history of MRSA cellulitis on right thigh  · Hold off on antibiotics  · Blood cultures and MRSA culture pending  4  History of hepatitis C  5  Hypertension   · Does not known her home medications  · Normotensive at this time  _____________________________________________________________________    HPI/24hr events:   Anion gap metabolic acidosis closed last night and placed on Lantus   This morning back in DKA and started on fluids and insulin    Medications:    Current Facility-Administered Medications:  acetaminophen 650 mg Oral Q6H PRN PREET Quinteros   dextrose 5 % and sodium chloride 0 45 % 250 mL/hr Intravenous Continuous PREET May   gabapentin 100 mg Oral TID PREET Armstrong   heparin (porcine) 5,000 Units Subcutaneous [facility] Bk MckaydayPREET   insulin regular (HumuLIN R,NovoLIN R) infusion 0 1-30 Units/hr Intravenous Continuous PREET May   morphine injection 1 mg Intravenous Q4H PRN PREET Armstrong   ondansetron 4 mg Intravenous Q6H PRN PREET May   pantoprazole 40 mg Oral Daily Before Breakfast Iliana Lim MD   sodium chloride 2,000 mL Intravenous Once PREET May   sodium chloride 2,000 mL/hr Intravenous Continuous PREET Armstrong   Followed by       sodium chloride 500 mL/hr Intravenous Continuous Darline K Bilofsky, CRNP   Followed by       sodium chloride 250 mL/hr Intravenous Continuous Darline K Bilofsky, CRNP   sodium chloride 2,000 mL/hr Intravenous Continuous Darline K Bilofsky, CRNP   Followed by       sodium chloride 500 mL/hr Intravenous Continuous Darline K Bilofsky, CRNP   Followed by       sodium chloride 250 mL/hr Intravenous Continuous Jacob Se Bilofsky, CRNP   venlafaxine 37 5 mg Oral Daily Bk Pablito, CRNP         dextrose 5 % and sodium chloride 0 45 % 250 mL/hr   insulin regular (HumuLIN R,NovoLIN R) infusion 0 1-30 Units/hr   sodium chloride 2,000 mL/hr   Followed by    sodium chloride 500 mL/hr   Followed by    sodium chloride 250 mL/hr   sodium chloride 2,000 mL/hr   Followed by    sodium chloride 500 mL/hr   Followed by    sodium chloride 250 mL/hr         Physical exam:  Vitals: Body mass index is 23 81 kg/m²  Blood pressure 133/62, pulse 104, temperature 98 3 °F (36 8 °C), temperature source Temporal, resp  rate 18, height 5' 5" (1 651 m), weight 64 9 kg (143 lb 1 3 oz), last menstrual period 03/15/2018, SpO2 99 %  ,  Temp  Min: 98 3 °F (36 8 °C)  Max: 99 2 °F (37 3 °C)  IBW: 57 kg    SpO2: 99 %  SpO2 Activity: At Rest  O2 Device: None (Room air)      Intake/Output Summary (Last 24 hours) at 03/18/18 0626  Last data filed at 03/18/18 2118   Gross per 24 hour   Intake          4844 33 ml   Output             2000 ml   Net          2844 33 ml       Invasive/non-invasive ventilation settings:   Respiratory    Lab Data (Last 4 hours)    None         O2/Vent Data (Last 4 hours)    None              Invasive Devices     Peripheral Intravenous Line            Peripheral IV 03/17/18 Left Forearm less than 1 day                  Physical Exam:  Gen:  Alert and oriented, NAD  HEENT:  PERRL, EOMI, normocephalic, atraumatic, o/p clear  Neck: Supple, no JVD, no adenopathy  Chest:  CTA  Cor:  ST, no murmurs, rubs, or gallops  Abd:  Soft, mild tenderness, non-distended, bowel sounds present  Ext:  ELLIS, no edema  Neuro:  CN II-XII grossly intact  Skin: Warm, dry, intact      Diagnostic Data:  Lab: I have personally reviewed pertinent lab results  CBC:     Results from last 7 days  Lab Units 03/17/18  1210 03/17/18  0621   WBC Thousand/uL 16 82* 15 82*   HEMOGLOBIN g/dL 10 9* 10 5*   HEMATOCRIT % 35 7 34 8   PLATELETS Thousands/uL 380 334       CMP:     Results from last 7 days  Lab Units 03/18/18  0453 03/17/18  1556 03/17/18  1210 03/17/18  0621   SODIUM mmol/L 140 143 143 137   POTASSIUM mmol/L 4 8 3 6 4 2 5 3   CHLORIDE mmol/L 105 112* 108 99*   CO2 mmol/L 11* 21 17* 13*   BUN mg/dL 12 15 19 23   CREATININE mg/dL 0 84 0 86 1 11 1 07   CALCIUM mg/dL 8 5 7 8* 8 6 9 2   TOTAL PROTEIN g/dL  --   --   --  8 2   BILIRUBIN TOTAL mg/dL  --   --   --  0 73   ALK PHOS U/L  --   --   --  124*   ALT U/L  --   --   --  16   AST U/L  --   --   --  39   GLUCOSE RANDOM mg/dL 437* 141* 175* 478*     PT/INR:   No results found for: PT, INR,   Magnesium:     Results from last 7 days  Lab Units 03/17/18  1210   MAGNESIUM mg/dL 2 4     Phosphorous:     Results from last 7 days  Lab Units 03/17/18  1210   PHOSPHORUS mg/dL 2 5*       Microbiology:        Imaging:  No new imaging    Cardiac lab/EKG/telemetry/ECHO:   NSR    VTE Prophylaxis: Heparin    Code Status: Level 1 - Full Code    PREET Pacheco    Portions of the record may have been created with voice recognition software  Occasional wrong word or "sound a like" substitutions may have occurred due to the inherent limitations of voice recognition software  Read the chart carefully and recognize, using context, where substitutions have occurred

## 2018-03-19 LAB
ANION GAP SERPL CALCULATED.3IONS-SCNC: 7 MMOL/L (ref 4–13)
ANION GAP SERPL CALCULATED.3IONS-SCNC: 9 MMOL/L (ref 4–13)
BUN SERPL-MCNC: 5 MG/DL (ref 5–25)
BUN SERPL-MCNC: 9 MG/DL (ref 5–25)
CALCIUM SERPL-MCNC: 7.9 MG/DL (ref 8.3–10.1)
CALCIUM SERPL-MCNC: 8 MG/DL (ref 8.3–10.1)
CHLORIDE SERPL-SCNC: 103 MMOL/L (ref 100–108)
CHLORIDE SERPL-SCNC: 109 MMOL/L (ref 100–108)
CO2 SERPL-SCNC: 23 MMOL/L (ref 21–32)
CO2 SERPL-SCNC: 25 MMOL/L (ref 21–32)
CREAT SERPL-MCNC: 0.54 MG/DL (ref 0.6–1.3)
CREAT SERPL-MCNC: 0.85 MG/DL (ref 0.6–1.3)
GFR SERPL CREATININE-BSD FRML MDRD: 124 ML/MIN/1.73SQ M
GFR SERPL CREATININE-BSD FRML MDRD: 90 ML/MIN/1.73SQ M
GLUCOSE SERPL-MCNC: 127 MG/DL (ref 65–140)
GLUCOSE SERPL-MCNC: 180 MG/DL (ref 65–140)
GLUCOSE SERPL-MCNC: 308 MG/DL (ref 65–140)
GLUCOSE SERPL-MCNC: 358 MG/DL (ref 65–140)
GLUCOSE SERPL-MCNC: 436 MG/DL (ref 65–140)
GLUCOSE SERPL-MCNC: 454 MG/DL (ref 65–140)
GLUCOSE SERPL-MCNC: 61 MG/DL (ref 65–140)
GLUCOSE SERPL-MCNC: 86 MG/DL (ref 65–140)
MRSA NOSE QL CULT: NORMAL
POTASSIUM SERPL-SCNC: 3.2 MMOL/L (ref 3.5–5.3)
POTASSIUM SERPL-SCNC: 4.2 MMOL/L (ref 3.5–5.3)
SODIUM SERPL-SCNC: 135 MMOL/L (ref 136–145)
SODIUM SERPL-SCNC: 141 MMOL/L (ref 136–145)

## 2018-03-19 PROCEDURE — 80048 BASIC METABOLIC PNL TOTAL CA: CPT | Performed by: NURSE PRACTITIONER

## 2018-03-19 PROCEDURE — 99232 SBSQ HOSP IP/OBS MODERATE 35: CPT | Performed by: INTERNAL MEDICINE

## 2018-03-19 PROCEDURE — 82948 REAGENT STRIP/BLOOD GLUCOSE: CPT

## 2018-03-19 PROCEDURE — 99223 1ST HOSP IP/OBS HIGH 75: CPT | Performed by: INTERNAL MEDICINE

## 2018-03-19 RX ORDER — INSULIN GLARGINE 100 [IU]/ML
40 INJECTION, SOLUTION SUBCUTANEOUS
Status: DISCONTINUED | OUTPATIENT
Start: 2018-03-19 | End: 2018-03-19

## 2018-03-19 RX ORDER — POTASSIUM CHLORIDE 20 MEQ/1
40 TABLET, EXTENDED RELEASE ORAL ONCE
Status: COMPLETED | OUTPATIENT
Start: 2018-03-19 | End: 2018-03-19

## 2018-03-19 RX ORDER — INSULIN GLARGINE 100 [IU]/ML
35 INJECTION, SOLUTION SUBCUTANEOUS
Status: DISCONTINUED | OUTPATIENT
Start: 2018-03-19 | End: 2018-03-20

## 2018-03-19 RX ADMIN — MORPHINE SULFATE 1 MG: 2 INJECTION, SOLUTION INTRAMUSCULAR; INTRAVENOUS at 05:50

## 2018-03-19 RX ADMIN — HEPARIN SODIUM 5000 UNITS: 5000 INJECTION, SOLUTION INTRAVENOUS; SUBCUTANEOUS at 14:19

## 2018-03-19 RX ADMIN — POTASSIUM CHLORIDE 40 MEQ: 1500 TABLET, EXTENDED RELEASE ORAL at 08:00

## 2018-03-19 RX ADMIN — VENLAFAXINE HYDROCHLORIDE 37.5 MG: 37.5 CAPSULE, EXTENDED RELEASE ORAL at 08:00

## 2018-03-19 RX ADMIN — INSULIN LISPRO 5 UNITS: 100 INJECTION, SOLUTION INTRAVENOUS; SUBCUTANEOUS at 16:06

## 2018-03-19 RX ADMIN — INSULIN LISPRO 7 UNITS: 100 INJECTION, SOLUTION INTRAVENOUS; SUBCUTANEOUS at 16:06

## 2018-03-19 RX ADMIN — GABAPENTIN 100 MG: 100 CAPSULE ORAL at 08:00

## 2018-03-19 RX ADMIN — PANTOPRAZOLE SODIUM 40 MG: 40 TABLET, DELAYED RELEASE ORAL at 06:19

## 2018-03-19 RX ADMIN — LORAZEPAM 0.5 MG: 2 INJECTION INTRAMUSCULAR; INTRAVENOUS at 14:19

## 2018-03-19 RX ADMIN — INSULIN LISPRO 3 UNITS: 100 INJECTION, SOLUTION INTRAVENOUS; SUBCUTANEOUS at 21:38

## 2018-03-19 RX ADMIN — LORAZEPAM 0.5 MG: 2 INJECTION INTRAMUSCULAR; INTRAVENOUS at 20:35

## 2018-03-19 RX ADMIN — INSULIN LISPRO 7 UNITS: 100 INJECTION, SOLUTION INTRAVENOUS; SUBCUTANEOUS at 11:40

## 2018-03-19 RX ADMIN — ACETAMINOPHEN 650 MG: 325 TABLET, FILM COATED ORAL at 21:38

## 2018-03-19 RX ADMIN — HEPARIN SODIUM 5000 UNITS: 5000 INJECTION, SOLUTION INTRAVENOUS; SUBCUTANEOUS at 05:44

## 2018-03-19 RX ADMIN — MORPHINE SULFATE 1 MG: 2 INJECTION, SOLUTION INTRAMUSCULAR; INTRAVENOUS at 17:59

## 2018-03-19 RX ADMIN — INSULIN LISPRO 4 UNITS: 100 INJECTION, SOLUTION INTRAVENOUS; SUBCUTANEOUS at 11:40

## 2018-03-19 RX ADMIN — INSULIN LISPRO 1 UNITS: 100 INJECTION, SOLUTION INTRAVENOUS; SUBCUTANEOUS at 02:07

## 2018-03-19 RX ADMIN — LORAZEPAM 0.5 MG: 2 INJECTION INTRAMUSCULAR; INTRAVENOUS at 07:54

## 2018-03-19 RX ADMIN — MORPHINE SULFATE 1 MG: 2 INJECTION, SOLUTION INTRAMUSCULAR; INTRAVENOUS at 09:52

## 2018-03-19 RX ADMIN — GABAPENTIN 100 MG: 100 CAPSULE ORAL at 20:34

## 2018-03-19 RX ADMIN — ACETAMINOPHEN 650 MG: 325 TABLET, FILM COATED ORAL at 15:51

## 2018-03-19 RX ADMIN — MORPHINE SULFATE 1 MG: 2 INJECTION, SOLUTION INTRAMUSCULAR; INTRAVENOUS at 23:44

## 2018-03-19 RX ADMIN — MORPHINE SULFATE 1 MG: 2 INJECTION, SOLUTION INTRAMUSCULAR; INTRAVENOUS at 13:51

## 2018-03-19 RX ADMIN — INSULIN GLARGINE 35 UNITS: 100 INJECTION, SOLUTION SUBCUTANEOUS at 21:37

## 2018-03-19 RX ADMIN — GABAPENTIN 100 MG: 100 CAPSULE ORAL at 15:51

## 2018-03-19 NOTE — CASE MANAGEMENT
Initial Clinical Review    Admission: Date/Time/Statement: 3/17/18 @ 0844     Orders Placed This Encounter   Procedures    Inpatient Admission (expected length of stay for this patient is greater than two midnights)     Standing Status:   Standing     Number of Occurrences:   1     Order Specific Question:   Admitting Physician     Answer:   Genevieve Suresh     Order Specific Question:   Level of Care     Answer:   Level 1 Stepdown [13]     Order Specific Question:   Estimated length of stay     Answer:   More than 2 Midnights     Order Specific Question:   Certification     Answer:   I certify that inpatient services are medically necessary for this patient for a duration of greater than two midnights  See H&P and MD Progress Notes for additional information about the patient's course of treatment  ED: Date/Time/Mode of Arrival:   ED Arrival Information     Expected Arrival Acuity Means of Arrival Escorted By Service Admission Type    - 3/17/2018 04:42 Urgent Ambulance [facility] Urgent    Arrival Complaint    Vomiting          Chief Complaint:   Chief Complaint   Patient presents with    Vomiting     Pt c/o n/v since 1700  History of Illness:    Mauri Cano is a 27 y o  female who presents with a chief complaint of nausea vomiting  She is known to have a history of type 1 diabetes on long-term insulin use, hep C, active heroin and cocaine use, gastroesophageal reflux disease, history of MRSA infection with abscess of her right thigh, and homelessness  She was recently seen here at [facility] in late January for influenza and in February 02/09/2018-2/16/2018 secondary to cellulitis with an abscess of her right thigh which was positive for MRSA  She now presents to [facility] for vomiting  She reports she started vomiting at by p m  on 03/16/2018 with unknown causative factors    She denies any recent sick contacts however she is living at a homeless shelter for the last 2 months as she is from [place] and was visiting here in [facility] and has been unable to return to [place] where she resides  She is a poor historian and elusive when questioned  She reports last using heroin on 03/16/2018 and reports last smoking crack cocaine approximately 2 days ago  She arrived to the emergency department with vomiting and felt that those symptoms were brought on by her heroin abuse  She reports injecting heroin into her neck on her right side daily  She also relays to me that she has not used her insulin in approximately 1 week  Upon evaluation in the emergency department she was found to have elevated blood sugars in the high 300 to high 400 range and was referred for admission secondary to DKA      ED Vital Signs:   ED Triage Vitals [03/17/18 0443]   Temperature Pulse Respirations Blood Pressure SpO2   98 7 °F (37 1 °C) (!) 114 16 118/57 100 %      Temp Source Heart Rate Source Patient Position - Orthostatic VS BP Location FiO2 (%)   Oral Monitor Lying Right arm --      Pain Score       4        Wt Readings from Last 1 Encounters:   03/17/18 64 9 kg (143 lb 1 3 oz)       Vital Signs (abnormal):    above    Abnormal Labs/Diagnostic Test Results:   Acetone   bld  3+  ABG    Ph   7 233    PCO2    25 6    PO2   141 4       HCO3   10 6  UDS   +  Cocaine     + opiate  AG   25  BS  478  Co2   13  Alk phos  124  WBC   15 82  Abs   neutro   13 08  CXR:  NAD    ED Treatment:   Medication Administration from 03/17/2018 0442 to 03/17/2018 1055       Date/Time Order Dose Route Action Action by Comments     03/17/2018 0508 ondansetron (ZOFRAN-ODT) dispersible tablet 4 mg 4 mg Oral Given Cassie Moreau RN      03/17/2018 0851 sodium chloride 0 9 % bolus 1,000 mL 0 mL Intravenous Stopped General Forward, RN      03/17/2018 0758 sodium chloride 0 9 % bolus 1,000 mL 1,000 mL Intravenous Restarted General Forward, RN      03/17/2018 0710 sodium chloride 0 9 % bolus 1,000 mL 0 mL Intravenous Hold Marisabel Oliveros RN      03/17/2018 9459 sodium chloride 0 9 % bolus 1,000 mL 1,000 mL Intravenous New Bag Marisabel Oliveros RN started by IRAM Lanier PA-C     03/17/2018 0957 sodium chloride 0 9 % bolus 1,000 mL 0 mL Intravenous Stopped Marisabel Oliveros RN      03/17/2018 9208 sodium chloride 0 9 % bolus 1,000 mL 1,000 mL Intravenous [address] Marisabel Oliveros RN      03/17/2018 0748 insulin lispro (HumaLOG) 100 units/mL subcutaneous injection 5 Units 5 Units Subcutaneous Given Marisabel Oliveros RN      03/17/2018 0830 insulin regular 3 Units in sodium chloride 0 9 % 30 mL IV syringe   Intravenous Canceled Entry Marisabel Oliveros RN      03/17/2018 9439 insulin regular (HumuLIN R,NovoLIN R) 1 Units/mL in sodium chloride 0 9 % 100 mL infusion 6 Units/hr Intravenous [address] Marisabel Oliveros RN      03/17/2018 1002 sodium chloride 0 9 % infusion 250 mL/hr Intravenous [address] Marisabel Oliveros RN      03/17/2018 1030 sodium chloride 0 9 % infusion 0 mL/hr Intravenous Stopped Conrado Larson RN           Past Medical/Surgical History:    Active Ambulatory Problems     Diagnosis Date Noted    Hepatitis C     [facility] with [facility] 01/26/2018    Heroin abuse 01/26/2018    Type 1 diabetes mellitus with hyperglycemia ([facility] ) 02/10/2018     Resolved Ambulatory Problems     Diagnosis Date Noted    Hypertension     GERD (gastroesophageal reflux disease)     Hyperglycemia 01/26/2018    Sepsis due to cellulitis ([facility] ) 01/27/2018    Influenza A 01/28/2018    Cellulitis and abscess of right lower extremity 02/10/2018     Past Medical History:   Diagnosis Date    Cocaine abuse 3/17/2018    Diabetes mellitus (HCC)     GERD (gastroesophageal reflux disease)     Hepatitis C     Hypertension     Leukocytosis (leucocytosis) 3/17/2018    MRSA (methicillin resistant Staphylococcus aureus) 3/17/2018    Seizures ([facility] ) Admitting Diagnosis: Diabetes ([place] [address] ) [E11 9]  DKA (diabetic ketoacidoses) ([place] [address] ) [E13 10]  Vomiting [R11 10]  Hepatitis C [B19 20]  IVDU (intravenous drug user) [F19 90]  Type 1 diabetes mellitus with hyperglycemia ([place] [address] ) [E10 65]  H/O noncompliance with medical treatment, presenting hazards to health [Z91 19]  Heroin abuse [F11 10]    Age/Sex: 27 y o  female    1  Assessment/Plan:    Diabetic ketoacidosis with type 1 diabetes mellitus and history  · Admit to step-down, patient will require greater than a 2 midnight stay  · Initiate DKA protocol  · Monitor blood sugars q 1 hour  · Continue insulin and IV fluids as per DKA protocol  · Monitor electrolytes and replete as needed  2  Anion gap Metabolic acidosis secondary to 1   · Continue IV fluids as above  · Monitor metabolic status closely  · Will hold on bicarbonate drip for now  3  Active IV heroin abuse and crack cocaine abuse  · Monitor for withdrawal  · In light of leukocytosis will check blood cultures  Most recent echocardiogram on 02/14/2018 did not reveal valvular heart disease or any valvular vegetations  Will discuss with critical care attending need for repeat echocardiogram   · Obtain blood cultures  Monitor for signs of infection  Will check chest x-ray  4  Leukocytosis with a history of right thigh MRSA cellulitis  · This is most likely reactive secondary to vomiting continue to monitor for signs of infection  · Blood cultures x2 are pending  · MRSA culture pending  · Will need contact precautions  5  History hypertension  · Currently normotensive  · Patient reports not using hypertensive medications for greater than 2 weeks  · Continue to monitor closely  6   History hepatitis-C  Monitor LFTs    Admission Orders:   IP    3/17  @    0844  Scheduled Meds:   Current Facility-Administered Medications:  acetaminophen 650 mg Oral Q6H PRN Ambreen Means, CRNP   gabapentin 100 mg Oral TID Leslie Nate Bilofsky, CRNP   heparin (porcine) 5,000 Units Subcutaneous [facility] Bk Mckayday, CRNP   insulin glargine 20 Units Subcutaneous HS Charles Sonday, CRNP   insulin lispro 1-5 Units Subcutaneous TID [facility], CRNP   insulin lispro 1-5 Units Subcutaneous HS Charles Sonday, CRNP   insulin lispro 1-5 Units Subcutaneous [address] Charles Sonday, CRNP   insulin lispro 7 Units Subcutaneous Daily With Breakfast Bk Sonday, CRNP   insulin lispro 7 Units Subcutaneous Daily With [facility] SYS, CRNP   insulin lispro 7 Units Subcutaneous Daily With [facility] Sonday, CRNP   LORazepam 0 5 mg Intravenous Q6H PRN Annita Salmeron, CRNP   morphine injection 1 mg Intravenous Q4H PRN PREET Orta   ondansetron 4 mg Intravenous Q6H PRN Mary Howell, REDNP   pantoprazole 40 mg Oral Daily Before Breakfast Lynwood Severin, MD   venlafaxine 37 5 mg Oral Daily Charles Sonday, PREET     Continuous Infusions:    PRN Meds:   acetaminophen    LORazepam    morphine injection    ondansetron     Tele  Cons endocrine  CCD diet  Insulin drip - now  D/c

## 2018-03-19 NOTE — PHYSICIAN ADVISOR
Current patient class: Inpatient  The patient is currently on Hospital Day: 3      The patient was admitted to the hospital  on 3/17/18 at [id number] for the following diagnosis:  Diabetes ([facility] ) [E11 9]  DKA (diabetic ketoacidoses) ([facility] ) [E13 10]  Vomiting [R11 10]  Hepatitis C [B19 20]  IVDU (intravenous drug user) [F19 90]  Type 1 diabetes mellitus with hyperglycemia ([facility] ) [E10 65]  H/O noncompliance with medical treatment, presenting hazards to health [Z91 19]  Heroin abuse [F11 10]       There is documentation in the medical record of an expected length of stay of at least 2 midnights  The patient is therefore expected to satisfy the 2 midnight benchmark and given the 2 midnight presumption is appropriate for INPATIENT ADMISSION  Given this expectation of a satisfying stay, CMS instructs us that the patient is most often appropriate for inpatient admission under part A provided medical necessity is documented in the chart  After review of the relevant documentation, labs, vital signs and test results, the patient is appropriate for INPATIENT ADMISSION  Admission to the hospital as an inpatient is a complex decision making process which requires the practitioner to consider the patients presenting complaint, history and physical examination and all relevant testing  With this in mind, in this case, the patient was deemed appropriate for INPATIENT ADMISSION  After review of the documentation and testing available at the time of the admission I concur with this clinical determination of medical necessity  The patient does have an inpatient admission within the previous 30 days  The patient was admitted on 2/9/18 and discharged on 2/16/18 as an inpatient  The patient therefore required readmission review  In this case the patient should be considered a SEPARATE and UNRELATED INPATIENT ADMISSION  The patient had been discharged in stable condition with a completed care plan   There were no unresolved acute medical issues at the time of discharged which would have reasonably been expected to prompt this readmission  Rationale is as follows:    28 yo female IDDM (hyperglycemia with poor compliance due to homelessness and drug addiction) who originally presented to the hospital on 2/9/2018 due to Sepsis secondary to cellulitis and absess of right lower extremity, likely related to known heroin abuse and poor self-care due to MRSA  Surgery was consulted and she had I&D of abscess  ID consulted and continued IV rollins until she was switched to clindamycin to complete course as outpatient  Upon discharge on 2/16/18 was instructed to resume her previous dose of lantus      Pt did return to ER 2 days after discharge (2/18/18) for reevaluation of wound when packing fell out   As it appeared well healing, pt was given an IV dose of clindamycin and dose of toradol and discharged home for f/u with wound clinic as scheduled the following day  3/17/18 pt arrived to ER with vomiting and was found to be in DKA   She admitted once again that she had not used her insulin for past week  Currently in ICU and was on insulin gtt with improvement in her AG metabolic acidosis yesterday, thus switched to lantus and sliding scale coverage, however this am back in DKA and switched back to IVF and insulin gtt  Pt readmission with 30 days related to her noncompliance and multiple socioeconomic issues and thus UNRELATED to previous admit for sepsis secondary to abscess      The patients vitals on arrival were ED Triage Vitals [03/17/18 0443]   Temperature Pulse Respirations Blood Pressure SpO2   98 7 °F (37 1 °C) (!) 114 16 118/57 100 %      Temp Source Heart Rate Source Patient Position - Orthostatic VS BP Location FiO2 (%)   Oral Monitor Lying Right arm --      Pain Score       4           Past Medical History:   Diagnosis Date    Cocaine abuse 3/17/2018    Diabetes mellitus (HCC)     GERD (gastroesophageal reflux disease)     Hepatitis C     Hypertension     Leukocytosis (leucocytosis) 3/17/2018    MRSA (methicillin resistant Staphylococcus aureus) 3/17/2018    Seizures ([address] )     last seizure Nov 2017     Past Surgical History:   Procedure Laterality Date    BACK SURGERY      lumbar     EYE SURGERY      bilateral    INCISION AND DRAINAGE ABSCESS / HEMATOMA OF BURSA / KNEE / THIGH Right            Consults have been placed to:   IP CONSULT TO ENDOCRINOLOGY  IP CONSULT TO ALCOHOL BRIEF INTERVENTION TRAUMA    Vitals:    03/19/18 0330 03/19/18 0725 03/19/18 0738 03/19/18 1120   BP: 109/61 114/73     Pulse: 86 88     Resp: 17 (!) 30     Temp:   98 3 °F (36 8 °C) 98 4 °F (36 9 °C)   TempSrc:   Temporal Temporal   SpO2: 98% 99%     Weight:       Height:           Most recent labs:    Recent Labs      03/17/18   0621   03/18/18   0948   03/19/18   0540   WBC  15 82*   < >  16 75*   --    --    HGB  10 5*   < >  9 9*   --    --    HCT  34 8   < >  33 3*   --    --    PLT  334   < >  367   --    --    K  5 3   < >  4 0   < >  3 2*   NA  137   < >  145   < >  141   CALCIUM  9 2   < >  8 2*   < >  7 9*   BUN  23   < >  11   < >  5   CREATININE  1 07   < >  0 96   < >  0 54*   LIPASE  139   --    --    --    --    TROPONINI  <0 02   --    --    --    --    AST  39   --    --    --    --    ALT  16   --    --    --    --    ALKPHOS  124*   --    --    --    --    BILITOT  0 73   --    --    --    --     < > = values in this interval not displayed         Scheduled Meds:  Current Facility-Administered Medications:  acetaminophen 650 mg Oral Q6H PRN PREET Gloria   gabapentin 100 mg Oral TID PREET Torres   heparin (porcine) 5,000 Units Subcutaneous [facility] [address] Charles Sonday, PREET   insulin glargine 20 Units Subcutaneous HS Charles Sonday, PREET   insulin lispro 1-5 Units Subcutaneous TID [facility], CRNP   insulin lispro 1-5 Units Subcutaneous HS Charles Sonday, REDNP   insulin lispro 1-5 Units Subcutaneous 0200 Charles Sonday, CRNP   insulin lispro 7 Units Subcutaneous Daily With Breakfast Malaika Ackerman, CRNP   insulin lispro 7 Units Subcutaneous Daily With [facility] SYS, CRNP   insulin lispro 7 Units Subcutaneous Daily With [facility] Pablito, PREET   LORazepam 0 5 mg Intravenous Q6H PRN Malaika Ackerman, CRNP   morphine injection 1 mg Intravenous Q4H PRN Meryle Jacquet Bilofsky, PREET   ondansetron 4 mg Intravenous Q6H PRN Meryle Jacquet Bilofsky, PREET   pantoprazole 40 mg Oral Daily Before Breakfast Zahraa Maria MD   venlafaxine 37 5 mg Oral Daily Bk Kenney, PREET     Continuous Infusions:   PRN Meds:   acetaminophen    LORazepam    morphine injection    ondansetron    Surgical procedures (if appropriate):

## 2018-03-19 NOTE — PROGRESS NOTES
Progress Note - Critical Care   Mayuri Guerrero 27 y o  female MRN: [id number]  Unit/Bed#: ICU 08 Encounter: [id number]    Assessment/Plan:  1  DKA with type 1 DM  · AG now closed with normal bicarb  · Insulin gtt, IVF discontinued  · Lantus 20 u given at HS last night  · Will increase to normal dosage lantus 40 u tonight  · Continue SSI and meal coverage  · Will advance diet as tolerated to carb controlled  · Awaiting endocrinology consult    2  Heroin and crack cocaine abuse with w/d symptoms, last used 3/16  · No symptoms overnight  · Continue morphine prn    3  Hypokalemia  · Will replete    4  Leukocytosis with history of MRSA cellulitis of right thigh  · Will follow WBC, temps  · Follow cultures    5  Hypertension, history  · Normotensive at present  _____________________________________________________________________    HPI/24hr events:   Patient blood sugar dropped to 44 while still on insulin infusion, following discontinuation of IVF  Patient had no symptoms and was able to take orange juice       Medications:    Current Facility-Administered Medications:  dextrose       acetaminophen 650 mg Oral Q6H PRN Sita Market, CRNP   gabapentin 100 mg Oral TID PREET Martin   heparin (porcine) 5,000 Units Subcutaneous [facility] [address] Charles Sonday, CRNP   insulin glargine 20 Units Subcutaneous HS Charles Sonday, CRNP   insulin lispro 1-5 Units Subcutaneous TID [facility], CRNP   insulin lispro 1-5 Units Subcutaneous HS Charles Sonday, CRNP   insulin lispro 1-5 Units Subcutaneous 0200 Charles Sonday, CRNP   insulin lispro 7 Units Subcutaneous Daily With Breakfast Charles Sonday, CRNP   insulin lispro 7 Units Subcutaneous Daily With [facility], CRNP   insulin lispro 7 Units Subcutaneous Daily With [facility] Sonday, CRNP   LORazepam 0 5 mg Intravenous Q6H PRN Sita Market, CRNP   morphine injection 1 mg Intravenous Q4H PRN Rigoberto Howell, PREET   ondansetron 4 mg Intravenous Q6H PRN Rigoberto Tamez PREET Howell   pantoprazole 40 mg Oral Daily Before Breakfast Brook Arias MD   potassium chloride 40 mEq Oral Once PREET Pinto   venlafaxine 37 5 mg Oral Daily PREET Feliciano              Physical exam:  Vitals: Body mass index is 23 81 kg/m²  Blood pressure 109/61, pulse 86, temperature 98 °F (36 7 °C), temperature source Temporal, resp  rate 17, height 5' 5" (1 651 m), weight 64 9 kg (143 lb 1 3 oz), last menstrual period 03/15/2018, SpO2 98 % ,  Temp  Min: 98 °F (36 7 °C)  Max: 99 2 °F (37 3 °C)  IBW: 57 kg    SpO2: 98 %  SpO2 Activity: At Rest  O2 Device: None (Room air)      Intake/Output Summary (Last 24 hours) at 03/19/18 0715  Last data filed at 03/19/18 0501   Gross per 24 hour   Intake          4719 54 ml   Output             2200 ml   Net          2519 54 ml       Invasive/non-invasive ventilation settings:   Respiratory    Lab Data (Last 4 hours)    None         O2/Vent Data (Last 4 hours)    None              Invasive Devices     Peripheral Intravenous Line            Peripheral IV 03/18/18 Right External Jugular less than 1 day                  Physical Exam:  Gen: in NAD  HEENT: normocephalic, atraumatic, oropharynx clear  Neck: supple, no JVD, no lymphadenopathy  Chest: lung sounds clear, equal  Cor: audible S1,S2, no M/G/R  Abd: soft, non tender, non distended  Ext: moves all extremities equally, track marks over all extremities  Neuro: alert and oriented x3, pupils irregularly shaped, reactive  Skin: warm, dry      Diagnostic Data:  Lab: I have personally reviewed pertinent lab results     CBC:     Results from last 7 days  Lab Units 03/18/18  0948 03/17/18  1210 03/17/18  0621   WBC Thousand/uL 16 75* 16 82* 15 82*   HEMOGLOBIN g/dL 9 9* 10 9* 10 5*   HEMATOCRIT % 33 3* 35 7 34 8   PLATELETS Thousands/uL 367 380 334       CMP:     Results from last 7 days  Lab Units 03/19/18  0540 03/18/18  2336 03/18/18  1820  03/17/18  0621   SODIUM mmol/L 141 140 142  < > 137   POTASSIUM mmol/L 3 2* 3 6 3 4*  < > 5 3   CHLORIDE mmol/L 109* 110* 113*  < > 99*   CO2 mmol/L 23 21 19*  < > 13*   BUN mg/dL 5 7 7  < > 23   CREATININE mg/dL 0 54* 0 73 0 71  < > 1 07   CALCIUM mg/dL 7 9* 8 1* 8 0*  < > 9 2   TOTAL PROTEIN g/dL  --   --   --   --  8 2   BILIRUBIN TOTAL mg/dL  --   --   --   --  0 73   ALK PHOS U/L  --   --   --   --  124*   ALT U/L  --   --   --   --  16   AST U/L  --   --   --   --  39   GLUCOSE RANDOM mg/dL 86 270* 104  < > 478*   < > = values in this interval not displayed  PT/INR:   No results found for: PT, INR,   Magnesium:     Results from last 7 days  Lab Units 03/18/18  2336 03/18/18  1820 03/18/18  1433   MAGNESIUM mg/dL 2 1 2 2 2 3     Phosphorous:     Results from last 7 days  Lab Units 03/18/18  2336 03/18/18  1820 03/18/18  1433   PHOSPHORUS mg/dL 2 0* 1 7* 2 0*       Microbiology:    Results from last 7 days  Lab Units 03/17/18  1555 03/17/18  1221   BLOOD CULTURE  No Growth at 24 hrs  No Growth at 24 hrs  Cardiac lab/EKG/telemetry/ECHO:   NSR    VTE Prophylaxis: heparin/SCDs    Code Status: Level 1 - Full Code    PREET Cummins    Portions of the record may have been created with voice recognition software  Occasional wrong word or "sound a like" substitutions may have occurred due to the inherent limitations of voice recognition software  Read the chart carefully and recognize, using context, where substitutions have occurred

## 2018-03-19 NOTE — CONSULTS
Consultation - Juan Ely 27 y o  female MRN: [id number]    Unit/Bed#: ICU 08 Encounter: [id number]      Assessment/Plan     Assessment: This is a 27y o -year-old female with diabetes with hyperglycemia, hypoglycemia, diabetic neuropathy and DKA  Plan:  1  Type 1 diabetes with hypo and hyperglycemia-continue mealtime and correctional insulin  Decrease Lantus to 35 units due to hypoglycemia in the morning  Based on hemoglobin A1c her diabetes is under poor control  Check urine for microalbumin  2   Diabetic peripheral neuropathy is stable  3   DKA has resolved  CC: Diabetes Consult    History of Present Illness     HPI: Juan Ely is a 27y o  year old female with type 1 diabetes for 12 years  She is on insulin at home  She denies any polyuria, polydipsia, nocturia and blurry vision  She denies nephropathy, retinopathy, heart attack, stroke and claudication but does admit to neuropathy  She had an episode of hypoglycemia this morning  She presented to the hospital with DKA  She tells me that she ran out of her insulin about a week ago in all of her insulin is in [place] where she normally lives  Inpatient consult to Endocrinology  Consult performed by: Shawn Patel  Consult ordered by: Ruiz Mireles          Review of Systems   Constitutional: Negative for chills and fever  Respiratory: Negative for shortness of breath  Cardiovascular: Negative for chest pain  Gastrointestinal: Negative for constipation, diarrhea, nausea and vomiting  Endocrine: Negative for polyuria  Genitourinary: Negative for difficulty urinating  Neurological: Negative for dizziness  All other systems reviewed and are negative        Historical Information   Past Medical History:   Diagnosis Date    Cocaine abuse 3/17/2018    Diabetes mellitus (HCC)     GERD (gastroesophageal reflux disease)     Hepatitis C     Hypertension     Leukocytosis (leucocytosis) 3/17/2018    MRSA (methicillin resistant Staphylococcus aureus) 3/17/2018    Seizures ([address] )     last seizure Nov 2017     Past Surgical History:   Procedure Laterality Date    BACK SURGERY      lumbar     EYE SURGERY      bilateral    INCISION AND DRAINAGE ABSCESS / HEMATOMA OF BURSA / KNEE / THIGH Right      Social History   History   Alcohol Use No     History   Drug Use    Types: Heroin, "Crack" cocaine     Comment: IV, yesterday     History   Smoking Status    Former Smoker    Packs/day: 0 50    Years: 2 00    Quit date: 3/17/2016   Smokeless Tobacco    Never Used     Family History: History reviewed  No pertinent family history      Meds/Allergies   Current Facility-Administered Medications   Medication Dose Route Frequency Provider Last Rate Last Dose    acetaminophen (TYLENOL) tablet 650 mg  650 mg Oral Q6H PRN [place] Hodge   650 mg at 03/17/18 1749    gabapentin (NEURONTIN) capsule 100 mg  100 mg Oral TID PREET Salazar   100 mg at 03/19/18 0800    heparin (porcine) subcutaneous injection 5,000 Units  5,000 Units Subcutaneous [facility], PREET   5,000 Units at 03/19/18 1419    insulin glargine (LANTUS) subcutaneous injection 40 Units  40 Units Subcutaneous HS PREET Martínez        insulin lispro (HumaLOG) 100 units/mL subcutaneous injection 1-5 Units  1-5 Units Subcutaneous TID [facility], PREET   4 Units at 03/19/18 1140    insulin lispro (HumaLOG) 100 units/mL subcutaneous injection 1-5 Units  1-5 Units Subcutaneous HS PREET Lizama        insulin lispro (HumaLOG) 100 units/mL subcutaneous injection 1-5 Units  1-5 Units Subcutaneous 0200 PREET Lizama   1 Units at 03/19/18 0207    insulin lispro (HumaLOG) 100 units/mL subcutaneous injection 7 Units  7 Units Subcutaneous Daily With Breakfast PREET Lizama        insulin lispro (HumaLOG) 100 units/mL subcutaneous injection 7 Units  7 Units Subcutaneous [facility]   7 Units at 03/19/18 1140    insulin lispro (HumaLOG) 100 units/mL subcutaneous injection 7 Units  7 Units Subcutaneous Daily With Jabil Brittaney PREET Kenney        LORazepam (ATIVAN) 2 mg/mL injection 0 5 mg  0 5 mg Intravenous Q6H PRN Josué PREET Miguel   0 5 mg at 03/19/18 1419    morphine injection 1 mg  1 mg Intravenous Q4H PRN Raf Given LyndaPREET   1 mg at 03/19/18 1351    ondansetron (ZOFRAN) injection 4 mg  4 mg Intravenous Q6H PRN PREET Marsh   4 mg at 03/18/18 0923    pantoprazole (PROTONIX) EC tablet 40 mg  40 mg Oral Daily Before Breakfast Daria Greenwood MD   40 mg at 03/19/18 0619    venlafaxine (EFFEXOR-XR) 24 hr capsule 37 5 mg  37 5 mg Oral Daily Bk KenneyPREET   37 5 mg at 03/19/18 0800     Allergies   Allergen Reactions    Tramadol Seizures    Amoxicillin Rash    Vicodin [Hydrocodone-Acetaminophen] Abdominal Pain       Objective   Vitals: Blood pressure 118/81, pulse 100, temperature 98 4 °F (36 9 °C), temperature source Temporal, resp  rate 17, height 5' 5" (1 651 m), weight 64 9 kg (143 lb 1 3 oz), last menstrual period 03/15/2018, SpO2 99 %  Intake/Output Summary (Last 24 hours) at 03/19/18 1525  Last data filed at 03/19/18 1346   Gross per 24 hour   Intake           879 27 ml   Output             2875 ml   Net         -1995 73 ml     Invasive Devices     Peripheral Intravenous Line            Peripheral IV 03/18/18 Right External Jugular 1 day                Physical Exam   Constitutional: She is oriented to person, place, and time  She appears well-developed and well-nourished  No distress  HENT:   Head: Normocephalic and atraumatic  Mouth/Throat: Oropharynx is clear and moist and mucous membranes are normal  No oropharyngeal exudate  edentulous   Eyes: Conjunctivae, EOM and lids are normal  Right eye exhibits no discharge  Left eye exhibits no discharge  No scleral icterus  Neck: Neck supple  No thyromegaly present     Cardiovascular: Normal rate, regular rhythm and normal heart sounds  Exam reveals no gallop and no friction rub  No murmur heard  Pulmonary/Chest: Effort normal and breath sounds normal  No respiratory distress  She has no wheezes  Abdominal: Soft  Bowel sounds are normal  She exhibits no distension  There is no tenderness  Musculoskeletal: Normal range of motion  She exhibits no edema, tenderness or deformity  Lymphadenopathy:        Head (right side): No occipital adenopathy present  Head (left side): No occipital adenopathy present  Right: No supraclavicular adenopathy present  Left: No supraclavicular adenopathy present  Neurological: She is alert and oriented to person, place, and time  No cranial nerve deficit  Skin: Skin is warm and intact  No rash noted  She is not diaphoretic  No erythema  Psychiatric: She has a normal mood and affect  Vitals reviewed  The history was obtained from the review of the chart, patient  Lab Results:       Lab Results   Component Value Date    WBC 16 75 (H) 03/18/2018    HGB 9 9 (L) 03/18/2018    HCT 33 3 (L) 03/18/2018    MCV 89 03/18/2018     03/18/2018     Lab Results   Component Value Date/Time    BUN 5 03/19/2018 05:40 AM     03/19/2018 05:40 AM    K 3 2 (L) 03/19/2018 05:40 AM     (H) 03/19/2018 05:40 AM    CO2 23 03/19/2018 05:40 AM    CREATININE 0 54 (L) 03/19/2018 05:40 AM    AST 39 03/17/2018 06:21 AM    ALT 16 03/17/2018 06:21 AM    ALB 3 9 03/17/2018 06:21 AM     No results for input(s): CHOL, HDL, LDL, TRIG, VLDL in the last 72 hours  No results found for: Suzette Parry  POC Glucose (mg/dl)   Date Value   03/19/2018 358 (H)   03/19/2018 127   03/19/2018 61 (L)   03/19/2018 180 (H)   03/18/2018 255 (H)   03/18/2018 167 (H)   03/18/2018 57 (L)   03/18/2018 46 (L)   03/18/2018 41 (L)   03/18/2018 137    Hemoglobin A1c done on February 10, 2018 was 9 2%  Imaging Studies: I have personally reviewed pertinent reports        Portions of the record may have been created with voice recognition software

## 2018-03-20 VITALS
RESPIRATION RATE: 18 BRPM | TEMPERATURE: 97 F | WEIGHT: 143.08 LBS | HEART RATE: 91 BPM | HEIGHT: 65 IN | OXYGEN SATURATION: 100 % | SYSTOLIC BLOOD PRESSURE: 125 MMHG | BODY MASS INDEX: 23.84 KG/M2 | DIASTOLIC BLOOD PRESSURE: 83 MMHG

## 2018-03-20 PROBLEM — Z59.00 HOMELESSNESS: Status: ACTIVE | Noted: 2018-03-20

## 2018-03-20 PROBLEM — D72.829 LEUKOCYTOSIS (LEUCOCYTOSIS): Status: RESOLVED | Noted: 2018-03-17 | Resolved: 2018-03-20

## 2018-03-20 PROBLEM — E10.10 DIABETIC KETOACIDOSIS ASSOCIATED WITH TYPE 1 DIABETES MELLITUS (HCC): Status: RESOLVED | Noted: 2018-01-26 | Resolved: 2018-03-20

## 2018-03-20 LAB
ANION GAP SERPL CALCULATED.3IONS-SCNC: 9 MMOL/L (ref 4–13)
BUN SERPL-MCNC: 12 MG/DL (ref 5–25)
CALCIUM SERPL-MCNC: 8.7 MG/DL (ref 8.3–10.1)
CHLORIDE SERPL-SCNC: 101 MMOL/L (ref 100–108)
CO2 SERPL-SCNC: 24 MMOL/L (ref 21–32)
CREAT SERPL-MCNC: 0.88 MG/DL (ref 0.6–1.3)
ERYTHROCYTE [DISTWIDTH] IN BLOOD BY AUTOMATED COUNT: 17.2 % (ref 11.6–15.1)
GFR SERPL CREATININE-BSD FRML MDRD: 86 ML/MIN/1.73SQ M
GLUCOSE SERPL-MCNC: 186 MG/DL (ref 65–140)
GLUCOSE SERPL-MCNC: 253 MG/DL (ref 65–140)
GLUCOSE SERPL-MCNC: 264 MG/DL (ref 65–140)
HCT VFR BLD AUTO: 35.9 % (ref 34.8–46.1)
HGB BLD-MCNC: 10.9 G/DL (ref 11.5–15.4)
MCH RBC QN AUTO: 26 PG (ref 26.8–34.3)
MCHC RBC AUTO-ENTMCNC: 30.4 G/DL (ref 31.4–37.4)
MCV RBC AUTO: 86 FL (ref 82–98)
PLATELET # BLD AUTO: 272 THOUSANDS/UL (ref 149–390)
PMV BLD AUTO: 10.1 FL (ref 8.9–12.7)
POTASSIUM SERPL-SCNC: 4.3 MMOL/L (ref 3.5–5.3)
RBC # BLD AUTO: 4.2 MILLION/UL (ref 3.81–5.12)
SODIUM SERPL-SCNC: 134 MMOL/L (ref 136–145)
WBC # BLD AUTO: 6.44 THOUSAND/UL (ref 4.31–10.16)

## 2018-03-20 PROCEDURE — 99232 SBSQ HOSP IP/OBS MODERATE 35: CPT | Performed by: INTERNAL MEDICINE

## 2018-03-20 PROCEDURE — 82948 REAGENT STRIP/BLOOD GLUCOSE: CPT

## 2018-03-20 PROCEDURE — 85027 COMPLETE CBC AUTOMATED: CPT | Performed by: NURSE PRACTITIONER

## 2018-03-20 PROCEDURE — 80048 BASIC METABOLIC PNL TOTAL CA: CPT | Performed by: NURSE PRACTITIONER

## 2018-03-20 PROCEDURE — 99239 HOSP IP/OBS DSCHRG MGMT >30: CPT | Performed by: INTERNAL MEDICINE

## 2018-03-20 RX ORDER — INSULIN GLARGINE 100 [IU]/ML
40 INJECTION, SOLUTION SUBCUTANEOUS
Qty: 10 ML | Refills: 0
Start: 2018-03-20

## 2018-03-20 RX ORDER — INSULIN GLARGINE 100 [IU]/ML
40 INJECTION, SOLUTION SUBCUTANEOUS
Status: DISCONTINUED | OUTPATIENT
Start: 2018-03-20 | End: 2018-03-20 | Stop reason: HOSPADM

## 2018-03-20 RX ADMIN — MORPHINE SULFATE 1 MG: 2 INJECTION, SOLUTION INTRAMUSCULAR; INTRAVENOUS at 13:50

## 2018-03-20 RX ADMIN — INSULIN LISPRO 6 UNITS: 100 INJECTION, SOLUTION INTRAVENOUS; SUBCUTANEOUS at 08:44

## 2018-03-20 RX ADMIN — ACETAMINOPHEN 650 MG: 325 TABLET, FILM COATED ORAL at 07:42

## 2018-03-20 RX ADMIN — LORAZEPAM 0.5 MG: 2 INJECTION INTRAMUSCULAR; INTRAVENOUS at 13:50

## 2018-03-20 RX ADMIN — INSULIN LISPRO 2 UNITS: 100 INJECTION, SOLUTION INTRAVENOUS; SUBCUTANEOUS at 11:43

## 2018-03-20 RX ADMIN — GABAPENTIN 100 MG: 100 CAPSULE ORAL at 08:43

## 2018-03-20 RX ADMIN — HEPARIN SODIUM 5000 UNITS: 5000 INJECTION, SOLUTION INTRAVENOUS; SUBCUTANEOUS at 13:50

## 2018-03-20 RX ADMIN — VENLAFAXINE HYDROCHLORIDE 37.5 MG: 37.5 CAPSULE, EXTENDED RELEASE ORAL at 10:07

## 2018-03-20 RX ADMIN — PANTOPRAZOLE SODIUM 40 MG: 40 TABLET, DELAYED RELEASE ORAL at 06:06

## 2018-03-20 RX ADMIN — INSULIN LISPRO 7 UNITS: 100 INJECTION, SOLUTION INTRAVENOUS; SUBCUTANEOUS at 08:44

## 2018-03-20 RX ADMIN — LORAZEPAM 0.5 MG: 2 INJECTION INTRAMUSCULAR; INTRAVENOUS at 07:35

## 2018-03-20 RX ADMIN — INSULIN LISPRO 7 UNITS: 100 INJECTION, SOLUTION INTRAVENOUS; SUBCUTANEOUS at 11:43

## 2018-03-20 RX ADMIN — MORPHINE SULFATE 1 MG: 2 INJECTION, SOLUTION INTRAMUSCULAR; INTRAVENOUS at 05:15

## 2018-03-20 RX ADMIN — HEPARIN SODIUM 5000 UNITS: 5000 INJECTION, SOLUTION INTRAVENOUS; SUBCUTANEOUS at 05:15

## 2018-03-20 RX ADMIN — MORPHINE SULFATE 1 MG: 2 INJECTION, SOLUTION INTRAMUSCULAR; INTRAVENOUS at 09:32

## 2018-03-20 NOTE — PLAN OF CARE
GASTROINTESTINAL - ADULT     Minimal or absence of nausea and/or vomiting Progressing     Maintains adequate nutritional intake Progressing        Knowledge Deficit     Patient/family/caregiver demonstrates understanding of disease process, treatment plan, medications, and discharge instructions Progressing        METABOLIC, FLUID AND ELECTROLYTES - ADULT     Electrolytes maintained within normal limits Progressing     Fluid balance maintained Progressing     Glucose maintained within target range Progressing        PAIN - ADULT     Verbalizes/displays adequate comfort level or baseline comfort level Progressing        Potential for Falls     Patient will remain free of falls Progressing        SAFETY ADULT     Maintain or return to baseline ADL function Progressing     Maintain or return mobility status to optimal level Progressing

## 2018-03-20 NOTE — DISCHARGE INSTRUCTIONS
Take your medications as prescribed  Maintain proper hydration   Follow a diabetic diet  Follow up with your family doctor when your return to [place]

## 2018-03-20 NOTE — SOCIAL WORK
CM received Humalog & Lantus scripts from attending  Copied scripts and put originals in chart  Faxed scripts down to [facility] to determine costs  Pharmacy called back and reported the co-pay for both scripts is $3 70  Humalog has to be injected 3X a day; Lantus 1X a day  Script for test strips and syringes were also received and faxed to [facility]  Patient now has Medicare, which she didn't have at her last admission  Meanwhile, patient had given RN number for CM to call a "Rosie Lomas" () from a program that provides services to the homeless  Payment for meds and transport had yet to be determined  CM called Rosie Lomas and discussed the matter  She requested the scripts be faxed over to [address] [place] [address] (fax#[phone]) as they are connected with a "[employer]" program that assist in paying for medications for homeless individuals  However, CM informed Rosie Lomas that the department would not be paying for a taxi as patient has had them provided to her on her other IP admissions, this being her 3rd IP stay since the end of January, in addition to 4 ER visits, a total of 7 hospital interventions between [address] [place] [address]  Rosie Lomas requested that CM reconsider, but it was explained to her that they are not given lightly, and she has already been provided two of them in a very short amount of time  The warming station, where patient is staying, is at [address] then met with patient at bedside  CM provided to her a flyer of all the drug/alcohol program in the local area  CM had noticed that a referral for [facility]  had been sent at her last [facility] admission  When CM inquired as to the progress with that, patient indicated she had no idea to what was being referred to  CM gave her the scripts to take with her as was explained that they had been faxed over to the [facility] pharmacy already, but that she might need them when she gets there to  medications       CM attempted to open, but once it was explained that the taxi voucher would not be provided due to all the free services she has already received from [facility] since the end of January, patient became indignant, using foul language and threatening to pull her PICC line out of her neck if the nurse wasn't there in 5 minutes to take it out  CM left without opening

## 2018-03-20 NOTE — NURSING NOTE
Patient verbalized understanding of discharge instructions  Prescriptions [facility]ed to [facility] to take care of  Patient's IV removed, VSS  No further concerns or questions  Patient discharged with s/o by side

## 2018-03-20 NOTE — PROGRESS NOTES
Progress Note - Zahra Tabares 27 y o  female MRN: [id number]    Unit/Bed#: [address] 225-01 Encounter: [id number]      CC: diabetes f/u    Subjective:   Zahra Tabares is a 27y o  year old female with type 1 diabetes  Feels well  No complaints  No hypoglycemia  Objective:     Vitals: Blood pressure 125/83, pulse 91, temperature (!) 97 °F (36 1 °C), temperature source Tympanic, resp  rate 18, height 5' 5" (1 651 m), weight 64 9 kg (143 lb 1 3 oz), last menstrual period 03/15/2018, SpO2 100 %  ,Body mass index is 23 81 kg/m²  Intake/Output Summary (Last 24 hours) at 03/20/18 1215  Last data filed at 03/20/18 1036   Gross per 24 hour   Intake                0 ml   Output             2950 ml   Net            -2950 ml       Physical Exam:  General Appearance: awake, appears stated age and cooperative  Head: Normocephalic, without obvious abnormality, atraumatic  Extremities: moves all extremities  Skin: Skin color and temperature normal    Pulm: no labored breathing    Lab, Imaging and other studies: I have personally reviewed pertinent reports  POC Glucose (mg/dl)   Date Value   03/20/2018 186 (H)   03/20/2018 264 (H)   03/19/2018 308 (H)   03/19/2018 454 (H)   03/19/2018 358 (H)   03/19/2018 127   03/19/2018 61 (L)   03/19/2018 180 (H)   03/18/2018 255 (H)   03/18/2018 167 (H)       Assessment:  diabetes with hyperglycemia and diabetic neuropathy    Plan:  1  Type 1 diabetes with hyperglycemia - increase lantus to 40 and humalog to 12  Monitor BS over time  Ok to D/C from endocrine standpoint  2   Diabetic neuropathy is stable  Portions of the record may have been created with voice recognition software

## 2018-03-20 NOTE — DISCHARGE SUMMARY
Discharge Summary - [address] [facility]    Patient Information: Padilla Wagner 27 y o  female MRN: [id number]  Unit/Bed#: [address] [facility] 225-01 Encounter: [id number]    Discharging Physician / Practitioner: Maira Owen MD  PCP: No primary care provider on file  Admission Date: 3/17/2018  Discharge Date: 03/20/18    Reason for Admission:  Nausea and vomiting    Discharge Diagnoses:     Principal Problem (Resolved):    Diabetic ketoacidosis associated with type 1 diabetes mellitus ([address] )  Active Problems:    Type 1 diabetes mellitus with hyperglycemia (HCC)    Homelessness  Resolved Problems:    Hypertension    Leukocytosis (leucocytosis)      Consultations During Hospital Stay:  · Endocrine    Procedures Performed:     · None    Significant Findings:     · 03/17/2018:  Glucose 478, bicarb 13, anion gap 25    Incidental Findings:   · None     Test Results Pending at Discharge (will require follow up): · None     Outpatient Tests Requested:  · None    Complications:  None    Hospital Course:     Padilla Wagner is a 27 y o  female patient who originally presented to the hospital on 3/17/2018 due to nausea and vomiting  She has known history of type 1 diabetes, as well as active heroin and cocaine use, hepatitis-C, homelessness, GERD  She apparently ran out of her insulin for 1 week prior to admission  She presented to the ER and was found to be in diabetic ketoacidosis  She was admitted by the critical care service and was started on aggressive fluid hydration and intravenous insulin  She was evaluated by endocrine and was transitioned to subcutaneous insulin once her acidosis resolved and anion closed  She was placed on Lantus 40 units at night and Humalog 12 units with meals  Due to her homelessness, case management had to be involved with regards to obtaining medication  A prescription was given for her insulin as well as glucose test strips and insulin syringes    This was forwarded to [facility] pharmacy and the medication will be paid for by the indigent program   The patient plans to go back to [place] later this week  She was advised to follow up with her PCP when she gets back there    Condition at Discharge: good     Discharge Day Visit / Exam:     Subjective:  No events overnight  No nausea or vomiting  Tolerated diet  Vitals: Blood Pressure: 125/83 (03/19/18 2316)  Pulse: 91 (03/19/18 2316)  Temperature: (!) 97 °F (36 1 °C) (03/19/18 2316)  Temp Source: Tympanic (03/19/18 2316)  Respirations: 18 (03/19/18 2316)  Height: 5' 5" (165 1 cm) (03/17/18 1100)  Weight - Scale: 64 9 kg (143 lb 1 3 oz) (03/17/18 1100)  SpO2: 100 % (03/19/18 2316)  Exam:   Physical Exam   Constitutional: She appears well-developed and well-nourished  HENT:   Head: Normocephalic and atraumatic  Eyes: Pupils are equal, round, and reactive to light  Neck:   Right neck central line without sign of infection   Cardiovascular: Regular rhythm  No murmur heard  Pulmonary/Chest: Effort normal    Abdominal: Soft  Bowel sounds are normal  She exhibits no distension  Musculoskeletal: She exhibits no edema  Neurological: She is alert  Skin: Skin is warm  Psychiatric: Her behavior is normal        Discharge instructions/Information to patient and family:   See after visit summary for information provided to patient and family  Provisions for Follow-Up Care:  See after visit summary for information related to follow-up care and any pertinent home health orders  Disposition:     Home        Planned Readmission: none     Discharge Statement:  I spent >30 minutes discharging the patient  This time was spent on the day of discharge  I had direct contact with the patient on the day of discharge  Greater than 50% of the total time was spent examining patient, answering all patient questions, arranging and discussing plan of care with patient as well as directly providing post-discharge instructions  Additional time then spent on discharge activities  Discussed with Endocrine  Coordinated with case management  Discharge Medications:  See after visit summary for reconciled discharge medications provided to patient and family  ** Please Note: Dragon 360 Dictation voice to text software may have been used in the creation of this document   **

## 2018-03-22 LAB
BACTERIA BLD CULT: NORMAL
BACTERIA BLD CULT: NORMAL